# Patient Record
Sex: FEMALE | Race: WHITE | NOT HISPANIC OR LATINO | Employment: UNEMPLOYED | URBAN - METROPOLITAN AREA
[De-identification: names, ages, dates, MRNs, and addresses within clinical notes are randomized per-mention and may not be internally consistent; named-entity substitution may affect disease eponyms.]

---

## 2017-06-23 ENCOUNTER — APPOINTMENT (OUTPATIENT)
Dept: LAB | Facility: HOSPITAL | Age: 32
End: 2017-06-23

## 2017-08-29 ENCOUNTER — TRANSCRIBE ORDERS (OUTPATIENT)
Dept: ENDOCRINOLOGY | Facility: CLINIC | Age: 32
End: 2017-08-29

## 2017-08-29 DIAGNOSIS — O24.419 GESTATIONAL DIABETES MELLITUS IN PREGNANCY, UNSPECIFIED CONTROL: Primary | ICD-10-CM

## 2017-09-08 ENCOUNTER — OFFICE VISIT (OUTPATIENT)
Dept: ENDOCRINOLOGY | Facility: CLINIC | Age: 32
End: 2017-09-08

## 2017-09-08 VITALS
BODY MASS INDEX: 23.73 KG/M2 | HEIGHT: 64 IN | DIASTOLIC BLOOD PRESSURE: 60 MMHG | OXYGEN SATURATION: 97 % | WEIGHT: 139 LBS | SYSTOLIC BLOOD PRESSURE: 112 MMHG | HEART RATE: 118 BPM

## 2017-09-08 DIAGNOSIS — R00.0 FAST HEART BEAT: ICD-10-CM

## 2017-09-08 DIAGNOSIS — O24.419 GESTATIONAL DIABETES MELLITUS (GDM), ANTEPARTUM, GESTATIONAL DIABETES METHOD OF CONTROL UNSPECIFIED: Primary | ICD-10-CM

## 2017-09-08 DIAGNOSIS — K21.9 GASTROESOPHAGEAL REFLUX DISEASE WITHOUT ESOPHAGITIS: ICD-10-CM

## 2017-09-08 PROBLEM — N20.0 CALCULUS OF KIDNEY: Status: ACTIVE | Noted: 2017-09-08

## 2017-09-08 LAB
GLUCOSE BLDC GLUCOMTR-MCNC: 92 MG/DL (ref 70–130)
HBA1C MFR BLD: 5.2 %
TSH SERPL DL<=0.005 MIU/L-ACNC: 1.5 MIU/ML (ref 0.35–5.35)

## 2017-09-08 PROCEDURE — 83036 HEMOGLOBIN GLYCOSYLATED A1C: CPT | Performed by: INTERNAL MEDICINE

## 2017-09-08 PROCEDURE — 99243 OFF/OP CNSLTJ NEW/EST LOW 30: CPT | Performed by: INTERNAL MEDICINE

## 2017-09-08 PROCEDURE — 82947 ASSAY GLUCOSE BLOOD QUANT: CPT | Performed by: INTERNAL MEDICINE

## 2017-09-08 RX ORDER — LANCETS 33 GAUGE
EACH MISCELLANEOUS
Qty: 100 EACH | Refills: 5 | Status: SHIPPED | OUTPATIENT
Start: 2017-09-08 | End: 2019-12-27

## 2017-09-08 RX ORDER — CHLORPHENIR/PHENYLEPH/ASPIRIN 2-7.8-325
1 TABLET, EFFERVESCENT ORAL DAILY
Qty: 50 STRIP | Refills: 2 | Status: SHIPPED | OUTPATIENT
Start: 2017-09-08 | End: 2017-12-05 | Stop reason: HOSPADM

## 2017-09-08 RX ORDER — BLOOD-GLUCOSE METER
EACH MISCELLANEOUS
Qty: 1 EACH | Refills: 0 | Status: SHIPPED | OUTPATIENT
Start: 2017-09-08 | End: 2017-09-15 | Stop reason: CLARIF

## 2017-09-08 RX ORDER — RANITIDINE HCL 75 MG
75 TABLET ORAL 2 TIMES DAILY
COMMUNITY
End: 2020-04-23

## 2017-09-08 NOTE — PROGRESS NOTES
Tasha Mckinney 32 y.o.  CC:Establish Care (new patient being seen at the request of Amber Coles DO for a consultation for gestational diabetes, JULY 12-, patient does have a history of GD 3 years ago , birth weight was 6 lbs, 7 oz)    [place]: Establish Care (new patient being seen at the request of Amber Coles DO for a consultation for gestational diabetes, JULY 12-, patient does have a history of GD 3 years ago , birth weight was 6 lbs, 7 oz)  we say her about 3 years ago for gestational diabetes-  Baby 6 lb 8 oz - was 39 weeks  She is currently 26 weeks  Checked pp pancakes on Saturday- 2 hour sugar 159  Blood sugars and 90 day average sugar reviewed  Results for orders placed or performed in visit on 09/08/17   POC Glycosylated Hemoglobin (Hb A1C)   Result Value Ref Range    Hemoglobin A1C 5.2 %   POC Glucose Fingerstick   Result Value Ref Range    Glucose 92 70 - 130 mg/dL       Controlled sugars with diet last pregnancy  Also very active   More morning sickness this pregnancy   We discussed the diagnosis of gestational diabetes and reviewed her glucose levels/ glucose tolerance test.  We discussed the concept of carbohydrate awaredness and carbohydrate content of meals was discussed.  Impact of sweets and other carb containing foods and types of foods typically high in carbohydrates was discussed. Overall guidelines for meal planning related to specific carbohydrate content of meals was discussed and she was provided recommendations about carbohydrates recommended with meals and with snacks.  She was asked to give up any sugared drinks, and avoid breakfast cereal.  Blood sugar testing fasting and after each meal was reviewed and the patient was taught to use a glucometer to test her blood sugar.  Normal values for blood sugar monitoring were discussed as well, with fasting level less than 95, 1 hour pp blood sugar less than 140 and 2 hour blood sugar less than 120 recommended.  Risks related to poor  control of blood sugars during pregnancy were discussed with a focus on specific risks to both her and the baby.  Risks discussed include macrosomia (large birthweight), fetal lung immaturity with respiratory distress and low oxygen level, stillbirth, low blood sugar after delivery, high bilirubin/jaundice, and hypocalcemia.  Use of medications during pregnancy (eg metformin, glyburide and insulin) was discussed.  Her long term risks (outside of pregnancy) for development of Diabetes Mellitus were reviewed and we discussed the importance of healthy lifestyle now and in the future to help reduce the risk of progression to diabetes.  She will begin testing sugars fasting and 2 hours after meals and will fax blood sugars weekly to [email or web address].  We will plan to see her back in 3-4 weeks, or sooner if problems or questions.  Thank you for this referral and please do not hesitate to call for any problems or questions.      No Known Allergies    Current Outpatient Prescriptions:   •  raNITIdine (ZANTAC) 75 MG tablet, Take 75 mg by mouth 2 (Two) Times a Day., Disp: , Rfl:   •  Acetone, Urine, Test (KETONE TEST) strip, 1 strip by In Vitro route Daily. Test urine ketones daily in the AM, Disp: 50 strip, Rfl: 2  •  Blood Glucose Monitoring Suppl (ONE TOUCH ULTRA 2) w/Device kit, Use daily to test blood sugars, Disp: 1 each, Rfl: 0  •  glucose blood (ONE TOUCH ULTRA TEST) test strip, Test blood sugars QID, Disp: 100 each, Rfl: 5  •  ONETOUCH DELICA LANCETS 33G misc, Test blood sugars QID, Disp: 100 each, Rfl: 5  Patient Active Problem List    Diagnosis   • Acid reflux [K21.9]   • Diabetes mellitus arising in pregnancy [O24.419]     Overview Note:     Impression: 09/05/2014 - doing well - blood sugars are normal with exception of 2 higher sugars pp lunch and dinner  baby moving well  she is starting to dilate - discussed postpartum   f/u 8 weeks.    commended efforts at diet/testing  Impression: 08/12/2014 -  reviewed blood sugars with her- doing well thus far.  reinforced benefits of tight sugar control during pregnancy and discussed options for higher protein intake in diet.  she will update blood sugars weekly and we will f/u 3-4 weeks;        Assessment & Plan Note:     Blood sugar and 90 day average sugar reviewed  Results for orders placed or performed in visit on 09/08/17   POC Glycosylated Hemoglobin (Hb A1C)   Result Value Ref Range    Hemoglobin A1C 5.2 %   POC Glucose Fingerstick   Result Value Ref Range    Glucose 92 70 - 130 mg/dL     Discussed starting to test sugar fasting and 2 hour pp   email sugars on Monday  Goals fasting 95 or less and pp less than 120 at 2 hours  Rationale for goals and risk of higher sugars reviewed  She will email for any problems or questions  F/u 4 weeks       • Calculus of kidney [N20.0]   • Fast heart beat [R00.0]     Review of Systems   Constitutional: Negative for activity change, appetite change, chills, diaphoresis, fatigue, fever and unexpected weight change.   HENT: Negative for congestion, dental problem, drooling, ear discharge, ear pain, facial swelling, hearing loss, mouth sores, nosebleeds, postnasal drip, rhinorrhea, sinus pressure, sneezing, sore throat, tinnitus, trouble swallowing and voice change.    Eyes: Positive for visual disturbance. Negative for photophobia, pain, discharge, redness and itching.   Respiratory: Negative for apnea, cough, choking, chest tightness, shortness of breath, wheezing and stridor.    Cardiovascular: Positive for palpitations. Negative for chest pain and leg swelling.        Leg cramps    Gastrointestinal: Positive for nausea. Negative for abdominal distention, abdominal pain, anal bleeding, blood in stool, constipation, diarrhea, rectal pain and vomiting.        GERD   Endocrine: Negative for cold intolerance, heat intolerance, polydipsia, polyphagia and polyuria.   Genitourinary: Negative for decreased urine volume, difficulty  "urinating, dysuria, enuresis, flank pain, frequency, genital sores, hematuria and urgency.   Musculoskeletal: Negative for arthralgias, back pain, gait problem, joint swelling, myalgias, neck pain and neck stiffness.   Skin: Negative for color change, pallor, rash and wound.        Bruising    Allergic/Immunologic: Negative for environmental allergies, food allergies and immunocompromised state.   Neurological: Negative for dizziness, tremors, seizures, syncope, facial asymmetry, speech difficulty, weakness, light-headedness, numbness and headaches.   Hematological: Negative for adenopathy. Does not bruise/bleed easily.   Psychiatric/Behavioral: Negative for agitation, behavioral problems, confusion, decreased concentration, dysphoric mood, hallucinations, self-injury, sleep disturbance and suicidal ideas. The patient is not nervous/anxious and is not hyperactive.      Social History     Social History   • Marital status: Unknown     Spouse name: N/A   • Number of children: N/A   • Years of education: N/A     Occupational History   • Not on file.     Social History Main Topics   • Smoking status: Never Smoker   • Smokeless tobacco: Never Used   • Alcohol use No   • Drug use: No   • Sexual activity: Defer     Other Topics Concern   • Not on file     Social History Narrative   • No narrative on file     Family History   Problem Relation Age of Onset   • Diabetes Mother    • Hypertension Mother    • Heart attack Father    • Diabetes Father    • Coronary artery disease Father    • Hyperlipidemia Father      /60  Pulse 118  Ht 64\" (162.6 cm)  Wt 139 lb (63 kg)  LMP 02/25/2017  SpO2 97%  BMI 23.86 kg/m2  Physical Exam   Constitutional: She is oriented to person, place, and time. She appears well-developed and well-nourished.   HENT:   Head: Normocephalic and atraumatic.   Nose: Nose normal.   Mouth/Throat: Oropharynx is clear and moist.   Eyes: Conjunctivae, EOM and lids are normal. Pupils are equal, round, " and reactive to light.   Neck: Trachea normal and normal range of motion. Neck supple. Carotid bruit is not present. No tracheal deviation present. No thyroid mass and no thyromegaly present.   Cardiovascular: Normal rate, regular rhythm, normal heart sounds and intact distal pulses.  Exam reveals no gallop and no friction rub.    No murmur heard.  Pulmonary/Chest: Effort normal and breath sounds normal. No respiratory distress. She has no wheezes.   Musculoskeletal: Normal range of motion. She exhibits no edema or deformity.   Lymphadenopathy:     She has no cervical adenopathy.   Neurological: She is alert and oriented to person, place, and time. She has normal reflexes. She displays normal reflexes. No cranial nerve deficit.   Skin: Skin is warm and dry. No rash noted. No cyanosis or erythema. Nails show no clubbing.   Psychiatric: She has a normal mood and affect. Her speech is normal and behavior is normal. Judgment and thought content normal. Cognition and memory are normal.   Nursing note and vitals reviewed.    Results for orders placed or performed in visit on 09/08/17   POC Glycosylated Hemoglobin (Hb A1C)   Result Value Ref Range    Hemoglobin A1C 5.2 %   POC Glucose Fingerstick   Result Value Ref Range    Glucose 92 70 - 130 mg/dL     Problem List Items Addressed This Visit        Endocrine    Diabetes mellitus arising in pregnancy - Primary     Blood sugar and 90 day average sugar reviewed  Results for orders placed or performed in visit on 09/08/17   POC Glycosylated Hemoglobin (Hb A1C)   Result Value Ref Range    Hemoglobin A1C 5.2 %   POC Glucose Fingerstick   Result Value Ref Range    Glucose 92 70 - 130 mg/dL     Discussed starting to test sugar fasting and 2 hour pp   email sugars on Monday  Goals fasting 95 or less and pp less than 120 at 2 hours  Rationale for goals and risk of higher sugars reviewed  She will email for any problems or questions  F/u 4 weeks           Relevant Orders    POC  Glycosylated Hemoglobin (Hb A1C) (Completed)    POC Glucose Fingerstick (Completed)    TSH        Return in about 4 weeks (around 10/6/2017) for Recheck 30 min .    Gia Hidalgo MD  Signed Gia Hidalgo MD

## 2017-09-08 NOTE — ASSESSMENT & PLAN NOTE
Blood sugar and 90 day average sugar reviewed  Results for orders placed or performed in visit on 09/08/17   POC Glycosylated Hemoglobin (Hb A1C)   Result Value Ref Range    Hemoglobin A1C 5.2 %   POC Glucose Fingerstick   Result Value Ref Range    Glucose 92 70 - 130 mg/dL     Discussed starting to test sugar fasting and 2 hour pp   email sugars on Monday  Goals fasting 95 or less and pp less than 120 at 2 hours  Rationale for goals and risk of higher sugars reviewed  She will email for any problems or questions  F/u 4 weeks

## 2017-09-15 RX ORDER — GLUCOSAMINE HCL/CHONDROITIN SU 500-400 MG
CAPSULE ORAL
Qty: 100 EACH | Refills: 5 | Status: SHIPPED | OUTPATIENT
Start: 2017-09-15 | End: 2017-12-05 | Stop reason: HOSPADM

## 2017-09-15 RX ORDER — PEN NEEDLE, DIABETIC 31 GX5/16"
NEEDLE, DISPOSABLE MISCELLANEOUS
Qty: 100 EACH | Refills: 5 | Status: SHIPPED | OUTPATIENT
Start: 2017-09-15 | End: 2019-12-27

## 2017-09-15 RX ORDER — DIPHENHYDRAMINE HYDROCHLORIDE 25 MG/1
1 CAPSULE, LIQUID FILLED ORAL DAILY
Qty: 1 EACH | Refills: 0 | Status: SHIPPED | OUTPATIENT
Start: 2017-09-15 | End: 2019-12-27

## 2017-09-15 NOTE — TELEPHONE ENCOUNTER
rx was sent for glucose meter, strips and lancets to replace one touch meter (due to insurance coverage)

## 2017-10-02 ENCOUNTER — OFFICE VISIT (OUTPATIENT)
Dept: ENDOCRINOLOGY | Facility: CLINIC | Age: 32
End: 2017-10-02

## 2017-10-02 VITALS
SYSTOLIC BLOOD PRESSURE: 112 MMHG | HEIGHT: 65 IN | DIASTOLIC BLOOD PRESSURE: 50 MMHG | OXYGEN SATURATION: 98 % | WEIGHT: 143 LBS | BODY MASS INDEX: 23.82 KG/M2 | HEART RATE: 93 BPM

## 2017-10-02 DIAGNOSIS — O24.410 DIET CONTROLLED GESTATIONAL DIABETES MELLITUS (GDM) IN SECOND TRIMESTER: Primary | ICD-10-CM

## 2017-10-02 PROCEDURE — 99213 OFFICE O/P EST LOW 20 MIN: CPT | Performed by: INTERNAL MEDICINE

## 2017-10-02 RX ORDER — LANCETS 33 GAUGE
EACH MISCELLANEOUS
COMMUNITY
Start: 2014-08-12 | End: 2019-12-27

## 2017-10-02 RX ORDER — .BETA.-CAROTENE, ASCORBIC ACID, CHOLECALCIFEROL, .ALPHA.-TOCOPHEROL ACETATE, DL-, THIAMINE MONONITRATE, RIBOFLAVIN, NIACINAMIDE, PYRIDOXINE HYDROCHLORIDE, FOLIC ACID, CYANOCOBALAMIN, CALCIUM PANTOTHENATE, CALCIUM CARBONATE, FERROUS FUMARATE, ZINC OXIDE, AND DOCUSATE SODIUM 1000; 100; 400; 30; 3; 3; 15; 20; 1; 12; 7; 200; 29; 20; 25 [IU]/1; MG/1; [IU]/1; [IU]/1; MG/1; MG/1; MG/1; MG/1; MG/1; UG/1; MG/1; MG/1; MG/1; MG/1; MG/1
TABLET, COATED ORAL DAILY
COMMUNITY
Start: 2014-08-04

## 2017-10-02 NOTE — ASSESSMENT & PLAN NOTE
Higher blood sugars (see above)   She can modify diet - has been eating more carbs than allotted   Continue eating a healthy diet, monitor sugars and email this Sunday  Most higher sugars are marginal, I think it is fine for her to work a little harder on diet  F/u 3-4 weeks

## 2017-10-02 NOTE — PROGRESS NOTES
Tasha Mckinney 32 y.o.  CC:Follow-up and Gestational Diabetes (JULY 12-17-17, OB:  Amber Coles DO)    [place]: Follow-up and Gestational Diabetes (JULY 12-17-17, OB:  Amber Coles DO)    Blood sugars fasting 84-94  Pp breakfast 104-114  Pp lunch 108-136 with 3 sugars over goal  Pp dinner 102-128 with 2 sugars over goal  Higher sugars are after visiting friend and eating cake  Discussed acog criteria for medication when sugar levels are over goal more than 2 x a week  She does not want to start medication - states she can do better with diet- is eating a lot of sweets  Reviewed goal for no more than 2 sugars over goals per week.  Baby moving well    No Known Allergies    Current Outpatient Prescriptions:   •  Acetone, Urine, Test (KETONE TEST) strip, 1 strip by In Vitro route Daily. Test urine ketones daily in the AM, Disp: 50 strip, Rfl: 2  •  Blood Glucose Monitoring Suppl (BLOOD GLUCOSE MONITOR SYSTEM) w/Device kit, 1 Device Daily., Disp: 1 each, Rfl: 0  •  Glucose Blood (BLOOD GLUCOSE TEST) strip, Test blood sugar QID, Disp: 100 each, Rfl: 5  •  LANCETS ULTRA THIN misc, Test blood sugars QID, Disp: 100 each, Rfl: 5  •  ONETOUCH DELICA LANCETS 33G misc, Test blood sugars QID, Disp: 100 each, Rfl: 5  •  raNITIdine (ZANTAC) 75 MG tablet, Take 75 mg by mouth 2 (Two) Times a Day., Disp: , Rfl:   Patient Active Problem List    Diagnosis   • Acid reflux [K21.9]   • Diabetes mellitus arising in pregnancy [O24.419]     Overview Note:     Impression: 09/05/2014 - doing well - blood sugars are normal with exception of 2 higher sugars pp lunch and dinner  baby moving well  she is starting to dilate - discussed postpartum   f/u 8 weeks.    commended efforts at diet/testing  Impression: 08/12/2014 - reviewed blood sugars with her- doing well thus far.  reinforced benefits of tight sugar control during pregnancy and discussed options for higher protein intake in diet.  she will update blood sugars weekly and we will f/u 3-4 weeks;       • Calculus of kidney [N20.0]   • Fast heart beat [R00.0]     Review of Systems   Constitutional: Negative for activity change, appetite change, chills, diaphoresis, fatigue, fever and unexpected weight change.   HENT: Negative for congestion, dental problem, drooling, ear discharge, ear pain, facial swelling, hearing loss, mouth sores, nosebleeds, postnasal drip, rhinorrhea, sinus pressure, sneezing, sore throat, tinnitus, trouble swallowing and voice change.    Eyes: Negative for photophobia, pain, discharge, redness, itching and visual disturbance.   Respiratory: Negative for apnea, cough, choking, chest tightness, shortness of breath, wheezing and stridor.    Cardiovascular: Negative for chest pain, palpitations and leg swelling.   Gastrointestinal: Negative for abdominal distention, abdominal pain, anal bleeding, blood in stool, constipation, diarrhea, nausea, rectal pain and vomiting.   Endocrine: Negative for cold intolerance, heat intolerance, polydipsia, polyphagia and polyuria.   Genitourinary: Negative for decreased urine volume, difficulty urinating, dysuria, enuresis, flank pain, frequency, genital sores, hematuria and urgency.   Musculoskeletal: Negative for arthralgias, back pain, gait problem, joint swelling, myalgias, neck pain and neck stiffness.   Skin: Negative for color change, pallor, rash and wound.   Allergic/Immunologic: Negative for environmental allergies, food allergies and immunocompromised state.   Neurological: Negative for dizziness, tremors, seizures, syncope, facial asymmetry, speech difficulty, weakness, light-headedness, numbness and headaches.   Hematological: Negative for adenopathy. Does not bruise/bleed easily.   Psychiatric/Behavioral: Negative for agitation, behavioral problems, confusion, decreased concentration, dysphoric mood, hallucinations, self-injury, sleep disturbance and suicidal ideas. The patient is not nervous/anxious and is not hyperactive.      Social  "History     Social History   • Marital status: Unknown     Spouse name: N/A   • Number of children: N/A   • Years of education: N/A     Occupational History   • Not on file.     Social History Main Topics   • Smoking status: Never Smoker   • Smokeless tobacco: Never Used   • Alcohol use No   • Drug use: No   • Sexual activity: Defer     Other Topics Concern   • Not on file     Social History Narrative     Family History   Problem Relation Age of Onset   • Diabetes Mother    • Hypertension Mother    • Heart attack Father    • Diabetes Father    • Coronary artery disease Father    • Hyperlipidemia Father      /50  Pulse 93  Ht 65\" (165.1 cm)  Wt 143 lb (64.9 kg)  LMP 02/25/2017  SpO2 98%  BMI 23.8 kg/m2  Physical Exam   Constitutional: She is oriented to person, place, and time. She appears well-developed and well-nourished.   HENT:   Head: Normocephalic and atraumatic.   Nose: Nose normal.   Mouth/Throat: Oropharynx is clear and moist.   Eyes: Conjunctivae, EOM and lids are normal. Pupils are equal, round, and reactive to light.   Neck: Trachea normal and normal range of motion. Neck supple. Carotid bruit is not present. No tracheal deviation present. No thyroid mass and no thyromegaly present.   Cardiovascular: Normal rate, regular rhythm, normal heart sounds and intact distal pulses.  Exam reveals no gallop and no friction rub.    No murmur heard.  Pulmonary/Chest: Effort normal and breath sounds normal. No respiratory distress. She has no wheezes.   Musculoskeletal: Normal range of motion. She exhibits no edema or deformity.   Lymphadenopathy:     She has no cervical adenopathy.   Neurological: She is alert and oriented to person, place, and time. She has normal reflexes. She displays normal reflexes. No cranial nerve deficit.   Skin: Skin is warm and dry. No rash noted. No cyanosis or erythema. Nails show no clubbing.   Psychiatric: She has a normal mood and affect. Her speech is normal and behavior " is normal. Judgment and thought content normal. Cognition and memory are normal.   Nursing note and vitals reviewed.    Results for orders placed or performed in visit on 09/08/17   TSH   Result Value Ref Range    TSH 1.497 0.350 - 5.350 mIU/mL   POC Glycosylated Hemoglobin (Hb A1C)   Result Value Ref Range    Hemoglobin A1C 5.2 %   POC Glucose Fingerstick   Result Value Ref Range    Glucose 92 70 - 130 mg/dL     Problem List Items Addressed This Visit        Endocrine    Diabetes mellitus arising in pregnancy - Primary     Higher blood sugars (see above)   She can modify diet - has been eating more carbs than allotted   Continue eating a healthy diet, monitor sugars and email this Sunday  Most higher sugars are marginal, I think it is fine for her to work a little harder on diet  F/u 3-4 weeks              Return in about 4 weeks (around 10/30/2017) for Recheck 30 min .      Selene Howard MA  Signed Gia Hidalgo MD

## 2017-11-02 ENCOUNTER — OFFICE VISIT (OUTPATIENT)
Dept: ENDOCRINOLOGY | Facility: CLINIC | Age: 32
End: 2017-11-02

## 2017-11-02 VITALS
SYSTOLIC BLOOD PRESSURE: 122 MMHG | OXYGEN SATURATION: 100 % | HEIGHT: 64 IN | DIASTOLIC BLOOD PRESSURE: 70 MMHG | HEART RATE: 119 BPM | WEIGHT: 147 LBS | BODY MASS INDEX: 25.1 KG/M2

## 2017-11-02 DIAGNOSIS — O24.410 DIET CONTROLLED GESTATIONAL DIABETES MELLITUS (GDM) IN THIRD TRIMESTER: Primary | ICD-10-CM

## 2017-11-02 PROCEDURE — 99213 OFFICE O/P EST LOW 20 MIN: CPT | Performed by: INTERNAL MEDICINE

## 2017-11-02 NOTE — PROGRESS NOTES
Tasha Hank 32 y.o.  CC:Follow-up and Gestational Diabetes (JULY 12- OB: Amber Coles DO)    [place]: Follow-up and Gestational Diabetes (JULY 12- OB: Amber Coles DO)  fasting sugar is 83-92  Pp breakfast   Pp lunch  with 1 sugar over goal  Pp dinner  with no sugar over goal (129 was 1 hour testing)  Blood sugars are still normal this week  Sugars are lower  Is doing well with snack at bedtime   She is 33 weeks  Baby moving well   No swelling   bp is good  A lot of nausea in the morning     Allergies   Allergen Reactions   • No Known Drug Allergy        Current Outpatient Prescriptions:   •  Acetone, Urine, Test (KETONE TEST) strip, 1 strip by In Vitro route Daily. Test urine ketones daily in the AM, Disp: 50 strip, Rfl: 2  •  acetone, urine, test (KETOSTIX) strip, Ketostix In Vitro Strip; Patient Sig: Ketostix In Vitro Strip Check urine q am; 1; 2; 04-Aug-2014; Active, Disp: , Rfl:   •  Blood Glucose Monitoring Suppl (BLOOD GLUCOSE MONITOR SYSTEM) w/Device kit, 1 Device Daily., Disp: 1 each, Rfl: 0  •  Glucose Blood (BLOOD GLUCOSE TEST) strip, Test blood sugar QID, Disp: 100 each, Rfl: 5  •  LANCETS ULTRA THIN misc, Test blood sugars QID, Disp: 100 each, Rfl: 5  •  ONETOUCH DELICA LANCETS 33G misc, Test blood sugars QID, Disp: 100 each, Rfl: 5  •  ONETOUCH DELICA LANCETS 33G misc, , Disp: , Rfl:   •  Prenatal Vit-DSS-Fe Fum-FA (PRENATAL 19) 29-1 MG tablet, Take  by mouth Daily., Disp: , Rfl:   •  raNITIdine (ZANTAC) 75 MG tablet, Take 75 mg by mouth 2 (Two) Times a Day., Disp: , Rfl:   Patient Active Problem List    Diagnosis   • Acid reflux [K21.9]   • Diabetes mellitus arising in pregnancy [O24.419]     Overview Note:     Impression: 09/05/2014 - doing well - blood sugars are normal with exception of 2 higher sugars pp lunch and dinner  baby moving well  she is starting to dilate - discussed postpartum   f/u 8 weeks.    commended efforts at diet/testing  Impression: 08/12/2014 -  reviewed blood sugars with her- doing well thus far.  reinforced benefits of tight sugar control during pregnancy and discussed options for higher protein intake in diet.  she will update blood sugars weekly and we will f/u 3-4 weeks;      • Calculus of kidney [N20.0]   • Fast heart beat [R00.0]     Review of Systems   Constitutional: Negative for activity change, appetite change, chills, diaphoresis, fatigue, fever and unexpected weight change.   HENT: Negative for congestion, dental problem, drooling, ear discharge, ear pain, facial swelling, hearing loss, mouth sores, nosebleeds, postnasal drip, rhinorrhea, sinus pressure, sneezing, sore throat, tinnitus, trouble swallowing and voice change.    Eyes: Negative for photophobia, pain, discharge, redness, itching and visual disturbance.   Respiratory: Negative for apnea, cough, choking, chest tightness, shortness of breath, wheezing and stridor.    Cardiovascular: Negative for chest pain, palpitations and leg swelling.   Gastrointestinal: Negative for abdominal distention, abdominal pain, anal bleeding, blood in stool, constipation, diarrhea, nausea, rectal pain and vomiting.   Endocrine: Negative for cold intolerance, heat intolerance, polydipsia, polyphagia and polyuria.   Genitourinary: Negative for decreased urine volume, difficulty urinating, dysuria, enuresis, flank pain, frequency, genital sores, hematuria and urgency.   Musculoskeletal: Negative for arthralgias, back pain, gait problem, joint swelling, myalgias, neck pain and neck stiffness.   Skin: Negative for color change, pallor, rash and wound.   Allergic/Immunologic: Negative for environmental allergies, food allergies and immunocompromised state.   Neurological: Negative for dizziness, tremors, seizures, syncope, facial asymmetry, speech difficulty, weakness, light-headedness, numbness and headaches.   Hematological: Negative for adenopathy. Does not bruise/bleed easily.   Psychiatric/Behavioral:  "Negative for agitation, behavioral problems, confusion, decreased concentration, dysphoric mood, hallucinations, self-injury, sleep disturbance and suicidal ideas. The patient is not nervous/anxious and is not hyperactive.      Social History     Social History   • Marital status: Unknown     Spouse name: N/A   • Number of children: N/A   • Years of education: N/A     Occupational History   • Not on file.     Social History Main Topics   • Smoking status: Never Smoker   • Smokeless tobacco: Never Used   • Alcohol use No   • Drug use: No   • Sexual activity: Defer     Other Topics Concern   • Not on file     Social History Narrative     Family History   Problem Relation Age of Onset   • Diabetes Mother    • Hypertension Mother    • Heart attack Father    • Diabetes Father    • Coronary artery disease Father    • Hyperlipidemia Father      /70  Pulse 119  Ht 64\" (162.6 cm)  Wt 147 lb (66.7 kg)  LMP 02/25/2017  SpO2 100%  BMI 25.23 kg/m2  Physical Exam   Constitutional: She is oriented to person, place, and time. She appears well-developed and well-nourished.   HENT:   Head: Normocephalic and atraumatic.   Nose: Nose normal.   Mouth/Throat: Oropharynx is clear and moist.   Eyes: Conjunctivae, EOM and lids are normal. Pupils are equal, round, and reactive to light.   Neck: Trachea normal and normal range of motion. Neck supple. Carotid bruit is not present. No tracheal deviation present. No thyroid mass and no thyromegaly present.   Cardiovascular: Normal rate, regular rhythm, normal heart sounds and intact distal pulses.  Exam reveals no gallop and no friction rub.    No murmur heard.  Pulmonary/Chest: Effort normal and breath sounds normal. No respiratory distress. She has no wheezes.   Musculoskeletal: Normal range of motion. She exhibits no edema or deformity.   Lymphadenopathy:     She has no cervical adenopathy.   Neurological: She is alert and oriented to person, place, and time. She has normal " reflexes. She displays normal reflexes. No cranial nerve deficit.   Skin: Skin is warm and dry. No rash noted. No cyanosis or erythema. Nails show no clubbing.   Psychiatric: She has a normal mood and affect. Her speech is normal and behavior is normal. Judgment and thought content normal. Cognition and memory are normal.   Nursing note and vitals reviewed.    Results for orders placed or performed in visit on 09/08/17   TSH   Result Value Ref Range    TSH 1.497 0.350 - 5.350 mIU/mL   POC Glycosylated Hemoglobin (Hb A1C)   Result Value Ref Range    Hemoglobin A1C 5.2 %   POC Glucose Fingerstick   Result Value Ref Range    Glucose 92 70 - 130 mg/dL     Problem List Items Addressed This Visit        Endocrine    Diabetes mellitus arising in pregnancy - Primary     Reviewed sugars last week and this week and they are normal  No change for now - f/u pp in 12-14 weeks            Return in about 14 weeks (around 2/8/2018) for Recheck 30 min .    Selene Howard MA  Signed Gia Hidalgo MD

## 2017-11-02 NOTE — ASSESSMENT & PLAN NOTE
Reviewed sugars last week and this week and they are normal  No change for now - f/u pp in 12-14 weeks

## 2017-11-28 ENCOUNTER — LAB REQUISITION (OUTPATIENT)
Dept: LAB | Facility: HOSPITAL | Age: 32
End: 2017-11-28

## 2017-11-28 DIAGNOSIS — Z00.00 ROUTINE GENERAL MEDICAL EXAMINATION AT A HEALTH CARE FACILITY: ICD-10-CM

## 2017-11-28 PROCEDURE — 36415 COLL VENOUS BLD VENIPUNCTURE: CPT | Performed by: OBSTETRICS & GYNECOLOGY

## 2017-12-03 ENCOUNTER — HOSPITAL ENCOUNTER (INPATIENT)
Facility: HOSPITAL | Age: 32
LOS: 2 days | Discharge: HOME | End: 2017-12-05
Attending: OBSTETRICS & GYNECOLOGY | Admitting: OBSTETRICS & GYNECOLOGY

## 2017-12-03 PROBLEM — Z37.9 NORMAL LABOR: Status: RESOLVED | Noted: 2017-12-03 | Resolved: 2017-12-03

## 2017-12-03 PROBLEM — Z3A.38 PREGNANCY WITH 38 COMPLETED WEEKS GESTATION: Status: RESOLVED | Noted: 2017-12-03 | Resolved: 2017-12-03

## 2017-12-03 PROBLEM — Z3A.38 PREGNANCY WITH 38 COMPLETED WEEKS GESTATION: Status: ACTIVE | Noted: 2017-12-03

## 2017-12-03 PROBLEM — Z37.9 NORMAL LABOR: Status: ACTIVE | Noted: 2017-12-03

## 2017-12-03 LAB
ABO GROUP BLD: NORMAL
ALP SERPL-CCNC: 173 U/L (ref 25–100)
ALT SERPL W P-5'-P-CCNC: 18 U/L (ref 7–40)
AST SERPL-CCNC: 22 U/L (ref 0–33)
BILIRUB SERPL-MCNC: 0.4 MG/DL (ref 0.3–1.2)
BLD GP AB SCN SERPL QL: NEGATIVE
CREAT BLD-MCNC: 0.5 MG/DL (ref 0.6–1.3)
DEPRECATED RDW RBC AUTO: 47.5 FL (ref 37–54)
ERYTHROCYTE [DISTWIDTH] IN BLOOD BY AUTOMATED COUNT: 16.8 % (ref 11.3–14.5)
GLUCOSE BLDC GLUCOMTR-MCNC: 100 MG/DL (ref 70–130)
HCT VFR BLD AUTO: 30.2 % (ref 34.5–44)
HGB BLD-MCNC: 9.2 G/DL (ref 11.5–15.5)
LDH SERPL-CCNC: 198 U/L (ref 120–246)
MCH RBC QN AUTO: 23.9 PG (ref 27–31)
MCHC RBC AUTO-ENTMCNC: 30.5 G/DL (ref 32–36)
MCV RBC AUTO: 78.4 FL (ref 80–99)
PLATELET # BLD AUTO: 161 10*3/MM3 (ref 150–450)
PMV BLD AUTO: 9.1 FL (ref 6–12)
RBC # BLD AUTO: 3.85 10*6/MM3 (ref 3.89–5.14)
RH BLD: POSITIVE
URATE SERPL-MCNC: 5.6 MG/DL (ref 3.1–7.8)
WBC NRBC COR # BLD: 11.11 10*3/MM3 (ref 3.5–10.8)

## 2017-12-03 PROCEDURE — 86900 BLOOD TYPING SEROLOGIC ABO: CPT | Performed by: ADVANCED PRACTICE MIDWIFE

## 2017-12-03 PROCEDURE — 86850 RBC ANTIBODY SCREEN: CPT | Performed by: ADVANCED PRACTICE MIDWIFE

## 2017-12-03 PROCEDURE — 84550 ASSAY OF BLOOD/URIC ACID: CPT | Performed by: ADVANCED PRACTICE MIDWIFE

## 2017-12-03 PROCEDURE — 83615 LACTATE (LD) (LDH) ENZYME: CPT | Performed by: ADVANCED PRACTICE MIDWIFE

## 2017-12-03 PROCEDURE — 85027 COMPLETE CBC AUTOMATED: CPT | Performed by: ADVANCED PRACTICE MIDWIFE

## 2017-12-03 PROCEDURE — 86901 BLOOD TYPING SEROLOGIC RH(D): CPT | Performed by: ADVANCED PRACTICE MIDWIFE

## 2017-12-03 PROCEDURE — 84450 TRANSFERASE (AST) (SGOT): CPT | Performed by: ADVANCED PRACTICE MIDWIFE

## 2017-12-03 PROCEDURE — 25010000002 BUTORPHANOL PER 1 MG: Performed by: ADVANCED PRACTICE MIDWIFE

## 2017-12-03 PROCEDURE — 84075 ASSAY ALKALINE PHOSPHATASE: CPT | Performed by: ADVANCED PRACTICE MIDWIFE

## 2017-12-03 PROCEDURE — 84460 ALANINE AMINO (ALT) (SGPT): CPT | Performed by: ADVANCED PRACTICE MIDWIFE

## 2017-12-03 PROCEDURE — 25010000002 PENICILLIN G POTASSIUM PER 600000 UNITS: Performed by: ADVANCED PRACTICE MIDWIFE

## 2017-12-03 PROCEDURE — 82962 GLUCOSE BLOOD TEST: CPT

## 2017-12-03 PROCEDURE — 82247 BILIRUBIN TOTAL: CPT | Performed by: ADVANCED PRACTICE MIDWIFE

## 2017-12-03 PROCEDURE — 59025 FETAL NON-STRESS TEST: CPT

## 2017-12-03 PROCEDURE — 82565 ASSAY OF CREATININE: CPT | Performed by: ADVANCED PRACTICE MIDWIFE

## 2017-12-03 PROCEDURE — 0KQM0ZZ REPAIR PERINEUM MUSCLE, OPEN APPROACH: ICD-10-PCS | Performed by: ADVANCED PRACTICE MIDWIFE

## 2017-12-03 RX ORDER — OXYTOCIN/RINGER'S LACTATE 20/1000 ML
125 PLASTIC BAG, INJECTION (ML) INTRAVENOUS CONTINUOUS PRN
Status: DISPENSED | OUTPATIENT
Start: 2017-12-03 | End: 2017-12-04

## 2017-12-03 RX ORDER — MISOPROSTOL 200 UG/1
800 TABLET ORAL AS NEEDED
Status: DISCONTINUED | OUTPATIENT
Start: 2017-12-03 | End: 2017-12-05 | Stop reason: HOSPADM

## 2017-12-03 RX ORDER — LIDOCAINE HYDROCHLORIDE 10 MG/ML
5 INJECTION, SOLUTION INFILTRATION; PERINEURAL AS NEEDED
Status: DISCONTINUED | OUTPATIENT
Start: 2017-12-03 | End: 2017-12-03 | Stop reason: HOSPADM

## 2017-12-03 RX ORDER — DOCUSATE SODIUM 100 MG/1
100 CAPSULE, LIQUID FILLED ORAL 2 TIMES DAILY
Status: DISCONTINUED | OUTPATIENT
Start: 2017-12-04 | End: 2017-12-05 | Stop reason: HOSPADM

## 2017-12-03 RX ORDER — METHYLERGONOVINE MALEATE 0.2 MG/ML
200 INJECTION INTRAVENOUS ONCE AS NEEDED
Status: DISCONTINUED | OUTPATIENT
Start: 2017-12-03 | End: 2017-12-05 | Stop reason: HOSPADM

## 2017-12-03 RX ORDER — IBUPROFEN 600 MG/1
600 TABLET ORAL EVERY 6 HOURS PRN
Status: DISCONTINUED | OUTPATIENT
Start: 2017-12-03 | End: 2017-12-05 | Stop reason: HOSPADM

## 2017-12-03 RX ORDER — SODIUM CHLORIDE 0.9 % (FLUSH) 0.9 %
1-10 SYRINGE (ML) INJECTION AS NEEDED
Status: DISCONTINUED | OUTPATIENT
Start: 2017-12-03 | End: 2017-12-03 | Stop reason: HOSPADM

## 2017-12-03 RX ORDER — OXYTOCIN/RINGER'S LACTATE 20/1000 ML
999 PLASTIC BAG, INJECTION (ML) INTRAVENOUS ONCE
Status: DISCONTINUED | OUTPATIENT
Start: 2017-12-03 | End: 2017-12-05 | Stop reason: HOSPADM

## 2017-12-03 RX ORDER — MAGNESIUM CARB/ALUMINUM HYDROX 105-160MG
30 TABLET,CHEWABLE ORAL ONCE
Status: DISCONTINUED | OUTPATIENT
Start: 2017-12-03 | End: 2017-12-03 | Stop reason: HOSPADM

## 2017-12-03 RX ORDER — PRENATAL VIT/IRON FUM/FOLIC AC 27MG-0.8MG
1 TABLET ORAL DAILY
Status: DISCONTINUED | OUTPATIENT
Start: 2017-12-04 | End: 2017-12-05 | Stop reason: HOSPADM

## 2017-12-03 RX ORDER — ACETAMINOPHEN 325 MG/1
650 TABLET ORAL EVERY 4 HOURS PRN
Status: DISCONTINUED | OUTPATIENT
Start: 2017-12-03 | End: 2017-12-05 | Stop reason: HOSPADM

## 2017-12-03 RX ORDER — FERROUS SULFATE 325(65) MG
325 TABLET ORAL 2 TIMES DAILY WITH MEALS
Status: DISCONTINUED | OUTPATIENT
Start: 2017-12-03 | End: 2017-12-05 | Stop reason: HOSPADM

## 2017-12-03 RX ORDER — SODIUM CHLORIDE, SODIUM LACTATE, POTASSIUM CHLORIDE, CALCIUM CHLORIDE 600; 310; 30; 20 MG/100ML; MG/100ML; MG/100ML; MG/100ML
125 INJECTION, SOLUTION INTRAVENOUS CONTINUOUS
Status: DISCONTINUED | OUTPATIENT
Start: 2017-12-03 | End: 2017-12-03

## 2017-12-03 RX ORDER — CARBOPROST TROMETHAMINE 250 UG/ML
250 INJECTION, SOLUTION INTRAMUSCULAR AS NEEDED
Status: DISCONTINUED | OUTPATIENT
Start: 2017-12-03 | End: 2017-12-05 | Stop reason: HOSPADM

## 2017-12-03 RX ADMIN — ACETAMINOPHEN 650 MG: 325 TABLET, FILM COATED ORAL at 22:38

## 2017-12-03 RX ADMIN — SODIUM CHLORIDE, POTASSIUM CHLORIDE, SODIUM LACTATE AND CALCIUM CHLORIDE 125 ML/HR: 600; 310; 30; 20 INJECTION, SOLUTION INTRAVENOUS at 18:25

## 2017-12-03 RX ADMIN — BENZOCAINE AND MENTHOL: 20; .5 SPRAY TOPICAL at 22:37

## 2017-12-03 RX ADMIN — BUTORPHANOL TARTRATE 1 MG: 2 INJECTION, SOLUTION INTRAMUSCULAR; INTRAVENOUS at 18:42

## 2017-12-03 RX ADMIN — IBUPROFEN 600 MG: 600 TABLET, FILM COATED ORAL at 22:38

## 2017-12-03 RX ADMIN — BENZOCAINE 1 APPLICATION: 5.6 OINTMENT TOPICAL at 22:37

## 2017-12-03 RX ADMIN — SODIUM CHLORIDE 15 MILLION UNITS: 9 INJECTION, SOLUTION INTRAVENOUS at 18:17

## 2017-12-03 NOTE — NURSING NOTE
CHRIS ANDERSM called on her cell phone informed of pt's arrival  , informed of gbs +,  Pt reports 3rd baby, srom 1 hour ago, and hx of fast labor,  Pt wanting to go natural.  Shyann will put orders in .

## 2017-12-03 NOTE — H&P
[facility]  Obstetric History and Physical    Chief Complaint   Patient presents with   • Laboring     srom       Subjective     Patient is a 32 y.o. female  currently at 38w0d, who presents with report of SROM around 1700, clear fluid. Strong regular contractions.  Baby active.  Starting to feel pelvic pressure with contractions.    Her prenatal care is significant for A1 DM.  Her previous obstetric/gynecological history  is remarkable for  tachycardia and gestational diabetes in each pregnancy .    The following portions of the patients history were reviewed and updated as appropriate: current medications, allergies, past medical history, past surgical history, past family history and past social history .       Prenatal Information:   Maternal Prenatal Labs  Blood Type No results found for: ABO   Rh Status No results found for: RH   Antibody Screen No results found for: ABSCRN   Gonnorhea No results found for: GCCX   Chlamydia No results found for: CLAMYDCU   RPR No results found for: RPR   Syphilis Antibody No results found for: SYPHILIS   Rubella No results found for: RUBELLAIGGIN   Hepatitis B Surface Antigen No results found for: HEPBSAG   HIV-1 Antibody No results found for: LABHIV1   Hepatitis C Antibody No results found for: HEPCAB   Rapid Urin Drug Screen No results found for: AMPMETHU, BARBITSCNUR, LABBENZSCN, LABMETHSCN, LABOPIASCN, THCURSCR, COCAINEUR, AMPHETSCREEN, PROPOXSCN, BUPRENORSCNU, METAMPSCNUR, OXYCODONESCN, TRICYCLICSCN   Group B Strep Culture No results found for: GBSANTIGEN                 Past OB History:       Obstetric History       T2      L2     SAB0   TAB0   Ectopic0   Multiple0   Live Births2       # Outcome Date GA Lbr [place]/2nd Weight Sex Delivery Anes PTL Lv   5 Current            4 SAB //16 6w0d          3 Term 14 39w0d  6 lb 14 oz (3.118 kg) M Vag-Spont None N LISA   2 SAB 13 6w0d          1 Term 12/13/10 38w0d  6 lb 13 oz (3.09 kg) M  Vag-Spont None N LISA          Past Medical History: Past Medical History:   Diagnosis Date   • Anxiety    • Gestational diabetes    • Normal vaginal delivery 2014   • Tachycardia    • Tachycardia     during all pregnancy's see's cardiology    • Varicose veins of left leg with edema       Past Surgical History No past surgical history on file.   Family History: Family History   Problem Relation Age of Onset   • Diabetes Mother    • Hypertension Mother    • Heart attack Father    • Diabetes Father    • Coronary artery disease Father    • Hyperlipidemia Father       Social History:  reports that she has never smoked. She has never used smokeless tobacco.   reports that she does not drink alcohol.   reports that she does not use illicit drugs.        General ROS: Pertinent items are noted in HPI, all other systems reviewed and negative    Objective     Vitals:    12/03/17 1849   BP: 137/92   Pulse: 107   Resp: 20   Temp:      Weight: Weight:  [152 lb (68.9 kg)] 152 lb (68.9 kg)     Physical Examination:   General Appearance: alert, well appearing, in intermittent distress secondary to contractions  Lungs: clear to auscultation, no wheezes, rales or rhonchi, symmetric air entry  Heart: regular rate and rhythm, murmur soft systolic  Abdomen: fundus soft, nontender between contractions 38  weeks size and FHT present  Back exam: no CVA tenderness   Extremities: no redness or tenderness in the calves or thighs, no edema  Skin: normal coloration and turgor, no rashes      Presentation: Vertex   Cervix: Exam by: Method: sterile exam per RN   Dilation: Dilation: 7   Effacement: Cervical Effacement: 90%   Station: Station: -1        Fetal Heart Rate Assessment   Method: Fetal HR Assessment Method: external   Beats/min: Fetal HR (Beats/Min): 150   Baseline: Fetal HR Baseline: normal range (110-160 bpm)   Varibility: Fetal HR Variability: moderate (amplitude range 6 to 25 bpm)   Accels: Fetal HR Accelerations: greater than/equal  to 15 bpm, lasting at least 15 seconds   Decels: Fetal HR Decelerations: absent   Tracing Category:       Uterine Assessment   Method: Method: TOCO (external toco transducer)   Frequency (min): Contraction Frequency (min): 2-4   Ctx Count in 10 min:     Duration: Contraction Duration (sec):    Intensity:     Intensity by IUPC:     Resting Tone:     Resting Tone by IUPC:     [place] Units:       Laboratory Results: CBC , PEP, blood type and Rh      Active Problems:    Normal labor    Pregnancy with 38 completed weeks gestation        Assessment:  1.  Intrauterine pregnancy at 38w0d weeks gestation with reactive, reassuring fetal status.    2.  labor  with ROM  3.  GBS status: No results found for: GBSANTIGEN    Plan:  1. fetal and uterine monitoring  intermittent, expectant management and analgesia with  parenteral narcotics  2. Plan of care has been reviewed with patient and   3.  Risks, benefits of treatment plan have been discussed.  4.  All questions have been answered.      Naye Khan CNM  12/3/2017  6:54 PM

## 2017-12-04 LAB
BASOPHILS # BLD AUTO: 0.01 10*3/MM3 (ref 0–0.2)
BASOPHILS NFR BLD AUTO: 0.1 % (ref 0–1)
DEPRECATED RDW RBC AUTO: 46.4 FL (ref 37–54)
EOSINOPHIL # BLD AUTO: 0.07 10*3/MM3 (ref 0–0.3)
EOSINOPHIL NFR BLD AUTO: 0.5 % (ref 0–3)
ERYTHROCYTE [DISTWIDTH] IN BLOOD BY AUTOMATED COUNT: 16.6 % (ref 11.3–14.5)
HCT VFR BLD AUTO: 24.5 % (ref 34.5–44)
HGB BLD-MCNC: 7.4 G/DL (ref 11.5–15.5)
IMM GRANULOCYTES # BLD: 0.06 10*3/MM3 (ref 0–0.03)
IMM GRANULOCYTES NFR BLD: 0.4 % (ref 0–0.6)
LYMPHOCYTES # BLD AUTO: 2.48 10*3/MM3 (ref 0.6–4.8)
LYMPHOCYTES NFR BLD AUTO: 17.7 % (ref 24–44)
MCH RBC QN AUTO: 23.4 PG (ref 27–31)
MCHC RBC AUTO-ENTMCNC: 30.2 G/DL (ref 32–36)
MCV RBC AUTO: 77.5 FL (ref 80–99)
MONOCYTES # BLD AUTO: 0.67 10*3/MM3 (ref 0–1)
MONOCYTES NFR BLD AUTO: 4.8 % (ref 0–12)
NEUTROPHILS # BLD AUTO: 10.75 10*3/MM3 (ref 1.5–8.3)
NEUTROPHILS NFR BLD AUTO: 76.5 % (ref 41–71)
PLATELET # BLD AUTO: 131 10*3/MM3 (ref 150–450)
PMV BLD AUTO: 9.1 FL (ref 6–12)
RBC # BLD AUTO: 3.16 10*6/MM3 (ref 3.89–5.14)
WBC NRBC COR # BLD: 14.04 10*3/MM3 (ref 3.5–10.8)

## 2017-12-04 PROCEDURE — 85025 COMPLETE CBC W/AUTO DIFF WBC: CPT | Performed by: ADVANCED PRACTICE MIDWIFE

## 2017-12-04 RX ORDER — FAMOTIDINE 20 MG/1
20 TABLET, FILM COATED ORAL 2 TIMES DAILY
Status: DISCONTINUED | OUTPATIENT
Start: 2017-12-04 | End: 2017-12-05 | Stop reason: HOSPADM

## 2017-12-04 RX ADMIN — DOCUSATE SODIUM 100 MG: 100 CAPSULE, LIQUID FILLED ORAL at 08:19

## 2017-12-04 RX ADMIN — DOCUSATE SODIUM 100 MG: 100 CAPSULE, LIQUID FILLED ORAL at 17:02

## 2017-12-04 RX ADMIN — Medication 325 MG: at 08:19

## 2017-12-04 RX ADMIN — ACETAMINOPHEN 650 MG: 325 TABLET, FILM COATED ORAL at 17:01

## 2017-12-04 RX ADMIN — Medication 325 MG: at 17:02

## 2017-12-04 RX ADMIN — PRENATAL VIT W/ FE FUMARATE-FA TAB 27-0.8 MG 1 TABLET: 27-0.8 TAB at 08:19

## 2017-12-04 RX ADMIN — IBUPROFEN 600 MG: 600 TABLET, FILM COATED ORAL at 13:01

## 2017-12-04 NOTE — SIGNIFICANT NOTE
Pt up to the bathroom with RN assist-gait steady.  Pt able to adequately void.  Marcia care per pt, pads, panties and clean gown applied

## 2017-12-04 NOTE — L&D DELIVERY NOTE
[facility]  Vaginal Delivery Note    Delivery     Delivery: Vaginal, Spontaneous Delivery     YOB: 2016    Time of Birth: 8:42 PM      Anesthesia: Local     Delivering clinician: Naye Khan    Forceps?   No   Vacuum? No    Shoulder dystocia present: No        Delivery narrative: Pushed well to a spontaneous vaginal delivery of viable female infant with 1 push.   Cord around right leg reduced.  Infant placed on mothers abdomen suctioned and dried.  Cord double clamped and cut.  Cord blood and cord segment obtained .  Spontaneous delivery of intact placenta with 3 vessel cord.  Perineal inspection revealed a second degree laceration. 1% lidocaine 10 cc  Local injection to adequate anesthesia.  Laceration repaired in usual fashion for approximation and hemostasis.  Mother and infant stable in the immediate PP period.    Infant    Findings: female  infant     Infant observations: Weight: 6 lb 0.7 oz (2.74 kg)   Length: 18.25  in  Observations/Comments:         Apgars: 8   @ 1 minute /    9   @ 5 minutes   Infant Name: Not available     Placenta, Cord, and Fluid    Placenta delivered  Spontaneous  at   12/3  8:50 PM     Cord: 3 vessels  present.   Nuchal Cord?  no   Cord blood obtained: Yes    Cord gases obtained:  No    Cord gas results: Venous:  No results found for: PHCVEN    Arterial:  No results found for: PHCART     Repair    Episiotomy: None    Lacerations: Yes  Laceration Information  Laceration Repaired?   Perineal: 2nd  Yes    Periurethral:         Labial:         Sulcus:         Vaginal: No       Cervical: No             Estimated Blood Loss: Est. Blood Loss (mL): 400 mL (Filed from Delivery Summary) (12/03/17 2042)     Suture used for repair: 3-0 chromic gut      Complications  none    Disposition  Mother to Mother Baby/Postpartum  in stable condition currently.  Baby to remains with mom  in stable condition currently.      Naye Khan CNM  12/03/17  9:13 PM

## 2017-12-04 NOTE — PLAN OF CARE
Problem: Labor (Cervical Ripen, Induct, Augment) (Adult,Obstetrics,Pediatric)  Goal: Signs and Symptoms of Listed Potential Problems Will be Absent or Manageable (Labor)  Outcome: Ongoing (interventions implemented as appropriate)    12/03/17 5802   Labor (Cervical Ripen, Induct, Augment)   Problems Assessed (Labor) all   Problems Present (Labor) none

## 2017-12-04 NOTE — PROGRESS NOTES
12/4/2017  PPD #1    Subjective   Tasha feels well.  Patient describes her lochia less than menses.  Pain is well controlled       Objective   Temp: Temp:  [98 °F (36.7 °C)-98.9 °F (37.2 °C)] 98 °F (36.7 °C) Temp src: Oral   BP: BP: (119-138)/(76-96) 119/76        Pulse: Heart Rate:  [] 104  RR: Resp:  [16-20] 16    General:  No acute distress   Abdomen: Fundus firm and beneath umbilicus   Pelvis: deferred     Lab Results   Component Value Date    WBC 14.04 (H) 12/04/2017    HGB 7.4 (L) 12/04/2017    HCT 24.5 (L) 12/04/2017    MCV 77.5 (L) 12/04/2017     (L) 12/04/2017    ABORH O Rh Positive 09/23/2014    RUBELLAIGGIN Immune 06/09/2014    HEPBSAG Non-Reactive 06/23/2017       Assessment  1. PPD# 1 after vaginal delivery    Plan  1. Routine postpartum care.      This note has been electronically signed.    Amber Coles, DO  December 4, 2017

## 2017-12-05 VITALS
WEIGHT: 152 LBS | TEMPERATURE: 97.9 F | SYSTOLIC BLOOD PRESSURE: 122 MMHG | BODY MASS INDEX: 25.95 KG/M2 | HEIGHT: 64 IN | RESPIRATION RATE: 16 BRPM | HEART RATE: 98 BPM | DIASTOLIC BLOOD PRESSURE: 81 MMHG

## 2017-12-05 PROBLEM — O24.419 DIABETES MELLITUS ARISING IN PREGNANCY: Status: RESOLVED | Noted: 2017-09-08 | Resolved: 2017-12-05

## 2017-12-05 PROBLEM — R00.0 FAST HEART BEAT: Status: RESOLVED | Noted: 2017-09-08 | Resolved: 2017-12-05

## 2017-12-05 RX ORDER — IBUPROFEN 800 MG/1
800 TABLET ORAL EVERY 6 HOURS PRN
Qty: 30 TABLET | Refills: 1 | Status: SHIPPED | OUTPATIENT
Start: 2017-12-05 | End: 2018-01-04

## 2017-12-05 RX ADMIN — FAMOTIDINE 20 MG: 20 TABLET, FILM COATED ORAL at 05:43

## 2017-12-05 RX ADMIN — PRENATAL VIT W/ FE FUMARATE-FA TAB 27-0.8 MG 1 TABLET: 27-0.8 TAB at 08:05

## 2017-12-05 RX ADMIN — Medication 325 MG: at 08:05

## 2017-12-05 RX ADMIN — DOCUSATE SODIUM 100 MG: 100 CAPSULE, LIQUID FILLED ORAL at 08:05

## 2017-12-05 RX ADMIN — IBUPROFEN 600 MG: 600 TABLET, FILM COATED ORAL at 08:05

## 2017-12-05 NOTE — DISCHARGE SUMMARY
Discharge Summary    Date of Admission: 12/3/2017  Date of Discharge:  2017      Patient: Tasha Mckinney      MR#:[id number]    Delivery Provider: Naye Khan     Discharge Surgeon/OB: Amber Coles    Presenting Problem/History of Present Illness  Normal labor [O80, Z37.9]     Patient Active Problem List   Diagnosis   • Acid reflux   • Diabetes mellitus arising in pregnancy   • Calculus of kidney   • Fast heart beat   • Normal spontaneous vaginal delivery         Discharge Diagnosis: Vaginal delivery at 38w0d    Procedures:  Vaginal, Spontaneous Delivery     12/3/2017    8:42 PM        Discharge Date: 2017;     Hospital Course  Patient is a 32 y.o. female  at 38w0d status post vaginal delivery without complication. Postpartum the patient did well. She remained afebrile, with vital signs stable. She was ready for discharge on postpartum day 2.     Infant:   female  fetus 6 lb 0.7 oz (2.74 kg)  with Apgar scores of 8  , 9   at five minutes.    Condition on Discharge:  Stable    Vital Signs  Temp:  [97.8 °F (36.6 °C)-98.3 °F (36.8 °C)] 97.9 °F (36.6 °C)  Heart Rate:  [] 98  Resp:  [16] 16  BP: (105-122)/(68-81) 122/81    Lab Results   Component Value Date    WBC 14.04 (H) 2017    HGB 7.4 (L) 2017    HCT 24.5 (L) 2017    MCV 77.5 (L) 2017     (L) 2017       Discharge Disposition      Discharge Medications   Tasha Mckinney   Home Medication Instructions DEMOND:[id number]    Printed on:17 0840   Medication Information                      Acetone, Urine, Test (KETONE TEST) strip  1 strip by In Vitro route Daily. Test urine ketones daily in the AM             acetone, urine, test (KETOSTIX) strip  Ketostix In Vitro Strip; Patient Sig: Ketostix In Vitro Strip Check urine q am; 1; 2; 04-Aug-2014; Active             Blood Glucose Monitoring Suppl (BLOOD GLUCOSE MONITOR SYSTEM) w/Device kit  1 Device Daily.             Glucose Blood (BLOOD GLUCOSE TEST)  strip  Test blood sugar QID             LANCETS ULTRA THIN misc  Test blood sugars QID             ONETOUCH DELICA LANCETS 33G misc  Test blood sugars QID             ONETOUCH DELICA LANCETS 33G misc               Prenatal Vit-DSS-Fe Fum-FA (PRENATAL 19) 29-1 MG tablet  Take  by mouth Daily.             raNITIdine (ZANTAC) 75 MG tablet  Take 75 mg by mouth 2 (Two) Times a Day.                 Discharge Diet:     Activity at Discharge:     Follow-up Appointments  Future Appointments  Date Time Provider Department Center   2/8/2018 10:00 AM Gia Hidalgo MD [facility] None     Dr. Coles 6 weeks    Amber Coles DO  12/05/17  8:40 AM  [facility]

## 2017-12-05 NOTE — PLAN OF CARE
Problem: Patient Care Overview (Adult)  Goal: Plan of Care Review  Outcome: Outcome(s) achieved Date Met:  12/05/17  Goal: Adult Individualization and Mutuality  Outcome: Outcome(s) achieved Date Met:  12/05/17  Goal: Discharge Needs Assessment  Outcome: Outcome(s) achieved Date Met:  12/05/17    Problem: Labor (Cervical Ripen, Induct, Augment) (Adult,Obstetrics,Pediatric)  Goal: Signs and Symptoms of Listed Potential Problems Will be Absent or Manageable (Labor)  Outcome: Outcome(s) achieved Date Met:  12/05/17    Problem: Postpartum, Vaginal Delivery (Adult)  Goal: Signs and Symptoms of Listed Potential Problems Will be Absent or Manageable (Postpartum, Vaginal Delivery)  Outcome: Outcome(s) achieved Date Met:  12/05/17

## 2019-12-27 PROBLEM — Z34.80 SUPERVISION OF OTHER NORMAL PREGNANCY: Status: ACTIVE | Noted: 2019-12-27

## 2019-12-27 PROBLEM — O09.299 H/O GESTATIONAL DIABETES IN PRIOR PREGNANCY, CURRENTLY PREGNANT: Status: ACTIVE | Noted: 2019-12-27

## 2019-12-27 PROBLEM — Z01.419 WELL WOMAN EXAM: Status: ACTIVE | Noted: 2019-12-27

## 2019-12-27 PROBLEM — Z86.32 H/O GESTATIONAL DIABETES IN PRIOR PREGNANCY, CURRENTLY PREGNANT: Status: ACTIVE | Noted: 2019-12-27

## 2019-12-27 PROBLEM — N20.0 CALCULUS OF KIDNEY: Status: RESOLVED | Noted: 2017-09-08 | Resolved: 2019-12-27

## 2020-01-03 ENCOUNTER — LAB REQUISITION (OUTPATIENT)
Dept: LAB | Facility: HOSPITAL | Age: 35
End: 2020-01-03

## 2020-01-03 ENCOUNTER — INITIAL PRENATAL (OUTPATIENT)
Dept: OBSTETRICS AND GYNECOLOGY | Facility: CLINIC | Age: 35
End: 2020-01-03

## 2020-01-03 VITALS — SYSTOLIC BLOOD PRESSURE: 108 MMHG | BODY MASS INDEX: 22.83 KG/M2 | DIASTOLIC BLOOD PRESSURE: 70 MMHG | WEIGHT: 133 LBS

## 2020-01-03 DIAGNOSIS — O09.299 H/O GESTATIONAL DIABETES IN PRIOR PREGNANCY, CURRENTLY PREGNANT: ICD-10-CM

## 2020-01-03 DIAGNOSIS — Z86.32 H/O GESTATIONAL DIABETES IN PRIOR PREGNANCY, CURRENTLY PREGNANT: ICD-10-CM

## 2020-01-03 DIAGNOSIS — Z00.00 ROUTINE GENERAL MEDICAL EXAMINATION AT A HEALTH CARE FACILITY: ICD-10-CM

## 2020-01-03 DIAGNOSIS — Z34.80 SUPERVISION OF OTHER NORMAL PREGNANCY: Primary | ICD-10-CM

## 2020-01-03 LAB
2ND TRIMESTER 4 SCREEN SERPL-IMP: NORMAL
EXTERNAL NIPT: NORMAL

## 2020-01-03 PROCEDURE — 36415 COLL VENOUS BLD VENIPUNCTURE: CPT | Performed by: OBSTETRICS & GYNECOLOGY

## 2020-01-03 PROCEDURE — 99204 OFFICE O/P NEW MOD 45 MIN: CPT | Performed by: OBSTETRICS & GYNECOLOGY

## 2020-01-03 NOTE — PROGRESS NOTES
Subjective   Chief Complaint   Patient presents with   • Initial Prenatal Visit       Tasha Mckinney is a 34 y.o. year old .  Patient's last menstrual period was 10/07/2019 (approximate).  She presents to be seen to initiate prenatal care.     Social History    Tobacco Use      Smoking status: Never Smoker      Smokeless tobacco: Never Used      The following portions of the patient's history were reviewed and updated as appropriate:vital signs, allergies, current medications, past medical history, past social history, past surgical history and problem list.    Objective   Lab Review   No data reviewed    Imaging   No data reviewed    Assessment/Plan   ASSESSMENT  1. 34 y.o. year old  at 12w4d  2. Supervision of low risk pregnancy    PLAN  1. The problem list for pregnancy was initiated today  2. Tests ordered today:  Orders Placed This Encounter   Procedures   • Urine Culture - Urine, Urine, Clean Catch   • Chlamydia trachomatis, Neisseria gonorrhoeae, PCR w/ confirmation - Urine, Urine, Clean Catch   • Obstetric Panel   • HIV-1 / O / 2 Ag / Antibody 4th Generation   • Urine Drug Screen - Urine, Clean Catch     Please confirm all positive UDS   • Hemoglobin A1c     3. Testing for GC / Chlamydia / trichomonas was done today  4. Genetic testing reviewed: they have considered the information and are not interested in having genetic testing performed.  5. Information reviewed: exercise in pregnancy, nutrition in pregnancy, weight gain in pregnancy, work and travel restrictions during pregnancy, list of OTC medications acceptable in pregnancy and call coverage groups    Total time spent today with Tasha  was 50 minutes (level 4).  Off this time, > 50% was spent face-to-face time coordinating care, answering her questions and counseling regarding exercise in pregnancy, nutrition in pregnancy, weight gain in pregnancy, work and travel restrictions during pregnancy, list of OTC medications acceptable in  pregnancy and call coverage groups.    Follow up: 4 week(s)       This note was electronically signed.    Paolo Brooks MD  January 3, 2020

## 2020-01-06 LAB
ABO GROUP BLD: NORMAL
AMPHETAMINES UR QL SCN: NEGATIVE NG/ML
BACTERIA UR CULT: NO GROWTH
BACTERIA UR CULT: NORMAL
BARBITURATES UR QL SCN: NEGATIVE NG/ML
BASOPHILS # BLD AUTO: 0 X10E3/UL (ref 0–0.2)
BASOPHILS NFR BLD AUTO: 0 %
BENZODIAZ UR QL SCN: NEGATIVE NG/ML
BLD GP AB SCN SERPL QL: NEGATIVE
BZE UR QL SCN: NEGATIVE NG/ML
C TRACH RRNA SPEC QL NAA+PROBE: NEGATIVE
CANNABINOIDS UR QL SCN: NEGATIVE NG/ML
CREAT UR-MCNC: 58.5 MG/DL (ref 20–300)
EOSINOPHIL # BLD AUTO: 0.1 X10E3/UL (ref 0–0.4)
EOSINOPHIL NFR BLD AUTO: 1 %
ERYTHROCYTE [DISTWIDTH] IN BLOOD BY AUTOMATED COUNT: 14.4 % (ref 12.3–15.4)
HBV SURFACE AG SERPL QL IA: NEGATIVE
HCT VFR BLD AUTO: 36.4 % (ref 34–46.6)
HCV AB S/CO SERPL IA: <0.1 S/CO RATIO (ref 0–0.9)
HGB BLD-MCNC: 12.7 G/DL (ref 11.1–15.9)
HIV 1+2 AB+HIV1 P24 AG SERPL QL IA: NON REACTIVE
IMM GRANULOCYTES # BLD AUTO: 0 X10E3/UL (ref 0–0.1)
IMM GRANULOCYTES NFR BLD AUTO: 0 %
LABORATORY COMMENT REPORT: NORMAL
LYMPHOCYTES # BLD AUTO: 1.7 X10E3/UL (ref 0.7–3.1)
LYMPHOCYTES NFR BLD AUTO: 25 %
MCH RBC QN AUTO: 29.8 PG (ref 26.6–33)
MCHC RBC AUTO-ENTMCNC: 34.9 G/DL (ref 31.5–35.7)
MCV RBC AUTO: 85 FL (ref 79–97)
METHADONE UR QL SCN: NEGATIVE NG/ML
MONOCYTES # BLD AUTO: 0.3 X10E3/UL (ref 0.1–0.9)
MONOCYTES NFR BLD AUTO: 5 %
N GONORRHOEA RRNA SPEC QL NAA+PROBE: NEGATIVE
NEUTROPHILS # BLD AUTO: 4.9 X10E3/UL (ref 1.4–7)
NEUTROPHILS NFR BLD AUTO: 69 %
OPIATES UR QL SCN: NEGATIVE NG/ML
OXYCODONE+OXYMORPHONE UR QL SCN: NEGATIVE NG/ML
PCP UR QL: NEGATIVE NG/ML
PH UR: 7 [PH] (ref 4.5–8.9)
PLATELET # BLD AUTO: 251 X10E3/UL (ref 150–450)
PROPOXYPH UR QL SCN: NEGATIVE NG/ML
RBC # BLD AUTO: 4.26 X10E6/UL (ref 3.77–5.28)
RH BLD: POSITIVE
RPR SER QL: NON REACTIVE
RUBV IGG SERPL IA-ACNC: 28 INDEX
WBC # BLD AUTO: 7.1 X10E3/UL (ref 3.4–10.8)

## 2020-02-05 ENCOUNTER — ROUTINE PRENATAL (OUTPATIENT)
Dept: OBSTETRICS AND GYNECOLOGY | Facility: CLINIC | Age: 35
End: 2020-02-05

## 2020-02-05 DIAGNOSIS — Z34.80 SUPERVISION OF OTHER NORMAL PREGNANCY: Primary | ICD-10-CM

## 2020-02-05 DIAGNOSIS — O44.02 PLACENTA PREVIA, SECOND TRIMESTER: ICD-10-CM

## 2020-02-05 PROCEDURE — 99213 OFFICE O/P EST LOW 20 MIN: CPT | Performed by: OBSTETRICS & GYNECOLOGY

## 2020-02-05 RX ORDER — OMEPRAZOLE 20 MG/1
20 CAPSULE, DELAYED RELEASE ORAL DAILY
COMMUNITY
End: 2020-08-24

## 2020-02-05 NOTE — PROGRESS NOTES
No chief complaint on file.      HPI: Tasha is a  currently at 17w2d who today reports the following:  Contractions - No; Leaking - No; Vaginal bleeding -  Started to have brisk vaginal bleeding this morning.  Has not had recent intercourse.; Heartburn - No.    ROS:  GI: Nausea - No ; Constipation - No; Diarrhea - No    Neuro: Headache - No ; Visual change - No      EXAM:  Vitals: See prenatal flowsheet   Abdomen: See prenatal flowsheet   Urine glucose/protein: See prenatal flowsheet   Pelvic: See prenatal flowsheet     Lab Results   Component Value Date    ABORH O Rh Positive 2014    ABO O 2020    RH Positive 2020    LABANTI Negative 2014    ABSCRN Negative 2020       MDM:  Impression: 1. Supervision of high risk pregnancy   2. Vaginal bleeding probably related to accessory placental lobe overlying internal cervical loss   Tests done today: 1. U/S for Assessment of bleeding.  Ultrasound demonstrates posterior placenta with probable accessory placental lobe over internal loss   Topics discussed: 1. Continue with PNV's  2. Prenatal labs reviewed  3. Emphasized the importance of bedrest with vaginal rest  4. Return immediately in the presence of increased vaginal bleeding  5. Explained increased risk of midtrimester loss given this finding   Tests scheduled today for her next visit:   U/S for anatomic screening and reassessment of placenta in 3 weeks time

## 2020-02-07 ENCOUNTER — TELEPHONE (OUTPATIENT)
Dept: OBSTETRICS AND GYNECOLOGY | Facility: CLINIC | Age: 35
End: 2020-02-07

## 2020-02-07 NOTE — TELEPHONE ENCOUNTER
Spoke with Tasha, she has concerns regarding her bleeding from her accessery lobe of the placenta.  Answered pt's questions at length and advised her to present to  with any heavy bleeding. Pt verbalizes understanding.

## 2020-02-07 NOTE — TELEPHONE ENCOUNTER
Provider Name  Dr Brooks    Reason for Call  Patient has questions, having  bleeding, would like nurse or Dr Brooks to call her    Call Back Number  [phone]

## 2020-02-27 ENCOUNTER — ROUTINE PRENATAL (OUTPATIENT)
Dept: OBSTETRICS AND GYNECOLOGY | Facility: CLINIC | Age: 35
End: 2020-02-27

## 2020-02-27 ENCOUNTER — TELEPHONE (OUTPATIENT)
Dept: OBSTETRICS AND GYNECOLOGY | Facility: CLINIC | Age: 35
End: 2020-02-27

## 2020-02-27 VITALS — DIASTOLIC BLOOD PRESSURE: 72 MMHG | BODY MASS INDEX: 23.34 KG/M2 | SYSTOLIC BLOOD PRESSURE: 118 MMHG | WEIGHT: 136 LBS

## 2020-02-27 DIAGNOSIS — Z34.80 SUPERVISION OF OTHER NORMAL PREGNANCY: Primary | ICD-10-CM

## 2020-02-27 DIAGNOSIS — O36.8390 MATERNAL CARE FOR FETAL TACHYCARDIA DURING PREGNANCY: ICD-10-CM

## 2020-02-27 PROCEDURE — 99213 OFFICE O/P EST LOW 20 MIN: CPT | Performed by: OBSTETRICS & GYNECOLOGY

## 2020-02-27 NOTE — PROGRESS NOTES
Chief Complaint   Patient presents with   • Routine Prenatal Visit       HPI: Tasha is a  currently at 20w3d who today reports the following:  Contractions - No; Leaking - No; Vaginal bleeding -  No; Heartburn - No.  She is been having some issues with high heart rate that is intermittent at times.  She is had that in the past but is always settled down.  Is been a little more troublesome this pregnancy.    ROS:  GI: Nausea - No ; Constipation - No; Diarrhea - No    Neuro: Headache - No ; Visual change - No      EXAM:  Vitals: See prenatal flowsheet   Abdomen: See prenatal flowsheet   Urine glucose/protein: See prenatal flowsheet   Pelvic: See prenatal flowsheet     Lab Results   Component Value Date    ABORH O Rh Positive 2014    ABO O 2020    RH Positive 2020    LABANTI Negative 2014    ABSCRN Negative 2020       MDM:  Impression: 1. Supervision of high risk pregnancy   2. Subjective maternal tachycardia   Tests done today: 1. U/S for anatomic screening - anatomy completely seen today.  Preliminary report talks about some form of a hypoechoic space superior to the cervix   Topics discussed: 1. Continue with PNV's  2. Prenatal labs reviewed  3. I like to get her set up for a 24-hour Holter as well as an echocardiogram   Tests scheduled today for her next visit:   none

## 2020-02-27 NOTE — TELEPHONE ENCOUNTER
Called patient with the U/S findings - explained there is an increased risk for PTL and PPROM.  OK to return to routine to activity but would still avoid intercourse.    Needs repeat U/S 30 weeks.

## 2020-03-13 ENCOUNTER — HOSPITAL ENCOUNTER (OUTPATIENT)
Dept: CARDIOLOGY | Facility: HOSPITAL | Age: 35
Discharge: HOME | End: 2020-03-13
Admitting: OBSTETRICS & GYNECOLOGY

## 2020-03-13 VITALS — BODY MASS INDEX: 23.22 KG/M2 | HEIGHT: 64 IN | WEIGHT: 136 LBS

## 2020-03-13 DIAGNOSIS — O36.8390 MATERNAL CARE FOR FETAL TACHYCARDIA DURING PREGNANCY: ICD-10-CM

## 2020-03-13 LAB
BH CV ECHO MEAS - AO MAX PG (FULL): 5.5 MMHG
BH CV ECHO MEAS - AO MAX PG: 9.2 MMHG
BH CV ECHO MEAS - AO MEAN PG (FULL): 3.4 MMHG
BH CV ECHO MEAS - AO MEAN PG: 5 MMHG
BH CV ECHO MEAS - AO ROOT AREA (BSA CORRECTED): 1.4
BH CV ECHO MEAS - AO ROOT AREA: 4.4 CM^2
BH CV ECHO MEAS - AO ROOT DIAM: 2.4 CM
BH CV ECHO MEAS - AO V2 MAX: 151.3 CM/SEC
BH CV ECHO MEAS - AO V2 MEAN: 106 CM/SEC
BH CV ECHO MEAS - AO V2 VTI: 26.9 CM
BH CV ECHO MEAS - AVA(I,A): 1.4 CM^2
BH CV ECHO MEAS - AVA(I,D): 1.4 CM^2
BH CV ECHO MEAS - AVA(V,A): 1.5 CM^2
BH CV ECHO MEAS - AVA(V,D): 1.5 CM^2
BH CV ECHO MEAS - BSA(HAYCOCK): 1.7 M^2
BH CV ECHO MEAS - BSA: 1.7 M^2
BH CV ECHO MEAS - BZI_BMI: 23.3 KILOGRAMS/M^2
BH CV ECHO MEAS - BZI_METRIC_HEIGHT: 162.6 CM
BH CV ECHO MEAS - BZI_METRIC_WEIGHT: 61.7 KG
BH CV ECHO MEAS - EDV(CUBED): 92.3 ML
BH CV ECHO MEAS - EDV(MOD-SP2): 48 ML
BH CV ECHO MEAS - EDV(MOD-SP4): 75 ML
BH CV ECHO MEAS - EDV(TEICH): 93.4 ML
BH CV ECHO MEAS - EF(CUBED): 58.9 %
BH CV ECHO MEAS - EF(MOD-BP): 56 %
BH CV ECHO MEAS - EF(MOD-SP2): 47.9 %
BH CV ECHO MEAS - EF(MOD-SP4): 57.3 %
BH CV ECHO MEAS - EF(TEICH): 50.7 %
BH CV ECHO MEAS - ESV(CUBED): 37.9 ML
BH CV ECHO MEAS - ESV(MOD-SP2): 25 ML
BH CV ECHO MEAS - ESV(MOD-SP4): 32 ML
BH CV ECHO MEAS - ESV(TEICH): 46 ML
BH CV ECHO MEAS - FS: 25.7 %
BH CV ECHO MEAS - IVS/LVPW: 0.97
BH CV ECHO MEAS - IVSD: 0.78 CM
BH CV ECHO MEAS - LA DIMENSION: 2.3 CM
BH CV ECHO MEAS - LA/AO: 0.99
BH CV ECHO MEAS - LAD MAJOR: 4.3 CM
BH CV ECHO MEAS - LAT PEAK E' VEL: 12.2 CM/SEC
BH CV ECHO MEAS - LATERAL E/E' RATIO: 5.6
BH CV ECHO MEAS - LV DIASTOLIC VOL/BSA (35-75): 45.2 ML/M^2
BH CV ECHO MEAS - LV MASS(C)D: 112.9 GRAMS
BH CV ECHO MEAS - LV MASS(C)DI: 68 GRAMS/M^2
BH CV ECHO MEAS - LV MAX PG: 3.6 MMHG
BH CV ECHO MEAS - LV MEAN PG: 1.6 MMHG
BH CV ECHO MEAS - LV SYSTOLIC VOL/BSA (12-30): 19.3 ML/M^2
BH CV ECHO MEAS - LV V1 MAX: 95.3 CM/SEC
BH CV ECHO MEAS - LV V1 MEAN: 56.7 CM/SEC
BH CV ECHO MEAS - LV V1 VTI: 15.4 CM
BH CV ECHO MEAS - LVIDD: 4.5 CM
BH CV ECHO MEAS - LVIDS: 3.4 CM
BH CV ECHO MEAS - LVLD AP2: 6.7 CM
BH CV ECHO MEAS - LVLD AP4: 7.5 CM
BH CV ECHO MEAS - LVLS AP2: 6 CM
BH CV ECHO MEAS - LVLS AP4: 6 CM
BH CV ECHO MEAS - LVOT AREA (M): 2.5 CM^2
BH CV ECHO MEAS - LVOT AREA: 2.4 CM^2
BH CV ECHO MEAS - LVOT DIAM: 1.7 CM
BH CV ECHO MEAS - LVPWD: 0.8 CM
BH CV ECHO MEAS - MED PEAK E' VEL: 6.5 CM/SEC
BH CV ECHO MEAS - MEDIAL E/E' RATIO: 10.4
BH CV ECHO MEAS - MV A MAX VEL: 70.5 CM/SEC
BH CV ECHO MEAS - MV DEC TIME: 0.25 SEC
BH CV ECHO MEAS - MV E MAX VEL: 68.7 CM/SEC
BH CV ECHO MEAS - MV E/A: 0.98
BH CV ECHO MEAS - PA ACC SLOPE: 684.4 CM/SEC^2
BH CV ECHO MEAS - PA ACC TIME: 0.12 SEC
BH CV ECHO MEAS - PA PR(ACCEL): 24.3 MMHG
BH CV ECHO MEAS - PI END-D VEL: 80.5 CM/SEC
BH CV ECHO MEAS - PULM DIAS VEL: 35.7 CM/SEC
BH CV ECHO MEAS - PULM S/D: 1.4
BH CV ECHO MEAS - PULM SYS VEL: 49 CM/SEC
BH CV ECHO MEAS - SI(AO): 71.3 ML/M^2
BH CV ECHO MEAS - SI(CUBED): 32.8 ML/M^2
BH CV ECHO MEAS - SI(LVOT): 22 ML/M^2
BH CV ECHO MEAS - SI(MOD-SP2): 13.9 ML/M^2
BH CV ECHO MEAS - SI(MOD-SP4): 25.9 ML/M^2
BH CV ECHO MEAS - SI(TEICH): 28.5 ML/M^2
BH CV ECHO MEAS - SV(AO): 118.4 ML
BH CV ECHO MEAS - SV(CUBED): 54.4 ML
BH CV ECHO MEAS - SV(LVOT): 36.5 ML
BH CV ECHO MEAS - SV(MOD-SP2): 23 ML
BH CV ECHO MEAS - SV(MOD-SP4): 43 ML
BH CV ECHO MEAS - SV(TEICH): 47.3 ML
BH CV ECHO MEAS - TAPSE (>1.6): 1.5 CM2
BH CV ECHO MEASUREMENTS AVERAGE E/E' RATIO: 7.35
BH CV VAS BP LEFT ARM: NORMAL MMHG
BH CV XLRA - RV BASE: 2.9 CM
BH CV XLRA - RV LENGTH: 7.9 CM
BH CV XLRA - RV MID: 3.2 CM
BH CV XLRA - TDI S': 12.5 CM/SEC
LEFT ATRIUM VOLUME INDEX: 13.3 ML/M^2
LEFT ATRIUM VOLUME: 22 ML
LV EF 2D ECHO EST: 55 %
MAXIMAL PREDICTED HEART RATE: 186 BPM
STRESS TARGET HR: 158 BPM

## 2020-03-13 PROCEDURE — 93306 TTE W/DOPPLER COMPLETE: CPT

## 2020-03-13 PROCEDURE — 93306 TTE W/DOPPLER COMPLETE: CPT | Performed by: INTERNAL MEDICINE

## 2020-03-17 ENCOUNTER — TELEPHONE (OUTPATIENT)
Dept: OBSTETRICS AND GYNECOLOGY | Facility: CLINIC | Age: 35
End: 2020-03-17

## 2020-03-24 ENCOUNTER — TELEPHONE (OUTPATIENT)
Dept: OBSTETRICS AND GYNECOLOGY | Facility: CLINIC | Age: 35
End: 2020-03-24

## 2020-03-24 DIAGNOSIS — Z34.80 SUPERVISION OF OTHER NORMAL PREGNANCY: Primary | ICD-10-CM

## 2020-03-24 NOTE — TELEPHONE ENCOUNTER
Dr Brooks    Patient calling to see if she can reschedule her appointment on 3/26/2020 out 4 weeks. She wants to know if you will put an order for an ultra sound for 4 weeks also. Patient last seen on 2/27/2020.    Pt call back [phone]

## 2020-03-25 NOTE — TELEPHONE ENCOUNTER
I think it is fine to reschedule the upcoming appointment.  Lets get her set up to see us in 4 weeks time.  She does need to have her glucose challenge test done at that 4-week visit.  I have placed orders.  Make sure she is aware that she will need to come early to get blood work done.  As far as ultrasound goes.  I do want a get one done but not until roughly 32 weeks.

## 2020-03-25 NOTE — TELEPHONE ENCOUNTER
Spoke with Tasha advising her of Dr Brooks message. Tasha verbalized understanding. Appt rescheduled for 4/23/20

## 2020-04-08 ENCOUNTER — TELEPHONE (OUTPATIENT)
Dept: OBSTETRICS AND GYNECOLOGY | Facility: CLINIC | Age: 35
End: 2020-04-08

## 2020-04-08 NOTE — TELEPHONE ENCOUNTER
Tasha called office c/o restless legs and calf pain each night keeping her from sleeping. Asking for advice from Dr Brooks. Tasha has tried massage and warm baths w/o relief.

## 2020-04-23 ENCOUNTER — ROUTINE PRENATAL (OUTPATIENT)
Dept: OBSTETRICS AND GYNECOLOGY | Facility: CLINIC | Age: 35
End: 2020-04-23

## 2020-04-23 ENCOUNTER — LAB REQUISITION (OUTPATIENT)
Dept: LAB | Facility: HOSPITAL | Age: 35
End: 2020-04-23

## 2020-04-23 VITALS — SYSTOLIC BLOOD PRESSURE: 122 MMHG | BODY MASS INDEX: 25.06 KG/M2 | DIASTOLIC BLOOD PRESSURE: 74 MMHG | WEIGHT: 146 LBS

## 2020-04-23 DIAGNOSIS — Z34.82 PRENATAL CARE, SUBSEQUENT PREGNANCY IN SECOND TRIMESTER: Primary | ICD-10-CM

## 2020-04-23 DIAGNOSIS — Z00.00 ROUTINE GENERAL MEDICAL EXAMINATION AT A HEALTH CARE FACILITY: ICD-10-CM

## 2020-04-23 DIAGNOSIS — Z34.80 SUPERVISION OF OTHER NORMAL PREGNANCY: ICD-10-CM

## 2020-04-23 DIAGNOSIS — G25.81 RESTLESS LEG SYNDROME: ICD-10-CM

## 2020-04-23 PROBLEM — O24.410 DIET CONTROLLED GESTATIONAL DIABETES MELLITUS (GDM) IN THIRD TRIMESTER: Status: ACTIVE | Noted: 2019-12-27

## 2020-04-23 LAB
HCT VFR BLD AUTO: 32.5 % (ref 34–46.6)
HGB BLD-MCNC: 11.1 G/DL (ref 12–15.9)

## 2020-04-23 PROCEDURE — 36415 COLL VENOUS BLD VENIPUNCTURE: CPT | Performed by: OBSTETRICS & GYNECOLOGY

## 2020-04-23 PROCEDURE — 99213 OFFICE O/P EST LOW 20 MIN: CPT | Performed by: OBSTETRICS & GYNECOLOGY

## 2020-04-23 NOTE — PROGRESS NOTES
Chief Complaint   Patient presents with   • Routine Prenatal Visit       HPI: Tasha is a  currently at 28w3d who today reports the following:  Contractions - No; Leaking - No; Vaginal bleeding - No; Heartburn - No.  Having bilateral restless leg symptoms.  She wants to avoid GCT - does FSBS at home and with diet changes FSBS are OK    ROS:  GI: Nausea - No ; Constipation - No; Diarrhea - No    Neuro: Headache - No ; Visual change - No    Respiratory: Cough - No; SOB - No; fever - No     EXAM:  Vitals: See prenatal flowsheet   Abdomen: See prenatal flowsheet   Urine glucose/protein: See prenatal flowsheet   Pelvic: See prenatal flowsheet     Lab Results   Component Value Date    ABORH O Rh Positive 2014    ABO O 2020    RH Positive 2020    LABANTI Negative 2014    ABSCRN Negative 2020       MDM:  Impression: 1. Supervision of high risk pregnancy   2. Restless leg syndrome  3. Accessory placental lobe  4. GDMA   Tests ordered/done today: 1. HgB   Counselin. Prenatal labs reviewed  2. Need to continue with PNV's  3. Questions answered regarding COVID-19 and revision of office schedule of visits due to pandemic   4. Trying leg stretching and yoga  5. FSBS goals reviewed (fasting < 105 and 2 hour pp < 120)  6. Repeat U/S at 32 weeks (unless has bleeding prior)   Tests ordered today for her next visit:   none     New Medications Ordered This Visit   Medications   • glucose blood (FREESTYLE LITE) test strip     Sig: Check FSBS 4x/day     Dispense:  200 each     Refill:  4

## 2020-04-24 ENCOUNTER — TELEPHONE (OUTPATIENT)
Dept: OBSTETRICS AND GYNECOLOGY | Facility: CLINIC | Age: 35
End: 2020-04-24

## 2020-04-24 PROBLEM — O99.012 ANEMIA IN PREGNANCY, SECOND TRIMESTER: Status: ACTIVE | Noted: 2020-04-24

## 2020-04-24 RX ORDER — IRON, FOLIC ACID, CYANOCOBALAMIN, ASCORBIC ACID, AND DOCUSATE SODIUM 90; 1; 12; 120; 50 MG/1; MG/1; UG/1; MG/1; MG/1
TABLET, FILM COATED ORAL
Qty: 150 EACH | Refills: 1 | Status: SHIPPED | OUTPATIENT
Start: 2020-04-24 | End: 2020-06-18

## 2020-04-24 RX ORDER — FERROUS SULFATE 325(65) MG
325 TABLET ORAL
Qty: 60 TABLET | Refills: 4 | Status: SHIPPED | OUTPATIENT
Start: 2020-04-24 | End: 2020-05-07

## 2020-04-24 NOTE — TELEPHONE ENCOUNTER
----- Message from Paolo Brooks MD sent at 4/24/2020  8:46 AM EDT -----  Please call Tasha regarding her test results.    She is anemic and needs more iron.  A prescription for iron has been called into her pharmacy.  She should start with that once daily for the first week.  So long as constipation is minimal, increase to twice daily and continue through delivery.

## 2020-04-24 NOTE — TELEPHONE ENCOUNTER
Patient advised of results showing anemia and prescription for ferrous sulfate has been called in.  She was advised to take one daily for one week and to increase to twice daily if no or minimal constipation.  Patient verbally understood recommendation but request Ferralet 90 called instead of Ferrous sulfate.  Prescription changed and sent in.

## 2020-05-07 ENCOUNTER — TELEMEDICINE (OUTPATIENT)
Dept: OBSTETRICS AND GYNECOLOGY | Facility: CLINIC | Age: 35
End: 2020-05-07

## 2020-05-07 DIAGNOSIS — G25.81 RESTLESS LEG SYNDROME: ICD-10-CM

## 2020-05-07 DIAGNOSIS — Z34.83 PRENATAL CARE, SUBSEQUENT PREGNANCY IN THIRD TRIMESTER: ICD-10-CM

## 2020-05-07 DIAGNOSIS — O24.410 DIET CONTROLLED GESTATIONAL DIABETES MELLITUS (GDM) IN THIRD TRIMESTER: ICD-10-CM

## 2020-05-07 DIAGNOSIS — O44.02 PLACENTA PREVIA, SECOND TRIMESTER: ICD-10-CM

## 2020-05-07 DIAGNOSIS — O99.012 ANEMIA IN PREGNANCY, SECOND TRIMESTER: ICD-10-CM

## 2020-05-07 PROCEDURE — 99213 OFFICE O/P EST LOW 20 MIN: CPT | Performed by: OBSTETRICS & GYNECOLOGY

## 2020-05-07 NOTE — PROGRESS NOTES
Chief Complaint   Patient presents with   • Routine Prenatal Visit       You have chosen to receive care through a telehealth visit.  Do you consent to use a video/audio connection for your medical care today? YES    HPI: Tasha is a  currently at 30w3d who presents via video-conference reporting the following:  Contractions - No; Leaking - No; Vaginal bleeding -  No; Swelling of extremities - No.  Is checking fingerstick blood sugars.  Is getting less than 100 almost always.  Heartburn is starting to become a problem as his nausea  Restless leg is overall been stable    ROS:  GI: Nausea - YES; Constipation - No; Diarrhea - No    Neuro: Headache - No; Visual change - No    Respiratory: Cough - No; SOB - No; fever - No     EXAM:  Vitals: See prenatal flowsheet   Abdomen: See prenatal flowsheet   Urine glucose/protein: See prenatal flowsheet   Pelvic: See prenatal flowsheet   MDM:   Impression: 1. Supervision of high risk pregnancy   2. Gestational diabetes with good blood sugar control  3. Placenta previa with accessory placental lobe  4. Heartburn with nausea -not taking Prilosec as recommended  5. Anemia of pregnancy  6. Restless leg   Tests done today: 1. none   Topics discussed: 1. Continue with PNV's  2. Prenatal labs reviewed  3. Questions answered regarding COVID-19 and revision of office schedule of visits due to pandemic  4. Breast feeding  5. Pediatrician selection   6. Would start taking the Prilosec more consistently   Tests scheduled today for her next visit:   none  U/S for f/u of placental location     Total time spent today with Tasha  was 20 minutes (level 3).  Off this time, 100% was spent coordinating care, answering her questions and counseling regarding exercise in pregnancy, nutrition in pregnancy, weight gain in pregnancy, work and travel restrictions during pregnancy, list of OTC medications acceptable in pregnancy and call coverage groups

## 2020-05-20 ENCOUNTER — ROUTINE PRENATAL (OUTPATIENT)
Dept: OBSTETRICS AND GYNECOLOGY | Facility: CLINIC | Age: 35
End: 2020-05-20

## 2020-05-20 VITALS — BODY MASS INDEX: 24.55 KG/M2 | WEIGHT: 143 LBS | DIASTOLIC BLOOD PRESSURE: 72 MMHG | SYSTOLIC BLOOD PRESSURE: 118 MMHG

## 2020-05-20 DIAGNOSIS — O24.410 DIET CONTROLLED GESTATIONAL DIABETES MELLITUS (GDM) IN THIRD TRIMESTER: ICD-10-CM

## 2020-05-20 DIAGNOSIS — Z34.83 PRENATAL CARE, SUBSEQUENT PREGNANCY IN THIRD TRIMESTER: Primary | ICD-10-CM

## 2020-05-20 PROBLEM — O44.02 PLACENTA PREVIA, SECOND TRIMESTER: Status: RESOLVED | Noted: 2020-02-05 | Resolved: 2020-05-20

## 2020-05-20 LAB
GLUCOSE UR STRIP-MCNC: NEGATIVE MG/DL
PROT UR STRIP-MCNC: NEGATIVE MG/DL

## 2020-05-20 PROCEDURE — 99213 OFFICE O/P EST LOW 20 MIN: CPT | Performed by: OBSTETRICS & GYNECOLOGY

## 2020-05-20 NOTE — PROGRESS NOTES
Chief Complaint   Patient presents with   • Routine Prenatal Visit       HPI: Tasha is a  currently at 32w2d who today reports the following:  Contractions - No; Leaking - No; Vaginal bleeding -  No; Swelling of extremities - No.  FSBS have been good  When she was last here heartburn was a problem.  She was not taking Prilosec correctly.  She is adjusted the timing and she feels better.    ROS:  GI: Nausea - No; Constipation - No; Diarrhea - No    Neuro: Headache - No; Visual change - No    Respiratory: Cough - No; SOB - No; fever - No     EXAM:  Vitals: See prenatal flowsheet   Abdomen: See prenatal flowsheet   Urine glucose/protein: See prenatal flowsheet   Pelvic: See prenatal flowsheet   MDM:   Impression: 1. Supervision of high risk pregnancy  2. DM - GDMA1   3. GERD - better  4. Prior placenta previa with accessory placental lobe   Tests done today: 1. U/S for EFW - S=D  2. U/S for f/u of placental location - No evidence of placenta previa or vasa previa on today's ultrasound   Topics discussed: 1. Continue with PNV's  2. Prenatal labs reviewed  3. Questions answered regarding COVID-19 and revision of office schedule of visits due to pandemic  4. No additional counseling given - she has no specific complaints or concerns   Tests scheduled today for her next visit:   none

## 2020-06-03 ENCOUNTER — PRIOR AUTHORIZATION (OUTPATIENT)
Dept: OBSTETRICS AND GYNECOLOGY | Facility: CLINIC | Age: 35
End: 2020-06-03

## 2020-06-03 ENCOUNTER — TELEMEDICINE (OUTPATIENT)
Dept: OBSTETRICS AND GYNECOLOGY | Facility: CLINIC | Age: 35
End: 2020-06-03

## 2020-06-03 DIAGNOSIS — Z34.83 PRENATAL CARE, SUBSEQUENT PREGNANCY IN THIRD TRIMESTER: Primary | ICD-10-CM

## 2020-06-03 DIAGNOSIS — O24.410 DIET CONTROLLED GESTATIONAL DIABETES MELLITUS (GDM) IN THIRD TRIMESTER: ICD-10-CM

## 2020-06-03 DIAGNOSIS — O99.012 ANEMIA IN PREGNANCY, SECOND TRIMESTER: ICD-10-CM

## 2020-06-03 PROCEDURE — 99213 OFFICE O/P EST LOW 20 MIN: CPT | Performed by: OBSTETRICS & GYNECOLOGY

## 2020-06-03 NOTE — PROGRESS NOTES
Chief Complaint   Patient presents with   • Routine Prenatal Visit       You have chosen to receive care through a telehealth visit.  Do you consent to use a video/audio connection for your medical care today? YES    HPI: Tasha is a  currently at 34w2d who presents via video-conference reporting the following:  Contractions - YES - but less than 4/hour AND no associated change in vaginal discharge; Leaking - No; Vaginal bleeding -  No; Swelling of extremities - No.  FSBS's are good.  Have been < 120  Has a mole in vulvar region that has been bleeding    ROS:  GI: Nausea - No; Constipation - No; Diarrhea - No    Neuro: Headache - No; Visual change - No    Respiratory: Cough - No; SOB - No; fever - No     EXAM:  Vitals: See prenatal flowsheet   Abdomen: See prenatal flowsheet   Urine glucose/protein: See prenatal flowsheet   Pelvic: See prenatal flowsheet   MDM:   Impression: 1. Supervision of high risk pregnancy  2. DM - GDMA1  3. Anemia in pregnancy   Tests done today: 1. none   Topics discussed: 1. Continue with PNV's  2. Prenatal labs reviewed  3. Questions answered regarding COVID-19 and revision of office schedule of visits due to pandemic  4. No additional counseling given - she has no specific complaints or concerns   Tests scheduled today for her next visit:   U/S for EFW   Check vulvar mole at the time of next check     Total time spent today with Tasha  was 20 minutes (level 3).  Off this time, 100% was spent coordinating care, answering her questions and counseling regarding exercise in pregnancy, nutrition in pregnancy, weight gain in pregnancy, work and travel restrictions during pregnancy, list of OTC medications acceptable in pregnancy and call coverage groups

## 2020-06-17 ENCOUNTER — TELEPHONE (OUTPATIENT)
Dept: OBSTETRICS AND GYNECOLOGY | Facility: CLINIC | Age: 35
End: 2020-06-17

## 2020-06-17 ENCOUNTER — PRIOR AUTHORIZATION (OUTPATIENT)
Dept: OBSTETRICS AND GYNECOLOGY | Facility: CLINIC | Age: 35
End: 2020-06-17

## 2020-06-17 NOTE — TELEPHONE ENCOUNTER
LIN UREÑA PT SAYS RX FOR FERROUS WAS DENIED BECAUSE RX SAID TWICE A DAY. IT NEEDS TO SAY ONCE A DAY      PHARM  [facility] IN [facility]

## 2020-06-17 NOTE — TELEPHONE ENCOUNTER
Left message to call office for verification of Ferralet.  (Prescribed as one daily x 1 week then twice daily)

## 2020-06-18 RX ORDER — FERROUS SULFATE 325(65) MG
325 TABLET ORAL
Qty: 60 TABLET | Refills: 4 | Status: SHIPPED | OUTPATIENT
Start: 2020-06-18 | End: 2020-06-18

## 2020-06-18 RX ORDER — IRON, FOLIC ACID, CYANOCOBALAMIN, ASCORBIC ACID, AND DOCUSATE SODIUM 90; 1; 12; 120; 50 MG/1; MG/1; UG/1; MG/1; MG/1
TABLET, FILM COATED ORAL
Qty: 150 EACH | Refills: 1 | Status: SHIPPED | OUTPATIENT
Start: 2020-06-18

## 2020-06-18 NOTE — TELEPHONE ENCOUNTER
Done    New Medications Ordered This Visit   Medications   • ferrous sulfate 325 (65 FE) MG tablet     Sig: Take 1 tablet by mouth 2 (Two) Times a Day Before Meals for 150 days.     Dispense:  60 tablet     Refill:  4

## 2020-06-19 ENCOUNTER — ROUTINE PRENATAL (OUTPATIENT)
Dept: OBSTETRICS AND GYNECOLOGY | Facility: CLINIC | Age: 35
End: 2020-06-19

## 2020-06-19 VITALS — BODY MASS INDEX: 25.92 KG/M2 | SYSTOLIC BLOOD PRESSURE: 120 MMHG | DIASTOLIC BLOOD PRESSURE: 72 MMHG | WEIGHT: 151 LBS

## 2020-06-19 DIAGNOSIS — Z34.83 PRENATAL CARE, SUBSEQUENT PREGNANCY IN THIRD TRIMESTER: Primary | ICD-10-CM

## 2020-06-19 DIAGNOSIS — O24.410 DIET CONTROLLED GESTATIONAL DIABETES MELLITUS (GDM) IN THIRD TRIMESTER: ICD-10-CM

## 2020-06-19 DIAGNOSIS — O41.02X0 OLIGOHYDRAMNIOS IN SECOND TRIMESTER, SINGLE OR UNSPECIFIED FETUS: ICD-10-CM

## 2020-06-19 PROCEDURE — 99213 OFFICE O/P EST LOW 20 MIN: CPT | Performed by: OBSTETRICS & GYNECOLOGY

## 2020-06-19 NOTE — PROGRESS NOTES
Chief Complaint   Patient presents with   • Routine Prenatal Visit       HPI: Tasha is a  currently at 36w4d who today reports the following:  Contractions - No; Leaking - No; Vaginal bleeding -  No; Swelling of extremities - No.  Heartburn is getting significant in the evening.  She takes Prilosec every morning.  She is been starting to take Pepcid in the evening.    ROS:  GI: Nausea - No; Constipation - No; Diarrhea - No    Neuro: Headache - No; Visual change - No    Respiratory: Cough - No; SOB - No; fever - No     EXAM:  Vitals: See prenatal flowsheet   Abdomen: See prenatal flowsheet   Urine glucose/protein: See prenatal flowsheet   Pelvic: SSE - pool (-) and pH normal   MDM:   Impression: 1. Supervision of high risk pregnancy  2. DM - GDMA1  3. GERD in pregnancy   4. Borderline low AUDRA w/o evidence of ROM   Tests done today: 1. GBS testing  2. U/S for EFW - S=D but AUDRA low   Topics discussed: 1. Continue with PNV's  2. Prenatal labs reviewed  3. Questions answered regarding COVID-19 and revision of office schedule of visits due to pandemic   4. Have encouraged her to start taking Prilosec in the early evening as well and then may take Pepcid at bedtime if necessary  5. Increase PO fluids   Tests scheduled today for her next visit:   U/S for AUDRA only

## 2020-06-22 ENCOUNTER — ROUTINE PRENATAL (OUTPATIENT)
Dept: OBSTETRICS AND GYNECOLOGY | Facility: CLINIC | Age: 35
End: 2020-06-22

## 2020-06-22 VITALS — WEIGHT: 152 LBS | SYSTOLIC BLOOD PRESSURE: 126 MMHG | DIASTOLIC BLOOD PRESSURE: 74 MMHG | BODY MASS INDEX: 26.09 KG/M2

## 2020-06-22 DIAGNOSIS — O24.410 DIET CONTROLLED GESTATIONAL DIABETES MELLITUS (GDM) IN THIRD TRIMESTER: ICD-10-CM

## 2020-06-22 DIAGNOSIS — Z34.83 PRENATAL CARE, SUBSEQUENT PREGNANCY IN THIRD TRIMESTER: Primary | ICD-10-CM

## 2020-06-22 PROBLEM — O41.02X0: Status: RESOLVED | Noted: 2020-06-19 | Resolved: 2020-06-22

## 2020-06-22 LAB — GP B STREP RRNA SPEC QL PROBE: POSITIVE

## 2020-06-22 PROCEDURE — 99213 OFFICE O/P EST LOW 20 MIN: CPT | Performed by: OBSTETRICS & GYNECOLOGY

## 2020-06-22 NOTE — PROGRESS NOTES
Chief Complaint   Patient presents with   • Routine Prenatal Visit       HPI: Tasha is a  currently at 37w0d who today reports the following:  Contractions - YES - but less than 4/hour AND no associated change in vaginal discharge; Leaking - No; Vaginal bleeding -  No; Swelling of extremities - No.    ROS:  GI: Nausea - No; Constipation - No; Diarrhea - No    Neuro: Headache - No; Visual change - No    Respiratory: Cough - No; SOB - No; fever - No     EXAM:  Vitals: See prenatal flowsheet   Abdomen: See prenatal flowsheet   Urine glucose/protein: See prenatal flowsheet   Pelvic: See prenatal flowsheet   MDM:   Impression: 1. Supervision of high risk pregnancy  2. DM - GDMA1  3. Oligohydramnios-resolved   Tests done today: 1. U/S for AUDRA only - 8.1   Topics discussed: 1. Continue with PNV's  2. Prenatal labs reviewed  3. Questions answered regarding COVID-19 and revision of office schedule of visits due to pandemic  4. No additional counseling given - she has no specific complaints or concerns   5. Continue to force fluids   Tests scheduled today for her next visit:   U/S for AUDRA only

## 2020-06-29 ENCOUNTER — ROUTINE PRENATAL (OUTPATIENT)
Dept: OBSTETRICS AND GYNECOLOGY | Facility: CLINIC | Age: 35
End: 2020-06-29

## 2020-06-29 ENCOUNTER — DOCUMENTATION (OUTPATIENT)
Dept: OBSTETRICS AND GYNECOLOGY | Facility: CLINIC | Age: 35
End: 2020-06-29

## 2020-06-29 VITALS — DIASTOLIC BLOOD PRESSURE: 60 MMHG | BODY MASS INDEX: 26.09 KG/M2 | SYSTOLIC BLOOD PRESSURE: 120 MMHG | WEIGHT: 152 LBS

## 2020-06-29 DIAGNOSIS — O99.820 GROUP B STREPTOCOCCAL CARRIAGE COMPLICATING PREGNANCY: ICD-10-CM

## 2020-06-29 DIAGNOSIS — G25.81 RESTLESS LEG SYNDROME: ICD-10-CM

## 2020-06-29 DIAGNOSIS — O24.410 DIET CONTROLLED GESTATIONAL DIABETES MELLITUS (GDM) IN THIRD TRIMESTER: ICD-10-CM

## 2020-06-29 DIAGNOSIS — O99.012 ANEMIA IN PREGNANCY, SECOND TRIMESTER: ICD-10-CM

## 2020-06-29 PROBLEM — Z3A.38 38 WEEKS GESTATION OF PREGNANCY: Status: ACTIVE | Noted: 2020-06-29

## 2020-06-29 LAB
ACCELERATIONS > 10BPM: 4
ACCELERATIONS > 15 BPM: 7
ACOUSTIC STIMULATION: NO
DECELERATIONS: NORMAL
FHR VARIABILITIES: NORMAL
NST ASSESSMENT: REACTIVE
NST BASELINE: 120
NST DURATION: 15 MINUTES
NST INDICATIONS: NORMAL
NST LOCATIONS: NORMAL
NST READ BY: NORMAL
NST RETURN: NORMAL
UTERINE ACTIVITY: NO

## 2020-06-29 PROCEDURE — 99213 OFFICE O/P EST LOW 20 MIN: CPT | Performed by: OBSTETRICS & GYNECOLOGY

## 2020-06-29 PROCEDURE — 59025 FETAL NON-STRESS TEST: CPT | Performed by: OBSTETRICS & GYNECOLOGY

## 2020-06-29 NOTE — PROGRESS NOTES
Chief Complaint   Patient presents with   • Routine Prenatal Visit     ob visit with ultrasound       HPI: Tasha is a  currently at 38w0d who today reports the following:Headache - No ; Visual change - No ; Swelling in legs - No ; Nausea - No ; Constipation - No; Diarrhea - No ; Contractions - YES ; Leaking fluid - No ; Vaginal bleeding -  No.      ROS: Constitutional: negative for fever, chills, activity change, appetite change, fatigue and unexpected weight change.  Respiratory: negative for cough and dyspnea on exertion  Vitals: See prenatal flowsheet     EXAM:  Abdomen: See prenatal flowsheet   Urine glucose/protein: See prenatal flowsheet   Pelvic: See prenatal flowsheet     Prenatal Labs  Lab Results   Component Value Date    HGB 11.1 (L) 2020    RUBELLAIGGIN Immune 2014    RUBELLAABIGG 28.00 2020    HEPBSAG Negative 2020    LABRPR Non-reactive 2014    ABORH O Rh Positive 2014    ABO O 2020    RH Positive 2020    ABSCRN Negative 2020    LABANTI Negative 2014    RJBHMRL24 NonReactive 2014    OIX7DXA4 Non Reactive 2020    HEPCVIRUSABY <0.1 2020    ZTPDBWZ4BZ 165 (H) 2014    STREPGPB POSITIVE 2020    URINECX Final report 2020       MDM:High Risk Pregnancy    Impression:Multiparous patient with medical complications and GDMA1  Tests done today: NST  and U/S for AUDRA only   Specific topics discussed at today's visit: Questions answered regarding COVID-19 and revision of office schedule of visits due to pandemic  Birth plan  Labor signs and symptoms  Symptoms of preeclampsia  We discussed the fundamental importance of the monitoring of fetal movment and I encouraged her to report to hops at any concern about fetal movement which doesnt repond promptly to hydration and rest. We discussed  testing and induction of labor  Next visit:NST

## 2020-07-06 ENCOUNTER — TELEPHONE (OUTPATIENT)
Dept: OBSTETRICS AND GYNECOLOGY | Facility: CLINIC | Age: 35
End: 2020-07-06

## 2020-07-06 NOTE — TELEPHONE ENCOUNTER
She needs to be seen weekly.  If she does not have an appointment already set up this week, needs to be seen ASAP.  We should get her set up for a biophysical profile when she comes in for that visit

## 2020-07-07 ENCOUNTER — ROUTINE PRENATAL (OUTPATIENT)
Dept: OBSTETRICS AND GYNECOLOGY | Facility: CLINIC | Age: 35
End: 2020-07-07

## 2020-07-07 VITALS — SYSTOLIC BLOOD PRESSURE: 188 MMHG | DIASTOLIC BLOOD PRESSURE: 72 MMHG | BODY MASS INDEX: 26.61 KG/M2 | WEIGHT: 155 LBS

## 2020-07-07 DIAGNOSIS — Z34.83 PRENATAL CARE, SUBSEQUENT PREGNANCY IN THIRD TRIMESTER: Primary | ICD-10-CM

## 2020-07-07 DIAGNOSIS — O24.410 DIET CONTROLLED GESTATIONAL DIABETES MELLITUS (GDM) IN THIRD TRIMESTER: ICD-10-CM

## 2020-07-07 PROBLEM — Z3A.39 39 WEEKS GESTATION OF PREGNANCY: Status: ACTIVE | Noted: 2020-06-29

## 2020-07-07 PROCEDURE — 59025 FETAL NON-STRESS TEST: CPT | Performed by: OBSTETRICS & GYNECOLOGY

## 2020-07-07 PROCEDURE — 99213 OFFICE O/P EST LOW 20 MIN: CPT | Performed by: OBSTETRICS & GYNECOLOGY

## 2020-07-07 NOTE — PROGRESS NOTES
Chief Complaint   Patient presents with   • Routine Prenatal Visit       HPI: Tasha is a  currently at 39w1d who today reports the following:  Contractions - YES - but less than 4/hour AND no associated change in vaginal discharge; Leaking - No; Vaginal bleeding -  No; Swelling of extremities - No.    ROS:  GI: Nausea - No; Constipation - No; Diarrhea - No    Neuro: Headache - No; Visual change - No    Respiratory: Cough - No; SOB - No; fever - No     EXAM:  Vitals: See prenatal flowsheet   Abdomen: See prenatal flowsheet   Urine glucose/protein: See prenatal flowsheet   Pelvic: See prenatal flowsheet   MDM:   Impression: 1. Supervision of high risk pregnancy  2. DM - GDMA1  3. GBS (+)  4. Anemia in pregnancy   Tests done today: 1. NST - reactive  2. U/S for AUDRA only - normal   Topics discussed: 1. Continue with PNV's  2. Prenatal labs reviewed  3. Questions answered regarding COVID-19 and revision of office schedule of visits due to pandemic  4. No additional counseling given - she has no specific complaints or concerns   Tests scheduled today for her next visit:   NST

## 2020-07-08 ENCOUNTER — HOSPITAL ENCOUNTER (INPATIENT)
Facility: HOSPITAL | Age: 35
LOS: 2 days | Discharge: HOME | End: 2020-07-10
Attending: OBSTETRICS & GYNECOLOGY | Admitting: OBSTETRICS & GYNECOLOGY

## 2020-07-08 PROBLEM — G25.81 RESTLESS LEG SYNDROME: Status: RESOLVED | Noted: 2020-04-23 | Resolved: 2020-07-08

## 2020-07-08 PROBLEM — O24.410 DIET CONTROLLED GESTATIONAL DIABETES MELLITUS (GDM) IN THIRD TRIMESTER: Status: RESOLVED | Noted: 2019-12-27 | Resolved: 2020-07-08

## 2020-07-08 PROBLEM — Z37.9 NORMAL LABOR: Status: RESOLVED | Noted: 2020-07-08 | Resolved: 2020-07-08

## 2020-07-08 PROBLEM — O99.012 ANEMIA IN PREGNANCY, SECOND TRIMESTER: Status: RESOLVED | Noted: 2020-04-24 | Resolved: 2020-07-08

## 2020-07-08 PROBLEM — Z3A.39 39 WEEKS GESTATION OF PREGNANCY: Status: RESOLVED | Noted: 2020-06-29 | Resolved: 2020-07-08

## 2020-07-08 PROBLEM — Z34.80 PRENATAL CARE, SUBSEQUENT PREGNANCY: Status: RESOLVED | Noted: 2019-12-27 | Resolved: 2020-07-08

## 2020-07-08 PROBLEM — O99.820 GROUP B STREPTOCOCCAL CARRIAGE COMPLICATING PREGNANCY: Status: RESOLVED | Noted: 2020-06-29 | Resolved: 2020-07-08

## 2020-07-08 PROBLEM — Z37.9 NORMAL LABOR: Status: ACTIVE | Noted: 2020-07-08

## 2020-07-08 LAB
ABO GROUP BLD: NORMAL
ALP SERPL-CCNC: 263 U/L (ref 39–117)
ALT SERPL W P-5'-P-CCNC: 15 U/L (ref 1–33)
AST SERPL-CCNC: 19 U/L (ref 1–32)
BILIRUB SERPL-MCNC: 0.3 MG/DL (ref 0–1.2)
BLD GP AB SCN SERPL QL: NEGATIVE
CREAT SERPL-MCNC: 0.73 MG/DL (ref 0.57–1)
DEPRECATED RDW RBC AUTO: 48.8 FL (ref 37–54)
ERYTHROCYTE [DISTWIDTH] IN BLOOD BY AUTOMATED COUNT: 14.3 % (ref 12.3–15.4)
HCT VFR BLD AUTO: 39.1 % (ref 34–46.6)
HGB BLD-MCNC: 12.5 G/DL (ref 12–15.9)
LDH SERPL-CCNC: 204 U/L (ref 135–214)
MCH RBC QN AUTO: 30.1 PG (ref 26.6–33)
MCHC RBC AUTO-ENTMCNC: 32 G/DL (ref 31.5–35.7)
MCV RBC AUTO: 94.2 FL (ref 79–97)
PLATELET # BLD AUTO: 150 10*3/MM3 (ref 140–450)
PMV BLD AUTO: 10.1 FL (ref 6–12)
RBC # BLD AUTO: 4.15 10*6/MM3 (ref 3.77–5.28)
RH BLD: POSITIVE
T&S EXPIRATION DATE: NORMAL
URATE SERPL-MCNC: 5.7 MG/DL (ref 2.4–5.7)
WBC # BLD AUTO: 6.99 10*3/MM3 (ref 3.4–10.8)

## 2020-07-08 PROCEDURE — 25010000002 BUTORPHANOL PER 1 MG: Performed by: OBSTETRICS & GYNECOLOGY

## 2020-07-08 PROCEDURE — 84460 ALANINE AMINO (ALT) (SGPT): CPT | Performed by: OBSTETRICS & GYNECOLOGY

## 2020-07-08 PROCEDURE — 84550 ASSAY OF BLOOD/URIC ACID: CPT | Performed by: OBSTETRICS & GYNECOLOGY

## 2020-07-08 PROCEDURE — 86900 BLOOD TYPING SEROLOGIC ABO: CPT | Performed by: OBSTETRICS & GYNECOLOGY

## 2020-07-08 PROCEDURE — 84450 TRANSFERASE (AST) (SGOT): CPT | Performed by: OBSTETRICS & GYNECOLOGY

## 2020-07-08 PROCEDURE — 86901 BLOOD TYPING SEROLOGIC RH(D): CPT | Performed by: OBSTETRICS & GYNECOLOGY

## 2020-07-08 PROCEDURE — 84075 ASSAY ALKALINE PHOSPHATASE: CPT | Performed by: OBSTETRICS & GYNECOLOGY

## 2020-07-08 PROCEDURE — 83615 LACTATE (LD) (LDH) ENZYME: CPT | Performed by: OBSTETRICS & GYNECOLOGY

## 2020-07-08 PROCEDURE — 82247 BILIRUBIN TOTAL: CPT | Performed by: OBSTETRICS & GYNECOLOGY

## 2020-07-08 PROCEDURE — 59410 OBSTETRICAL CARE: CPT | Performed by: OBSTETRICS & GYNECOLOGY

## 2020-07-08 PROCEDURE — 25010000003 CEFAZOLIN IN DEXTROSE 2-4 GM/100ML-% SOLUTION: Performed by: OBSTETRICS & GYNECOLOGY

## 2020-07-08 PROCEDURE — 82565 ASSAY OF CREATININE: CPT | Performed by: OBSTETRICS & GYNECOLOGY

## 2020-07-08 PROCEDURE — 85027 COMPLETE CBC AUTOMATED: CPT | Performed by: OBSTETRICS & GYNECOLOGY

## 2020-07-08 PROCEDURE — 0KQM0ZZ REPAIR PERINEUM MUSCLE, OPEN APPROACH: ICD-10-PCS | Performed by: OBSTETRICS & GYNECOLOGY

## 2020-07-08 PROCEDURE — 86850 RBC ANTIBODY SCREEN: CPT | Performed by: OBSTETRICS & GYNECOLOGY

## 2020-07-08 PROCEDURE — 25010000002 METHYLERGONOVINE MALEATE PER 0.2 MG: Performed by: OBSTETRICS & GYNECOLOGY

## 2020-07-08 RX ORDER — ZOLPIDEM TARTRATE 5 MG/1
5 TABLET ORAL NIGHTLY PRN
Status: CANCELLED | OUTPATIENT
Start: 2020-07-08 | End: 2020-07-18

## 2020-07-08 RX ORDER — MISOPROSTOL 200 UG/1
800 TABLET ORAL AS NEEDED
Status: DISCONTINUED | OUTPATIENT
Start: 2020-07-08 | End: 2020-07-08

## 2020-07-08 RX ORDER — OXYTOCIN-SODIUM CHLORIDE 0.9% IV SOLN 30 UNIT/500ML 30-0.9/5 UT/ML-%
650 SOLUTION INTRAVENOUS ONCE
Status: COMPLETED | OUTPATIENT
Start: 2020-07-08 | End: 2020-07-08

## 2020-07-08 RX ORDER — OXYTOCIN-SODIUM CHLORIDE 0.9% IV SOLN 30 UNIT/500ML 30-0.9/5 UT/ML-%
85 SOLUTION INTRAVENOUS ONCE
Status: COMPLETED | OUTPATIENT
Start: 2020-07-08 | End: 2020-07-08

## 2020-07-08 RX ORDER — CEFAZOLIN SODIUM 1 G/50ML
1 INJECTION, SOLUTION INTRAVENOUS ONCE
Status: DISCONTINUED | OUTPATIENT
Start: 2020-07-08 | End: 2020-07-08

## 2020-07-08 RX ORDER — SODIUM CHLORIDE 0.9 % (FLUSH) 0.9 %
1-10 SYRINGE (ML) INJECTION AS NEEDED
Status: CANCELLED | OUTPATIENT
Start: 2020-07-08

## 2020-07-08 RX ORDER — BISACODYL 10 MG
10 SUPPOSITORY, RECTAL RECTAL DAILY PRN
Status: DISCONTINUED | OUTPATIENT
Start: 2020-07-09 | End: 2020-07-10 | Stop reason: HOSPADM

## 2020-07-08 RX ORDER — DOCUSATE SODIUM 100 MG/1
100 CAPSULE, LIQUID FILLED ORAL 2 TIMES DAILY
Status: CANCELLED | OUTPATIENT
Start: 2020-07-08

## 2020-07-08 RX ORDER — ACETAMINOPHEN 325 MG/1
650 TABLET ORAL EVERY 4 HOURS PRN
Status: DISCONTINUED | OUTPATIENT
Start: 2020-07-08 | End: 2020-07-08

## 2020-07-08 RX ORDER — LANOLIN
CREAM (ML) TOPICAL
Status: CANCELLED | OUTPATIENT
Start: 2020-07-08

## 2020-07-08 RX ORDER — ONDANSETRON 2 MG/ML
4 INJECTION INTRAMUSCULAR; INTRAVENOUS EVERY 6 HOURS PRN
Status: DISCONTINUED | OUTPATIENT
Start: 2020-07-08 | End: 2020-07-08

## 2020-07-08 RX ORDER — BUTORPHANOL TARTRATE 1 MG/ML
1 INJECTION, SOLUTION INTRAMUSCULAR; INTRAVENOUS
Status: DISCONTINUED | OUTPATIENT
Start: 2020-07-08 | End: 2020-07-08

## 2020-07-08 RX ORDER — CEFAZOLIN SODIUM 2 G/100ML
2 INJECTION, SOLUTION INTRAVENOUS ONCE
Status: COMPLETED | OUTPATIENT
Start: 2020-07-08 | End: 2020-07-08

## 2020-07-08 RX ORDER — LIDOCAINE HYDROCHLORIDE 10 MG/ML
5 INJECTION, SOLUTION EPIDURAL; INFILTRATION; INTRACAUDAL; PERINEURAL AS NEEDED
Status: DISCONTINUED | OUTPATIENT
Start: 2020-07-08 | End: 2020-07-08

## 2020-07-08 RX ORDER — DOCUSATE SODIUM 100 MG/1
100 CAPSULE, LIQUID FILLED ORAL 2 TIMES DAILY
Status: DISCONTINUED | OUTPATIENT
Start: 2020-07-08 | End: 2020-07-10 | Stop reason: HOSPADM

## 2020-07-08 RX ORDER — HYDROCORTISONE 25 MG/G
1 CREAM TOPICAL AS NEEDED
Status: CANCELLED | OUTPATIENT
Start: 2020-07-08

## 2020-07-08 RX ORDER — ACETAMINOPHEN 500 MG
1000 TABLET ORAL EVERY 4 HOURS PRN
Status: CANCELLED | OUTPATIENT
Start: 2020-07-08

## 2020-07-08 RX ORDER — LANOLIN
CREAM (ML) TOPICAL
Status: DISCONTINUED | OUTPATIENT
Start: 2020-07-08 | End: 2020-07-10 | Stop reason: HOSPADM

## 2020-07-08 RX ORDER — MAGNESIUM CARB/ALUMINUM HYDROX 105-160MG
30 TABLET,CHEWABLE ORAL ONCE
Status: DISCONTINUED | OUTPATIENT
Start: 2020-07-08 | End: 2020-07-08

## 2020-07-08 RX ORDER — ONDANSETRON 4 MG/1
4 TABLET, FILM COATED ORAL EVERY 6 HOURS PRN
Status: DISCONTINUED | OUTPATIENT
Start: 2020-07-08 | End: 2020-07-08

## 2020-07-08 RX ORDER — SODIUM CHLORIDE 0.9 % (FLUSH) 0.9 %
3 SYRINGE (ML) INJECTION EVERY 12 HOURS SCHEDULED
Status: DISCONTINUED | OUTPATIENT
Start: 2020-07-08 | End: 2020-07-08

## 2020-07-08 RX ORDER — BISACODYL 10 MG
10 SUPPOSITORY, RECTAL RECTAL DAILY PRN
Status: CANCELLED | OUTPATIENT
Start: 2020-07-09

## 2020-07-08 RX ORDER — SODIUM CHLORIDE, SODIUM LACTATE, POTASSIUM CHLORIDE, CALCIUM CHLORIDE 600; 310; 30; 20 MG/100ML; MG/100ML; MG/100ML; MG/100ML
125 INJECTION, SOLUTION INTRAVENOUS CONTINUOUS
Status: DISCONTINUED | OUTPATIENT
Start: 2020-07-08 | End: 2020-07-08

## 2020-07-08 RX ORDER — SODIUM CHLORIDE 0.9 % (FLUSH) 0.9 %
10 SYRINGE (ML) INJECTION AS NEEDED
Status: DISCONTINUED | OUTPATIENT
Start: 2020-07-08 | End: 2020-07-08

## 2020-07-08 RX ORDER — CARBOPROST TROMETHAMINE 250 UG/ML
250 INJECTION, SOLUTION INTRAMUSCULAR AS NEEDED
Status: DISCONTINUED | OUTPATIENT
Start: 2020-07-08 | End: 2020-07-08

## 2020-07-08 RX ORDER — HYDROCORTISONE 25 MG/G
1 CREAM TOPICAL AS NEEDED
Status: DISCONTINUED | OUTPATIENT
Start: 2020-07-08 | End: 2020-07-10 | Stop reason: HOSPADM

## 2020-07-08 RX ORDER — IBUPROFEN 600 MG/1
600 TABLET ORAL EVERY 6 HOURS PRN
Status: CANCELLED | OUTPATIENT
Start: 2020-07-08

## 2020-07-08 RX ORDER — IBUPROFEN 600 MG/1
600 TABLET ORAL EVERY 6 HOURS PRN
Status: DISCONTINUED | OUTPATIENT
Start: 2020-07-08 | End: 2020-07-10 | Stop reason: HOSPADM

## 2020-07-08 RX ORDER — METHYLERGONOVINE MALEATE 0.2 MG/ML
200 INJECTION INTRAVENOUS ONCE AS NEEDED
Status: COMPLETED | OUTPATIENT
Start: 2020-07-08 | End: 2020-07-08

## 2020-07-08 RX ADMIN — BUTORPHANOL TARTRATE 2 MG: 2 INJECTION, SOLUTION INTRAMUSCULAR; INTRAVENOUS at 04:48

## 2020-07-08 RX ADMIN — DOCUSATE SODIUM 100 MG: 100 CAPSULE, LIQUID FILLED ORAL at 20:20

## 2020-07-08 RX ADMIN — OXYTOCIN 650 ML/HR: 10 INJECTION, SOLUTION INTRAMUSCULAR; INTRAVENOUS at 05:12

## 2020-07-08 RX ADMIN — IBUPROFEN 600 MG: 600 TABLET, FILM COATED ORAL at 09:40

## 2020-07-08 RX ADMIN — METHYLERGONOVINE MALEATE 200 MCG: 0.2 INJECTION, SOLUTION INTRAMUSCULAR; INTRAVENOUS at 06:07

## 2020-07-08 RX ADMIN — OXYTOCIN 85 ML/HR: 10 INJECTION, SOLUTION INTRAMUSCULAR; INTRAVENOUS at 06:07

## 2020-07-08 RX ADMIN — DOCUSATE SODIUM 100 MG: 100 CAPSULE, LIQUID FILLED ORAL at 09:40

## 2020-07-08 RX ADMIN — IBUPROFEN 600 MG: 600 TABLET, FILM COATED ORAL at 18:35

## 2020-07-08 RX ADMIN — WITCH HAZEL 1 PAD: 500 SOLUTION RECTAL; TOPICAL at 09:41

## 2020-07-08 RX ADMIN — Medication 2 APPLICATION: at 09:40

## 2020-07-08 RX ADMIN — CEFAZOLIN SODIUM 2 G: 2 INJECTION, SOLUTION INTRAVENOUS at 05:00

## 2020-07-08 RX ADMIN — Medication: at 09:41

## 2020-07-08 RX ADMIN — SODIUM CHLORIDE, POTASSIUM CHLORIDE, SODIUM LACTATE AND CALCIUM CHLORIDE 125 ML/HR: 600; 310; 30; 20 INJECTION, SOLUTION INTRAVENOUS at 04:30

## 2020-07-08 NOTE — L&D DELIVERY NOTE
[facility]  Vaginal Delivery Note    Delivery     Delivery: Vaginal, Spontaneous     YOB: 2019    Time of Birth:  Gestational Age 5:06 AM   39w2d     Anesthesia: None     Delivering clinician: J Carlos Partida    Forceps?   No   Vacuum? No    Shoulder dystocia present: No        Delivery narrative:  Pt spont delivered a male infant.  Cord clamping delayed by 90 seconds.  Uterus explored with no palpable fragments.  Second degree repaired in usual fashion.    Infant    Findings: male  infant     Infant observations: Weight: 3660 g (8 lb 1.1 oz)   Length: 20  in  Observations/Comments:         Apgars:    @ 1 minute /       @ 5 minutes   Infant Name:      Placenta, Cord, and Fluid    Placenta delivered     at   7/8/2020  5:12 AM     Cord: Unknown  present.   Nuchal Cord?  no   Cord blood obtained: Yes    Cord gases obtained:  No    Cord gas results: Venous:  No results found for: PHCVEN    Arterial:  No results found for: PHCART     Repair    Episiotomy: Not recorded     no    Lacerations: Yes  Laceration Information  Laceration Repaired?   Perineal:         Periurethral:         Labial:         Sulcus:         Vaginal:         Cervical:           Suture used for repair: 2-0 vicryl rapide   Estimated Blood Loss:             Complications  none    Disposition  Mother to Mother Baby/Postpartum  in stable condition currently.  Baby to remains with mom  in stable condition currently.      J Carlos Partida MD  07/08/20  05:29

## 2020-07-08 NOTE — H&P
"Tasha Mckinney  1985  [id number]  [id number]    CC: contractions  HPI:  Patient is 35 y.o. female   currently at 39w2d presents with c/o uterine contractions with SROM.  Good FM.  Small amt bloody show.  PNC comp by gest DM (diet controlled).     PMH:  Current meds: PNV, Fe  Illnesses: none  Surgeries: none  Allergies: NKDA    Past OB History:       OB History    Para Term  AB Living   6 3 3 0 2 3   SAB TAB Ectopic Molar Multiple Live Births   2 0 0 0 0 3      # Outcome Date GA Lbr [place]/2nd Weight Sex Delivery Anes PTL Lv   6 Current            5 Term 17 38w0d 03:15 / 00:27 2740 g (6 lb 0.7 oz) F Vag-Spont Local N LISA      Name: Cornelia      Apgar1: 8  Apgar5: 9   4 SAB / 6w0d          3 Term 14 39w0d  3118 g (6 lb 14 oz) M Vag-Spont None N LISA      Name: Maxim   2 SAB 13 6w0d          1 Term 12/13/10 38w0d  3090 g (6 lb 13 oz) M Vag-Spont None N LISA      Obstetric Comments    - Mario    - Maxim   2017 - [facility]            SH: tob neg , EtOH neg, drugs neg      General ROS: contractions.   All other systems reviewed and are negative.      Physical Examination: General appearance - alert, well appearing, and in no distress  Vital signs - Ht 162.6 cm (64\")   Wt 70.3 kg (155 lb)   LMP 10/07/2019 (Approximate)   Breastfeeding Yes   BMI 26.61 kg/m²   HEENT: normocephalic, atraumatic,oropharynx clear, appearance of ears and nose normal  Neck - supple, no significant adenopathy, no thyromegaly  Lymphatics - no palpable lymphadenopathy in the neck or groin, no hepatosplenomegaly  Chest - clear to auscultation, no wheezes, rales or rhonchi, respiratory effort non-labored  Heart - normal rate, regular rhythm, no murmurs, rubs, clicks or gallops, no JVD, no lower extremity edema  Abdomen - soft, nontender, nondistended, no masses, no hepatosplenomegaly  no rebound tenderness noted, bowel sounds normal  Vaginal Exam: 9-10/100%/-1 ,external genitalia " "normal  Extremities - no pedal edema noted, no calf tend  Skin -warm and dry, normal coloration and turgor, no rashes, no suspicious skin lesions noted      Fetal monitoring: indication contractions , onset 0420 , offset 0444 , baseline 125 , mod BTB variability , multiple accels (15 X 15), no decels, q2-4\" contractions, interpretation reactive NST    Radiology     Assessment 1)IUP 39 2/7 weeks   2)labor   3)GBS pos    Plan 1)admit   2)ancef 2g now   3)antic delivery soon    J Carlos Partida MD  7/8/2020  04:43                                                                    "

## 2020-07-08 NOTE — NURSING NOTE
Pt. Ambulates steadily to bathroom, pericare instructed with good return demonstration.  Pt. Ambulates steadily back to chair and transferred in stable condition to mother baby.

## 2020-07-09 ENCOUNTER — TELEPHONE (OUTPATIENT)
Dept: OBSTETRICS AND GYNECOLOGY | Facility: CLINIC | Age: 35
End: 2020-07-09

## 2020-07-09 LAB
HCT VFR BLD AUTO: 29.9 % (ref 34–46.6)
HGB BLD-MCNC: 9.6 G/DL (ref 12–15.9)

## 2020-07-09 PROCEDURE — 85018 HEMOGLOBIN: CPT | Performed by: OBSTETRICS & GYNECOLOGY

## 2020-07-09 PROCEDURE — 85014 HEMATOCRIT: CPT | Performed by: OBSTETRICS & GYNECOLOGY

## 2020-07-09 RX ORDER — FAMOTIDINE 20 MG/1
20 TABLET, FILM COATED ORAL 2 TIMES DAILY
Status: DISCONTINUED | OUTPATIENT
Start: 2020-07-09 | End: 2020-07-09

## 2020-07-09 RX ORDER — FAMOTIDINE 20 MG/1
20 TABLET, FILM COATED ORAL 2 TIMES DAILY
Status: DISCONTINUED | OUTPATIENT
Start: 2020-07-09 | End: 2020-07-10 | Stop reason: HOSPADM

## 2020-07-09 RX ORDER — ACETAMINOPHEN 500 MG
1000 TABLET ORAL EVERY 6 HOURS PRN
Status: DISCONTINUED | OUTPATIENT
Start: 2020-07-09 | End: 2020-07-10 | Stop reason: HOSPADM

## 2020-07-09 RX ADMIN — DOCUSATE SODIUM 100 MG: 100 CAPSULE, LIQUID FILLED ORAL at 19:58

## 2020-07-09 RX ADMIN — DOCUSATE SODIUM 100 MG: 100 CAPSULE, LIQUID FILLED ORAL at 09:31

## 2020-07-09 RX ADMIN — ACETAMINOPHEN 1000 MG: 500 TABLET, FILM COATED ORAL at 00:12

## 2020-07-09 RX ADMIN — IBUPROFEN 600 MG: 600 TABLET, FILM COATED ORAL at 12:27

## 2020-07-09 RX ADMIN — FAMOTIDINE 20 MG: 20 TABLET, FILM COATED ORAL at 19:58

## 2020-07-09 NOTE — PROGRESS NOTES
ZENAIDA Mckinney  : 1985  MRN: [id number]  CSN: [id number]    Hospital Day: 2    Postpartum Day #1  Subjective     CC: hospital follow-up    Her pain is well controlled.  Vaginal bleeding is less than a normal period.  Is complaining of some heartburn     Objective     Min/max vitals past 24 hours:   Temp  Min: 98.1 °F (36.7 °C)  Max: 98.7 °F (37.1 °C)  BP  Min: 109/62  Max: 118/56  Pulse  Min: 73  Max: 89  Resp  Min: 16  Max: 18        Abdomen: soft, non-tender; bowel sounds normal; no masses   fundus firm and non-tender   Pelvic: deferred     Results from last 7 days   Lab Units 20  0805 20  0443   WBC 10*3/mm3  --  6.99   HEMOGLOBIN g/dL 9.6* 12.5   HEMATOCRIT % 29.9* 39.1   PLATELETS 10*3/mm3  --  150     Lab Results   Component Value Date    ABORH O Rh Positive 2014    RH Positive 2020    HEPBSAG Negative 2020        Assessment   1. Postpartum Day #1 S/P vaginal delivery  2. Doing well     Plan   1. Continue routine postpartum care  2. Add PPI    Paolo Brooks MD  2020  12:48

## 2020-07-09 NOTE — SIGNIFICANT NOTE
"Pt c/o fluttering of heart. Pt wore holter monitor during pregnancy for tachycardia and results \"normal.\" Vitals within range and Hgb and Hct within normal range. Laborist and Charge nurse aware.   "

## 2020-07-10 VITALS
HEIGHT: 64 IN | BODY MASS INDEX: 26.46 KG/M2 | RESPIRATION RATE: 16 BRPM | DIASTOLIC BLOOD PRESSURE: 69 MMHG | HEART RATE: 105 BPM | SYSTOLIC BLOOD PRESSURE: 117 MMHG | WEIGHT: 155 LBS | TEMPERATURE: 98.2 F

## 2020-07-10 RX ADMIN — IBUPROFEN 600 MG: 600 TABLET, FILM COATED ORAL at 00:38

## 2020-07-10 RX ADMIN — DOCUSATE SODIUM 100 MG: 100 CAPSULE, LIQUID FILLED ORAL at 08:10

## 2020-07-10 RX ADMIN — IBUPROFEN 600 MG: 600 TABLET, FILM COATED ORAL at 12:41

## 2020-07-10 RX ADMIN — FAMOTIDINE 20 MG: 20 TABLET, FILM COATED ORAL at 08:10

## 2020-07-10 NOTE — PAYOR COMM NOTE
"Rnada Mckinney (35 y.o. Female)   Auth#[id number]  Discharge date     Date of Birth Social Security Number Address Home Phone MRN    1985  [address] [place] [address]  [id number]    [employer] Marital Status          None        Admission Date Admission Type Admitting Provider Attending Provider Department, Room/Bed    20 Elective Paolo rBooks MD  [facility] MOTHER BABY 4B, N437/1    Discharge Date Discharge Disposition Discharge Destination        7/10/2020 Home or Self Care              Attending Provider:  (none)   Allergies:  No Known Drug Allergy    Isolation:  None   Infection:  None   Code Status:  CPR    Ht:  162.6 cm (64\")   Wt:  70.3 kg (155 lb)    Admission Cmt:  None   Principal Problem:  Postpartum care following  2020 - Preet [Z39.2]                 Active Insurance as of 2020     Primary Coverage     Payor Plan Insurance Group Employer/Plan Group    [place] [id number]     Payor Plan Address Payor Plan Phone Number Payor Plan Fax Number Effective Dates    [employer] Claims Office - [address] [address] [phone]  2020 - None Entered    [facility] [id number]       Subscriber Name Subscriber Birth Date Member ID       RANDA MCKINNEY 1985 [id number]                 Emergency Contacts      (Rel.) Home Phone Work Phone Mobile Phone    Jarred Mckinney (Spouse) [phone] -- --                 Discharge Summary      Paolo Brooks MD at 07/10/20 0645          Discharge Summary    [facility]   Randa Mckinney  : 1985  MRN: [id number]  CSN: [id number]    Date of Admission: 2020   Date of Discharge:  7/10/2020   Delivering Physician: J Carlos Partida        Admission Diagnosis: 1. Normal labor [O80, Z37.9]   Discharge Diagnosis: 1. Same as above plus  2. Pregnancy at 39w2d - delivered       Procedures: 2020  - Vaginal, Spontaneous       Hospital " Course  Patient is a 35 y.o.  who at 39w2d had a vaginal birth.  Her postpartum course was without complications.  On PPD #2 she was ready for discharge.  She had normal lochia and pain well controlled with oral medications.    Infant  male  fetus weighing 3660 g (8 lb 1.1 oz)   Apgars -  7  @ 1 minute /  8  @ 5 minutes.    Discharge labs  Lab Results   Component Value Date    WBC 6.99 2020    HGB 9.6 (L) 2020    HCT 29.9 (L) 2020     2020       Discharge Medications     Discharge Medications      Continue These Medications      Instructions Start Date   Ferralet 90 90-1 MG tablet   1 po qd      omeprazole 20 MG capsule  Commonly known as:  priLOSEC   20 mg, Oral, Daily      Prenatal 19 29-1 MG tablet   Oral, Daily      VITAMIN D PO   Oral         Stop These Medications    glucose blood test strip  Commonly known as:  FREESTYLE LITE            Discharge Disposition Home or Self Care   Condition on Discharge: good   Follow-up: 6 weeks with Dr. Todd Brooks MD  7/10/2020    Electronically signed by Paolo Brooks MD at 07/10/20 7379

## 2020-07-10 NOTE — DISCHARGE SUMMARY
Discharge Summary     Carlton Mckinney  : 1985  MRN: [id number]  CSN: [id number]    Date of Admission: 2020   Date of Discharge:  7/10/2020   Delivering Physician: J Carlos Partida        Admission Diagnosis: 1. Normal labor [O80, Z37.9]   Discharge Diagnosis: 1. Same as above plus  2. Pregnancy at 39w2d - delivered       Procedures: 2020  - Vaginal, Spontaneous       Hospital Course  Patient is a 35 y.o.  who at 39w2d had a vaginal birth.  Her postpartum course was without complications.  On PPD #2 she was ready for discharge.  She had normal lochia and pain well controlled with oral medications.    Infant  male  fetus weighing 3660 g (8 lb 1.1 oz)   Apgars -  7  @ 1 minute /  8  @ 5 minutes.    Discharge labs  Lab Results   Component Value Date    WBC 6.99 2020    HGB 9.6 (L) 2020    HCT 29.9 (L) 2020     2020       Discharge Medications     Discharge Medications      Continue These Medications      Instructions Start Date   Ferralet 90 90-1 MG tablet   1 po qd      omeprazole 20 MG capsule  Commonly known as:  priLOSEC   20 mg, Oral, Daily      Prenatal 19 29-1 MG tablet   Oral, Daily      VITAMIN D PO   Oral         Stop These Medications    glucose blood test strip  Commonly known as:  FREESTYLE LITE            Discharge Disposition Home or Self Care   Condition on Discharge: good   Follow-up: 6 weeks with Dr. Todd Brooks MD  7/10/2020

## 2020-07-10 NOTE — PROGRESS NOTES
ZENAIDA Mckinney  : 1985  MRN: [id number]  CSN: [id number]    Hospital Day: 3    Postpartum Day #2  Subjective     CC: hospital follow-up    Her pain is well controlled.  Vaginal bleeding is less than a normal period.      Objective     Min/max vitals past 24 hours:   Temp  Min: 98.2 °F (36.8 °C)  Max: 98.6 °F (37 °C)  BP  Min: 109/62  Max: 128/70  Pulse  Min: 86  Max: 94  Resp  Min: 16  Max: 18        General: well developed; well nourished  no acute distress   Abdomen: fundus firm and non-tender   Pelvic: Not performed   Ext: Calves NT     Results from last 7 days   Lab Units 20  0805 20  0443   WBC 10*3/mm3  --  6.99   HEMOGLOBIN g/dL 9.6* 12.5   HEMATOCRIT % 29.9* 39.1   PLATELETS 10*3/mm3  --  150     Lab Results   Component Value Date    ABORH O Rh Positive 2014    RH Positive 2020    HEPBSAG Negative 2020        Assessment   1. Postpartum Day #2 S/P vaginal delivery  2. Doing well     Plan   1. Discharge to home  2. Advised no tampons or intercourse for 6 weeks.  3. D/C questions all answered  4. Follow-up appointment in 6 week(s)    Paolo Brooks MD  7/10/2020  06:43

## 2020-08-21 DIAGNOSIS — R10.31 RLQ ABDOMINAL PAIN: ICD-10-CM

## 2020-08-21 DIAGNOSIS — R10.32 LLQ ABDOMINAL PAIN: Primary | ICD-10-CM

## 2020-08-22 NOTE — PROGRESS NOTES
Subjective   Chief Complaint   Patient presents with   • Postpartum Care     Tasha Mckinney is a 35 y.o. year old  presenting to be seen for her postpartum visit.  She had a vaginal delivery.  Her son is doing well.    Since she has been home she has been having issues with pelvic pain and discomfort.  It hard to walk.  It hurts mostly in the left lower quadrant.  Is not getting better.  For this reason she got a hold of me last week and an ultrasound was ordered in advance of today's appointment.    Since delivery she has not been sexually active.  She does not have concerns about post-partum blues/depression.   [place] Score = 1  She is breast feeding and plans to continues for 6 month(s).  For ongoing contraception, her plans are to use none.    The following portions of the patient's history were reviewed and updated as appropriate:current medications and allergies    Social History    Tobacco Use      Smoking status: Never Smoker      Smokeless tobacco: Never Used      Review of Systems  Constitutional POS: nothing reported    NEG: anorexia or night sweats   Genitourinary POS: see HPI    NEG: dysuria or hematuria      Gastointestinal POS: see HPI    NEG: bloating, change in bowel habits, melena or reflux symptoms   Breast POS: nothing reported    NEG: persistent breast lump, skin dimpling or nipple discharge        Objective   /68   Resp 14   Wt 61.7 kg (136 lb)   LMP 10/07/2019 (Approximate)   Breastfeeding Yes   BMI 23.34 kg/m²     General:  well developed; well nourished  no acute distress   Abdomen: soft, non-tender; no masses  no umbilical or inguinal hernias are present  no hepato-splenomegaly   Pelvis: Clinical staff was present for exam  External genitalia:  normal appearance of the external genitalia including Bartholin's and [place]'s glands.  :  urethral meatus normal;  Vaginal:  normal pink mucosa without prolapse or lesions.  Cervix:  normal appearance.  Uterus:   normal size, shape and consistency.  Adnexa:  normal bimanual exam of the adnexa.  Rectal:  digital rectal exam not performed; anus visually normal appearing.     Pelvic ultrasound - normal       Assessment   1. Normal 6 week postpartum exam S/P vaginal delivery   2. LLQ pain - history highly suggestive of symphysis dysfunction     Plan   1. Set up physical therapy evaluation for presumed pelvic floor physical therapy  2. Folic acid for the prevention of neural tube defects was discussed.  At least 0.4 mg should be taken preconceptionally to reduce the risk.  It was explained that this may reduce the baseline incidence of neural tube defect by as much as 65%.  Folic acid can be found in OTC multivitamins, OTC prenatal vitamins or breakfast cereal that that contains 100% of the RDA of folate.  3. The importance of keeping all planned follow-up and taking all medications as prescribed was emphasized.  4. Follow up for annual exam 3-4 months          This note was electronically signed.    Paolo Brooks M.D.  August 24, 2020    Note: Speech recognition transcription software may have been used to create portions of this document.  An attempt at proofreading has been made but errors in transcription could still be present.

## 2020-08-24 ENCOUNTER — POSTPARTUM VISIT (OUTPATIENT)
Dept: OBSTETRICS AND GYNECOLOGY | Facility: CLINIC | Age: 35
End: 2020-08-24

## 2020-08-24 VITALS
WEIGHT: 136 LBS | BODY MASS INDEX: 23.34 KG/M2 | SYSTOLIC BLOOD PRESSURE: 120 MMHG | RESPIRATION RATE: 14 BRPM | DIASTOLIC BLOOD PRESSURE: 68 MMHG

## 2020-08-24 DIAGNOSIS — S33.4XXA DISLOCATION OF SYMPHYSIS PUBIS, INITIAL ENCOUNTER: ICD-10-CM

## 2020-08-24 PROCEDURE — 0503F POSTPARTUM CARE VISIT: CPT | Performed by: OBSTETRICS & GYNECOLOGY

## 2020-09-10 ENCOUNTER — TREATMENT (OUTPATIENT)
Dept: PHYSICAL THERAPY | Facility: CLINIC | Age: 35
End: 2020-09-10

## 2020-09-10 DIAGNOSIS — S33.4XXA DISLOCATION OF SYMPHYSIS PUBIS, INITIAL ENCOUNTER: Primary | ICD-10-CM

## 2020-09-10 DIAGNOSIS — R10.2 PELVIC PAIN: ICD-10-CM

## 2020-09-10 DIAGNOSIS — R26.89 DECREASED FUNCTIONAL MOBILITY: ICD-10-CM

## 2020-09-10 DIAGNOSIS — M62.838 MUSCLE SPASM: ICD-10-CM

## 2020-09-10 DIAGNOSIS — M62.81 MUSCLE WEAKNESS: ICD-10-CM

## 2020-09-10 PROCEDURE — 97162 PT EVAL MOD COMPLEX 30 MIN: CPT | Performed by: PHYSICAL THERAPIST

## 2020-09-10 PROCEDURE — 97530 THERAPEUTIC ACTIVITIES: CPT | Performed by: PHYSICAL THERAPIST

## 2020-09-10 NOTE — PROGRESS NOTES
Physical Therapy Initial Evaluation and Plan of Care    Patient: Tasha Mckinney   : 1985  Diagnosis/ICD-10 Code:  Dislocation of symphysis pubis, initial encounter [S33.4XXA]  Referring practitioner: Paolo Brooks MD  Date of Initial Visit: 9/10/2020  Today's Date: 2020  Patient seen for 1 sessions         Subjective            Subjective Questionnaire: PGQ: 41.3%      Subjective Evaluation    History of Present Illness    Son delivered 2020. Came home after third day and didn't feel pain prior due to pain medication. Went to bathroom and couldn't lift her L leg, walk, roll in bed due to pain. Was fine with not moving. Getting dressed, any activities couldn't do without pain. Couldn't walk due to pain. 9/10 pain at that time. Her mother had to do everything around the house. Pain has gotten better. /10 now with same activities. L groin and pubic bone initially, now L groin and low abdomen. On bedrest for 2 months during this pregnancy and this is her 4th child. She had some mild similar issues after delivery of third child but they resolved quickly after delivery.     Pain  Average:Pelvic #: 3 Best: Pelvic #: 0 Worst: Pelvic #: 4  Location: L groin, low abdomen  Aggs: activity    Bladder function:  Denies issues      Bowel function:  Constipation after delivery but resolving.       Sexual activity  Sexually active: denies dysfunction      Menstrual cycle: not resumed    Birth HX:  - all vaginal deliveries - this one was largest 8 lbs 1 oz, quick delivery 15-20 minutes      Objective          Static Posture     Head  Forward.    Shoulders  Rounded.    Lumbar Spine   Increased lordosis.     Pelvis   Anterior pelvic tilt  Pelvis (Left): Elevated and obliquity.     Postural Observations  Seated posture: fair  Standing posture: fair  Correction of posture: has no consistent effect        Palpation   Left   No palpable tenderness to the erector spinae, external abdominal oblique,  iliacus, iliopsoas, quadratus lumborum and rectus abdominus.   Hypertonic in the adductor brevis, adductor longus, adductor ivette and transverse abdominus.   Tenderness of the transverse abdominus.     Right   No palpable tenderness to the adductor brevis, adductor longus, adductor ivette, erector spinae, external abdominal oblique, iliacus, iliopsoas, quadratus lumborum, rectus abdominus and transverse abdominus.     Additional Palpation Details  2/10 TTP L TrA; 6/10 TTP pubic symphysis; 4/10 TTP L inguinal crease    Tenderness     Left Hip   Tenderness in the inguinal ligament and pubic tubercle. No tenderness in the ASIS, PSIS and sacroiliac joint.     Right Hip   Tenderness in the pubic tubercle. No tenderness in the ASIS, PSIS and sacroiliac joint.     Neurological Testing     Sensation     Lumbar   Left   Intact: light touch    Right   Intact: light touch    Active Range of Motion     Lumbar   Normal active range of motion  Flexion: with pain  Left Hip   Flexion: with pain  Abduction: with pain    Right Hip   Normal active range of motion    Additional Active Range of Motion Details  25% decrease L hip ABD and FLEX with pain          Strength/Myotome Testing     Left Hip   Planes of Motion   Flexion: 4  Extension: 4  Abduction: 3+  Adduction: 3-  External rotation: 4-  Internal rotation: 4-    Right Hip   Planes of Motion   Flexion: 4  Extension: 4  Abduction: 4  Adduction: 4  External rotation: 4  Internal rotation: 4    Additional Strength Details        Muscle Activation     Additional Muscle Activation Details  Decreased activation of B TrA and mutlifidus    Tests       Thoracic   Negative slump.     Left Pelvic Girdle/Sacrum   Positive: gapping.   Negative: sacrum compression, sacral spring and thigh thrust.     Right Pelvic Girdle/Sacrum   Negative: sacrum compression, gapping, sacral spring and thigh thrust.     Left Hip   Positive SI distraction.   Negative EDGAR and scour.     Right Hip   Positive SI  distraction.   Negative EDGAR and scour.   SLR: Negative.     Additional Tests Details  Gapping positive for pubic symphysis pain  Positive springing at pubic symphysis 6/10     Functional ASLR increased lordosis with load transfer and increased pain/difficulty lifting LLE - improved with correction of alignment and support at ASIS, PSIS and pubic symphysis    Ambulation     Observational Gait   Gait: antalgic   Decreased walking speed, stride length, left stance time, left step length and right step length.   Left foot contact pattern: foot flat  Right foot contact pattern: foot flat  Left arm swing: decreased  Right arm swing: decreased  Base of support: normal    Functional Assessment   Squat   Pain and trunk lean right.       See flowsheet for details of treatment following evaluation.     Assessment & Plan     Assessment  Impairments: abnormal muscle tone, abnormal or restricted ROM, activity intolerance, impaired physical strength, lacks appropriate home exercise program and pain with function  Assessment details: The patient is a 35 y.o. female who presents to physical therapy today for pelvic pain with walking, standing, moving in bed and dressing. Upon initial evaluation, the patient demonstrates the following impairments: malalignment of pelvis with pain at pubic symphysis with L upslip, decreased strength of abdominals, low back and hip muscles, poor load transfer and increased pain/difficulty lifting LLE with ASLR. Due to these impairments, the patient is unable to get dressed, walk or stand for long periods or move in bed without pain. The patient would benefit from skilled PT services to address functional limitations and impairments and to improve patient quality of life.      Barriers to therapy: none  Prognosis: good  Functional Limitations: walking, uncomfortable because of pain, moving in bed and standing  Goals  Plan Goals: Short Term Goals (4 weeks)  1.  Patient to be compliant with initial  HEP  2.  Patient to report 25% improvement in tolerance to prolonged standing/walking   3.  Patient will report no more than 2/10 pain with activity for improved function with grocery shopping.     Long Term Goals (12 weeks)  1. Patient to be independent with final HEP  2. Pt will be able to stand and walk without pain for improved function caring for children.  3. Patient to improve PGQ score to < 10% for improved QOL     Plan  Therapy options: will be seen for skilled physical therapy services  Planned modality interventions: TENS, ultrasound, cryotherapy, thermotherapy (hydrocollator packs) and high voltage pulsed current (pain management)  Planned therapy interventions: abdominal trunk stabilization, manual therapy, neuromuscular re-education, body mechanics training, flexibility, functional ROM exercises, gait training, home exercise program, joint mobilization, therapeutic activities, stretching, strengthening, spinal/joint mobilization, soft tissue mobilization and postural training  Frequency: 1x week  Duration in visits: 12  Duration in weeks: 12  Treatment plan discussed with: patient      0840/0918  Timed:  Manual Therapy:    0     mins  82480;  Therapeutic Exercise:    0     mins  70320;     Neuromuscular Abraham:    0    mins  52138;    Therapeutic Activity:     8     mins  94542;     Gait Trainin     mins  98380;     Ultrasound:     0     mins  09151;    Electrical Stimulation:    0     mins  96404 ( );    Untimed:  Electrical Stimulation:    0     mins  03578 ( );  Dry Needling     0     mins self-pay  Traction     0     mins 64030  Low Eval     0     Mins  27015  Mod Eval     30     Mins  97415  High Eval                       0     Mins  03085  Re-Eval                           0    mins  67967      PT SIGNATURE: Roger Ortega, PT   DATE TREATMENT INITIATED: 09/10/2020    Initial Certification  Certification Period: 12/10/2020  I certify that the therapy services are furnished  while this patient is under my care.  The services outlined above are required by this patient, and will be reviewed every 90 days.     PHYSICIAN: Paolo Brooks MD      DATE:     Please sign and return via fax to [phone].. Thank you, [facility].

## 2020-09-16 ENCOUNTER — TREATMENT (OUTPATIENT)
Dept: PHYSICAL THERAPY | Facility: CLINIC | Age: 35
End: 2020-09-16

## 2020-09-16 DIAGNOSIS — M62.81 MUSCLE WEAKNESS: ICD-10-CM

## 2020-09-16 DIAGNOSIS — S33.4XXA DISLOCATION OF SYMPHYSIS PUBIS, INITIAL ENCOUNTER: Primary | ICD-10-CM

## 2020-09-16 DIAGNOSIS — R26.89 DECREASED FUNCTIONAL MOBILITY: ICD-10-CM

## 2020-09-16 DIAGNOSIS — R10.2 PELVIC PAIN: ICD-10-CM

## 2020-09-16 DIAGNOSIS — M62.838 MUSCLE SPASM: ICD-10-CM

## 2020-09-16 PROCEDURE — 97140 MANUAL THERAPY 1/> REGIONS: CPT | Performed by: PHYSICAL THERAPIST

## 2020-09-16 PROCEDURE — 97110 THERAPEUTIC EXERCISES: CPT | Performed by: PHYSICAL THERAPIST

## 2020-09-17 NOTE — PROGRESS NOTES
Physical Therapy Daily Progress Note  Patient: Tasha Mckinney   : 1985  Diagnosis/ICD-10 Code:  Dislocation of symphysis pubis, initial encounter [S33.4XXA]  Referring practitioner: Paolo Brooks MD  Date of Initial Visit: Type: THERAPY  Noted: 9/10/2020  Today's Date: 2020  Patient seen for 2 sessions      Subjective   Tasha Mckinney reports she was a little sore after last visit but noted decreased hip mm tightness.   Pain Rating (0-10): 2      Objective   Pelvic alignment symmetrical    See Exercise, Manual, and Modality Logs for complete treatment.     Patient Education: affects of mm tension on pain    Assessment/Plan  Pt tolerated manual with moderate discomfort that decreased with MFR and TPR. She reported decreased pain following treatment. Pt instructed in stretching for adductors and abdominals. Will continue manual and progress to lumbopelvic stabilization exercises as tolerated.     Progress per Plan of Care         1430/1516   Timed:         Manual Therapy:    38     mins  68865;     Therapeutic Exercise:     8    mins  32644;     Neuromuscular Abraham:    0    mins  53213;    Therapeutic Activity:     0     mins  05840;     Gait Trainin     mins  43158;     Ultrasound:     0     mins  63246;    Ionto                               0    mins   14413  Self Care                       0     mins   50986  Canalith Repos               0    mins  53911    Un-Timed:  Electrical Stimulation:    0     mins  81479 ( );  Dry Needling     0     mins self-pay  Traction     0     mins 19242  Low Eval     0     Mins  52483  Mod Eval     0     Mins  92482  High Eval                       0     Mins  83963  Re-Eval                           0    mins  03741    Timed Treatment:   46   mins   Total Treatment:     46   mins    ALEYDA Alfaro License # [id number]  Physical Therapist

## 2020-09-23 ENCOUNTER — TREATMENT (OUTPATIENT)
Dept: PHYSICAL THERAPY | Facility: CLINIC | Age: 35
End: 2020-09-23

## 2020-09-23 DIAGNOSIS — M62.838 MUSCLE SPASM: ICD-10-CM

## 2020-09-23 DIAGNOSIS — S33.4XXA DISLOCATION OF SYMPHYSIS PUBIS, INITIAL ENCOUNTER: Primary | ICD-10-CM

## 2020-09-23 DIAGNOSIS — R10.2 PELVIC PAIN: ICD-10-CM

## 2020-09-23 DIAGNOSIS — R26.89 DECREASED FUNCTIONAL MOBILITY: ICD-10-CM

## 2020-09-23 DIAGNOSIS — M62.81 MUSCLE WEAKNESS: ICD-10-CM

## 2020-09-23 PROCEDURE — 97110 THERAPEUTIC EXERCISES: CPT | Performed by: PHYSICAL THERAPIST

## 2020-09-23 PROCEDURE — 97140 MANUAL THERAPY 1/> REGIONS: CPT | Performed by: PHYSICAL THERAPIST

## 2020-09-24 NOTE — PROGRESS NOTES
Physical Therapy Daily Progress Note  Patient: Tasha Mckinney   : 1985  Diagnosis/ICD-10 Code:  Dislocation of symphysis pubis, initial encounter [S33.4XXA]  Referring practitioner: Paolo Brooks MD  Date of Initial Visit: Type: THERAPY  Noted: 9/10/2020  Today's Date: 2020  Patient seen for 3 sessions      Subjective   Tasha Mckinney reports she had decreased pain since last visit. Doing exercises and the stretching helps.  Pain Rating (0-10): 2      Objective   Symmetrical alignment of pelvis    See Exercise, Manual, and Modality Logs for complete treatment.       Assessment/Plan  Pt with decreased pain following last visit. She demonstrates symmetrical alignment of pelvis today. Tolerated manual with mild discomfort that decreased with MFR and TPR. Pt is compliant with HEP and is to continue current exercises at this time.     Progress per Plan of Care         1430/1515   Timed:         Manual Therapy:    37     mins  58118;     Therapeutic Exercise:    8     mins  85082;     Neuromuscular Abraham:    00    mins  98178;    Therapeutic Activity:     0     mins  42555;     Gait Trainin     mins  26547;     Ultrasound:     0     mins  50759;    Ionto                               0    mins   00621  Self Care                       0     mins   02394  Canalith Repos               0    mins  37366    Un-Timed:  Electrical Stimulation:    0     mins  74500 ( );  Dry Needling     0     mins self-pay  Traction     0     mins 56442  Low Eval     0     Mins  62002  Mod Eval     0     Mins  02254  High Eval                       0     Mins  56684  Re-Eval                           0    mins  22607    Timed Treatment:   45   mins   Total Treatment:     45   mins    Roger Ortega PT  [place] License # [id number]  Physical Therapist

## 2020-09-30 ENCOUNTER — TREATMENT (OUTPATIENT)
Dept: PHYSICAL THERAPY | Facility: CLINIC | Age: 35
End: 2020-09-30

## 2020-09-30 DIAGNOSIS — M62.838 MUSCLE SPASM: ICD-10-CM

## 2020-09-30 DIAGNOSIS — S33.4XXA DISLOCATION OF SYMPHYSIS PUBIS, INITIAL ENCOUNTER: Primary | ICD-10-CM

## 2020-09-30 DIAGNOSIS — M62.81 MUSCLE WEAKNESS: ICD-10-CM

## 2020-09-30 DIAGNOSIS — R10.2 PELVIC PAIN: ICD-10-CM

## 2020-09-30 DIAGNOSIS — R26.89 DECREASED FUNCTIONAL MOBILITY: ICD-10-CM

## 2020-09-30 PROCEDURE — 97110 THERAPEUTIC EXERCISES: CPT | Performed by: PHYSICAL THERAPIST

## 2020-09-30 PROCEDURE — 97140 MANUAL THERAPY 1/> REGIONS: CPT | Performed by: PHYSICAL THERAPIST

## 2020-10-01 NOTE — PROGRESS NOTES
Physical Therapy Daily Progress Note  Patient: Tasha Mckinney   : 1985  Diagnosis/ICD-10 Code:  Dislocation of symphysis pubis, initial encounter [S33.4XXA]  Referring practitioner: Paolo Brooks MD  Date of Initial Visit: Type: THERAPY  Noted: 9/10/2020  Today's Date: 10/1/2020  Patient seen for 4 sessions      Subjective   Tasha Mckinney reports she was feeling better after last treatment and her leg is much better. She did have some increased shooting abdominal pain on L side and she doesn't know why. She would feel it when getting up sometimes from lying down or when sitting for a while nursing baby.  Pain Rating (0-10): 0      Objective       See Exercise, Manual, and Modality Logs for complete treatment.     Patient Education: Trigger points and contribution to pain    Assessment/Plan  Pt is doing well with significant decrease in pain in LE. She had increased lower abdominal pain this week and demonstrates TrP in L TrA that reproduce this pain. She tolerated manual with moderate discomfort that decreased with TPR. She was instructed in home self-release of mm to assist with further decreasing pain.     Progress per Plan of Care            Timed:         Manual Therapy:    32     mins  14202;     Therapeutic Exercise:    8     mins  30460;     Neuromuscular Abraham:    0    mins  51036;    Therapeutic Activity:     0     mins  94596;     Gait Trainin     mins  74115;     Ultrasound:     0     mins  29526;    Ionto                               0    mins   07606  Self Care                       0     mins   51789  Canalith Repos               0    mins  30788    Un-Timed:  Electrical Stimulation:    00     mins  84358 ( );  Dry Needling     0     mins self-pay  Traction     0     mins 77603  Low Eval     0     Mins  36977  Mod Eval     0     Mins  85244  High Eval                       0     Mins  39835  Re-Eval                           0    mins  44325    Timed Treatment:    40   mins   Total Treatment:     40   mins    Roger Ortega, PT  [place] License # [id number]  Physical Therapist

## 2020-10-07 ENCOUNTER — TREATMENT (OUTPATIENT)
Dept: PHYSICAL THERAPY | Facility: CLINIC | Age: 35
End: 2020-10-07

## 2020-10-07 DIAGNOSIS — M62.838 MUSCLE SPASM: ICD-10-CM

## 2020-10-07 DIAGNOSIS — R10.2 PELVIC PAIN: ICD-10-CM

## 2020-10-07 DIAGNOSIS — M62.81 MUSCLE WEAKNESS: ICD-10-CM

## 2020-10-07 DIAGNOSIS — R26.89 DECREASED FUNCTIONAL MOBILITY: ICD-10-CM

## 2020-10-07 DIAGNOSIS — S33.4XXA DISLOCATION OF SYMPHYSIS PUBIS, INITIAL ENCOUNTER: Primary | ICD-10-CM

## 2020-10-07 PROCEDURE — 97110 THERAPEUTIC EXERCISES: CPT | Performed by: PHYSICAL THERAPIST

## 2020-10-07 PROCEDURE — 97140 MANUAL THERAPY 1/> REGIONS: CPT | Performed by: PHYSICAL THERAPIST

## 2020-10-09 NOTE — PROGRESS NOTES
Physical Therapy Daily Progress Note  Patient: Tasha Mckinney   : 1985  Diagnosis/ICD-10 Code:  Dislocation of symphysis pubis, initial encounter [S33.4XXA]  Referring practitioner: Paolo Brooks MD  Date of Initial Visit: Type: THERAPY  Noted: 9/10/2020  Today's Date: 10/9/2020  Patient seen for 5 sessions      Subjective   Tasha Mckinney reports she is doing much better. She is not really having any leg pain and her abdominal pain is less frequent and improves when doing tennis ball release.  Pain Rating (0-10): 0      Objective     Symmetrical pelvic alignment    See Exercise, Manual, and Modality Logs for complete treatment.       Assessment/Plan  Pt is doing well with decreased pain and improved management of abdominal pain when occurring. She demonstrates decreasing tension of pelvic girdle mm and her HEP was progressed to further lumbopelvic stability today. She will likely be seen 1-2 more visits then discharged due to progress.     Progress per Plan of Care         1430/1515   Timed:         Manual Therapy:    35     mins  27423;     Therapeutic Exercise:    10     mins  57112;     Neuromuscular Abraham:    0    mins  88307;    Therapeutic Activity:     0     mins  00929;     Gait Trainin     mins  99757;     Ultrasound:     0     mins  89838;    Ionto                               0    mins   03949  Self Care                       0     mins   37138  Canalith Repos               0    mins  72293    Un-Timed:  Electrical Stimulation:    0     mins  92981 ( );  Dry Needling     0     mins self-pay  Traction     0     mins 59322  Low Eval     0     Mins  81468  Mod Eval     0     Mins  69885  High Eval                       0     Mins  85419  Re-Eval                           0    mins  23288    Timed Treatment:   45   mins   Total Treatment:     45   mins    Roger Ortega PT  [place] License # [id number]  Physical Therapist

## 2020-10-14 ENCOUNTER — TREATMENT (OUTPATIENT)
Dept: PHYSICAL THERAPY | Facility: CLINIC | Age: 35
End: 2020-10-14

## 2020-10-14 DIAGNOSIS — M62.838 MUSCLE SPASM: ICD-10-CM

## 2020-10-14 DIAGNOSIS — R26.89 DECREASED FUNCTIONAL MOBILITY: ICD-10-CM

## 2020-10-14 DIAGNOSIS — R10.2 PELVIC PAIN: ICD-10-CM

## 2020-10-14 DIAGNOSIS — S33.4XXA DISLOCATION OF SYMPHYSIS PUBIS, INITIAL ENCOUNTER: Primary | ICD-10-CM

## 2020-10-14 DIAGNOSIS — M62.81 MUSCLE WEAKNESS: ICD-10-CM

## 2020-10-14 PROCEDURE — 97140 MANUAL THERAPY 1/> REGIONS: CPT | Performed by: PHYSICAL THERAPIST

## 2020-10-14 PROCEDURE — 97110 THERAPEUTIC EXERCISES: CPT | Performed by: PHYSICAL THERAPIST

## 2020-10-14 NOTE — PROGRESS NOTES
Re-Assessment / Re-Certification        Patient: Tasha Mckinney   : 1985  Diagnosis/ICD-10 Code:  Dislocation of symphysis pubis, initial encounter [S33.4XXA]  Referring practitioner: Paolo Brooks MD  Date of Initial Visit: Type: THERAPY  Noted: 9/10/2020  Today's Date: 10/17/2020  Patient seen for 6 sessions      Subjective:   Tasha Mckinney reports: leg is fine occasional abdominal pain improved but still comes and goes. Pain 5/10 when occurs. Sitting for pumping seems to aggravate it  Subjective Questionnaire: PGQ  Clinical Progress: improved  Home Program Compliance: Yes  Treatment has included: therapeutic exercise, manual therapy and moist heat    Subjective   Objective   Mild tension L Tra,   Symmetrical pelvic alignment  Minimal tension L hip flexors and adductors    Hip Strength   Grossly 4/5 B    Assessment/Plan   Pt has met all short term goals. She is compliant with HEP and has had significant decrease in L LE pain allowing for improved function. She still has some lower abdominal pain and demonstrates mild tension of L TrA with trigger points today. She tolerated manual therapy with mild discomfort and had decreased pain following treatment. Will continue manual and likely decrease frequency to every 2 weeks with discharge in 1-2 visits.     Progress toward previous goals: Partially Met    Plan Goals: Short Term Goals (4 weeks)  1.  Patient to be compliant with initial HEP- met  2.  Patient to report 25% improvement in tolerance to prolonged standing/walking - met  3.  Patient will report no more than 2/10 pain with activity for improved function with grocery shopping.  - met    Long Term Goals (12 weeks)  1. Patient to be independent with final HEP  2. Pt will be able to stand and walk without pain for improved function caring for children.  3. Patient to improve PGQ score to < 10% for improved QOL       Recommendations: Continue as planned  Timeframe: 1 month  Prognosis to  achieve goals: good    PT Signature: Roger Ortega PT      Based upon review of the patient's progress and continued therapy plan, it is my medical opinion that Tasha Mckinney should continue physical therapy treatment at [facility]  [address] [facility] [address] [phone].    Signature: __________________________________  Paolo Brooks MD  1305/1342  Manual Therapy:    23     mins  45128;  Therapeutic Exercise:    15     mins  92424;     Neuromuscular Abraham:    0    mins  70808;    Therapeutic Activity:     0     mins  25510;     Gait Trainin     mins  42341;     Ultrasound:     0     mins  42497;    Electrical Stimulation:    0     mins  13656 ( );  Dry Needling     0     mins self-pay    Timed Treatment:   38   mins   Total Treatment:     38   mins

## 2020-10-21 ENCOUNTER — TREATMENT (OUTPATIENT)
Dept: PHYSICAL THERAPY | Facility: CLINIC | Age: 35
End: 2020-10-21

## 2020-10-21 DIAGNOSIS — R10.2 PELVIC PAIN: ICD-10-CM

## 2020-10-21 DIAGNOSIS — S33.4XXA DISLOCATION OF SYMPHYSIS PUBIS, INITIAL ENCOUNTER: Primary | ICD-10-CM

## 2020-10-21 DIAGNOSIS — M62.81 MUSCLE WEAKNESS: ICD-10-CM

## 2020-10-21 DIAGNOSIS — R26.89 DECREASED FUNCTIONAL MOBILITY: ICD-10-CM

## 2020-10-21 DIAGNOSIS — M62.838 MUSCLE SPASM: ICD-10-CM

## 2020-10-21 PROCEDURE — 97140 MANUAL THERAPY 1/> REGIONS: CPT | Performed by: PHYSICAL THERAPIST

## 2020-10-21 PROCEDURE — 97110 THERAPEUTIC EXERCISES: CPT | Performed by: PHYSICAL THERAPIST

## 2020-10-21 NOTE — PROGRESS NOTES
Physical Therapy Daily Progress Note  Patient: Tasha Mckinney   : 1985  Diagnosis/ICD-10 Code:  Dislocation of symphysis pubis, initial encounter [S33.4XXA]  Referring practitioner: Paolo Brooks MD  Date of Initial Visit: Type: THERAPY  Noted: 9/10/2020  Today's Date: 10/21/2020  Patient seen for 7 sessions      Subjective   Tasha Mckinney reports doing better overall but still having some lower abdominal pain especially after sitting to pump.   Pain Rating (0-10): 1      Objective   Mild tension L iliopsoas    See Exercise, Manual, and Modality Logs for complete treatment.     Patient Education: decreasing frequency of treatment.     Assessment/Plan  Pt with significant decrease in pain but does continue to have lower abdominal pain with sitting for prolonged periods to pump. She demonstrates mild tension of L iliopsoas today that decreased with manual therapy. She was instructed in advanced stretching for hip flexors to be performed before and after pumping. Will decrease frequency of treatment to every 2 weeks at this time and likely discharge next visit.     Progress per Plan of Care         1430/1508   Timed:         Manual Therapy:    30     mins  28943;     Therapeutic Exercise:    8     mins  24778;     Neuromuscular Abraham:    0    mins  65405;    Therapeutic Activity:     0     mins  67740;     Gait Trainin     mins  43520;     Ultrasound:     0     mins  73717;    Ionto                               0    mins   13596  Self Care                       0     mins   64615  Canalith Repos               0    mins  09641    Un-Timed:  Electrical Stimulation:    0     mins  91581 (MC );  Dry Needling     0     mins self-pay  Traction     0     mins 79476  Low Eval     0     Mins  77785  Mod Eval     0     Mins  35962  High Eval                       0     Mins  69523  Re-Eval                           0    mins  87655    Timed Treatment:   38   mins   Total Treatment:     38    suni Ortega, PT  [place] License # [id number]  Physical Therapist

## 2020-11-04 ENCOUNTER — TREATMENT (OUTPATIENT)
Dept: PHYSICAL THERAPY | Facility: CLINIC | Age: 35
End: 2020-11-04

## 2020-11-04 DIAGNOSIS — M62.81 MUSCLE WEAKNESS: ICD-10-CM

## 2020-11-04 DIAGNOSIS — R26.89 DECREASED FUNCTIONAL MOBILITY: ICD-10-CM

## 2020-11-04 DIAGNOSIS — S33.4XXA DISLOCATION OF SYMPHYSIS PUBIS, INITIAL ENCOUNTER: Primary | ICD-10-CM

## 2020-11-04 DIAGNOSIS — R10.2 PELVIC PAIN: ICD-10-CM

## 2020-11-04 DIAGNOSIS — M62.838 MUSCLE SPASM: ICD-10-CM

## 2020-11-04 PROCEDURE — 97140 MANUAL THERAPY 1/> REGIONS: CPT | Performed by: PHYSICAL THERAPIST

## 2020-11-04 PROCEDURE — 97110 THERAPEUTIC EXERCISES: CPT | Performed by: PHYSICAL THERAPIST

## 2020-11-04 NOTE — PROGRESS NOTES
Re-Assessment / Discharge summary        Patient: Tasha Mckinney   : 1985  Diagnosis/ICD-10 Code:  Dislocation of symphysis pubis, initial encounter [S33.4XXA]  Referring practitioner: Paolo Brooks MD  Date of Initial Visit: Type: THERAPY  Noted: 9/10/2020  Today's Date: 2020  Patient seen for 8 sessions      Subjective:   Tasha Mckinney reports: she is doing really well and is able to manage abdominal pain with stretching and she didn't have to do stretches this week. She feels so much better.  Subjective Questionnaire: PGQ: 0  Clinical Progress: improved  Home Program Compliance: Yes  Treatment has included: therapeutic exercise, neuromuscular re-education, manual therapy, therapeutic activity and moist heat      Objective   Palpation   Left   No palpable tenderness to the erector spinae, external abdominal oblique, iliacus, iliopsoas, quadratus lumborum and rectus abdominus and transverse abdominus         Right   No palpable tenderness to the adductor brevis, adductor longus, adductor ivette, erector spinae, external abdominal oblique, iliacus, iliopsoas, quadratus lumborum, rectus abdominus and transverse abdominus.     Additional Palpation Details  Unremarkable for tenderness      Tenderness      Left Hip   No tenderness in the inguinal ligament and pubic tubercle. No tenderness in the ASIS, PSIS and sacroiliac joint.      Right Hip   No tenderness in the pubic tubercle. No tenderness in the ASIS, PSIS and sacroiliac joint.           Active Range of Motion      Lumbar   Normal active range of motion  No pain with movement     B Hip   Normal active range of motion         Strength/Myotome Testing      Left Hip   Planes of Motion   Flexion: 4  Extension: 4  Abduction: 4  Adduction: 4  External rotation: 4  Internal rotation: 4     Right Hip   Planes of Motion   Flexion: 4  Extension: 4  Abduction: 4  Adduction: 4  External rotation: 4  Internal rotation: 4    See flowsheet for details  of treatment.    Assessment/Plan   Pt has had significant decrease in pelvic pain and lower abdominal pain with sitting for prolonged periods to pump. She demonstrates decreased tension of L iliopsoas today and is able to manage discomfort with stretching at home. She is doing well and is appropriate for discharge to home program at this time. She was instructed in maintenance HEP today.     Progress toward previous goals: All Met    Plan Goals: Short Term Goals (4 weeks)  1.  Patient to be compliant with initial HEP -  met  2.  Patient to report 25% improvement in tolerance to prolonged standing/walking  - met  3.  Patient will report no more than 2/10 pain with activity for improved function with grocery shopping.  - met    Long Term Goals (12 weeks)  1. Patient to be independent with final HEP- met  2. Pt will be able to stand and walk without pain for improved function caring for children.- met  3. Patient to improve PGQ score to < 10% for improved QOL - met      Recommendations: Discharge      PT Signature: Roger Ortega PT      Based upon review of the patient's progress and continued therapy plan, it is my medical opinion that Tasha Mckinney should discharge from physical therapy treatment at [facility]  [address] [place] [address] [phone].    Signature: __________________________________  Paolo Brooks MD    6338/1810  Manual Therapy:    23     mins  68361;  Therapeutic Exercise:    15     mins  41811;     Neuromuscular Abraham:    0    mins  96018;    Therapeutic Activity:     0     mins  85487;     Gait Trainin     mins  75620;     Ultrasound:     0     mins  89055;    Electrical Stimulation:    0     mins  24539 ( );  Dry Needling     0     mins self-pay    Timed Treatment:   38   mins   Total Treatment:     38   mins

## 2022-01-20 ENCOUNTER — TRANSCRIBE ORDERS (OUTPATIENT)
Dept: NUTRITION | Facility: HOSPITAL | Age: 37
End: 2022-01-20

## 2022-01-20 DIAGNOSIS — K76.0 NAFLD (NONALCOHOLIC FATTY LIVER DISEASE): Primary | ICD-10-CM

## 2022-03-23 ENCOUNTER — HOSPITAL ENCOUNTER (OUTPATIENT)
Dept: NUTRITION | Facility: HOSPITAL | Age: 37
Setting detail: RECURRING SERIES
Discharge: HOME | End: 2022-03-23

## 2022-03-23 PROCEDURE — 97802 MEDICAL NUTRITION INDIV IN: CPT

## 2022-03-25 NOTE — CONSULTS
Adult Outpatient Nutrition  Assessment    Patient Name:  Tasha Mckinney  YOB: 1985  MRN: [id number]    Assessment Date:  3/25/2022    Comments:      This medical referred consult was provided via phone as patient was unable to attend an in-office appointment today due to the COVID-19 crisis. Consent for treatment was given verbally. RD spent a total of 60 minutes with patient today.      Patient is a 37 year old female seen today with a h/o gestational diabetes, kidney stones, and NAFLD. Patient shared she is seeking support to prevent diabetes and kidney stones and to promote her overall health. Patient reported she is under a lot of stress home schooling her young children and often times finds herself reaching for sweet snacks like cookies. Patient denied she has any known food allergies or chewing/swallowing issues. She also denied she uses any food assistance programs and she does not have any [employer] dietary restrictions. Patient lives at home with her  and her four children, the oldest being 11 years old and the youngest being 8 months old. Patient takes 5,000 IU of Vitamin D3 daily. Patient stated the biggest challenge she faces is knowing what to eat. Patient stated she is afraid to eat calcium because she believes it may promote kidney stones. She also stated she is afraid to eat any fat for fear it may promote a fatty liver.     24-Hour Dietary Recall:  9:00 am: 2 eggs with diced bell peppers, 1/4 avocado  11:00 am: handful of cookies  1:00 pm: 1 cup white rice  2:30 pm: 3 pieces chocolate, 1 cookie  5:00 pm: chicken sandwich from home  Beverages: 64 fl oz water, 12 fl oz Andree 8    RD emphasized the importance of finding sustainable approaches to behavior change. RD explained that eating adequate amounts of calcium (1000 mg per day) may help prevent kidney stones as calcium and oxalate often bind together in the stomach and intestines, decreasing kidney stone formation. We  discussed how excess intake of saturated fat may promote a fatty liver, but moderate fat intake, especially from omega-3 fatty acids is important for overall health and plays a role in decreasing inflammation in the body. Per dietary recall and interview, patient's diet is high in refined carbohydrates and low in fruits, vegetables, whole grains, fiber, essential fatty acids, and low fat dairy products. RD discussed the importance of eating consistent, balanced meals to promote overall health and a stable blood glucose level. RD discussed how high levels of sugar intake and high levels of refined carbohydrates may promote a fatty liver. We reviewed the different food groups and identified what foods contain protein, carbohydrates, and fat. RD used the plate method to demonstrate the components of a balanced meal. RD provided patient with several meal and snack ideas. Patient stated she never realized until saying it out loud that she does not eat enough protein and is eating a diet high in carbohydrates. She stated she could definitely focus on having more balanced snacks like greek yogurt and nuts with fruit. We discussed the importance of adequate hydration. RD recommended patient aim to drink  fl oz water per day. Patient stated she believes carrying a large water bottle with her will help her with this. RD also encouraged patient to increase her vegetable consumption with meals. Patient formed her own goals and stated she feels confident she can accomplish her goals. RD received permission to mail patient several healthy meal and snack ideas.        Goals:  1) Drink  fl oz water daily.  2) Eat 2-3 servings of calcium per day.  3) Limit sweet treats to one serving per day.      Total of 60 minutes spent with patient on nutrition counseling. Education based on Academy of Nutrition and Dietetics guidelines. Patient was provided with RD's contact information. Follow up visit is scheduled for 5/2/2022 at  11:45 am. Thank you for this referral.      Electronically signed by:  Yasmine Dumas RD  03/25/22 13:35 EDT

## 2022-03-25 NOTE — ADDENDUM NOTE
Encounter addended by: Yasmine Dumas RD on: 3/25/2022 2:01 PM   Actions taken: Charge Capture section accepted, Patient Education assessment filed, Clinical Note Signed

## 2022-05-02 ENCOUNTER — HOSPITAL ENCOUNTER (OUTPATIENT)
Dept: NUTRITION | Facility: HOSPITAL | Age: 37
Setting detail: RECURRING SERIES
Discharge: HOME | End: 2022-05-02

## 2022-05-02 NOTE — PROGRESS NOTES
Adult Outpatient Nutrition  Follow Up    Patient Name:  Tasha Mckinney  YOB: 1985  MRN: [id number]    Assessment Date:  5/2/2022    Comments:      This medical referred consult was provided via phone as patient was unable to attend an in-office appointment today due to the COVID-19 crisis. Consent for treatment was given verbally. RD spent a total of 30 minutes with patient today.      Patient is a 37 year old female with a h/o gestational diabetes, kidney stones, and NAFLD seen today for a nutrition follow up visit. Patient shared she has been doing okay the past few weeks since her initial nutrition visit. She explained she and her family were sick for about 2 weeks, so it was  Difficult to focus on nutrition during that time. Patient shared she has focused on trying to balance her meals by prioritizing protein and adding in more fruits and vegetables. She stated she has found it very manageable to incorporate 2-3 servings of calcium into her diet each day via milk, yogurt, or cheese. Patient stated she feels the biggest challenge she is facing is consistency. She stated that when she finds herself getting busy, she will often grab the quickest and most convenient meal or snack. She also stated she feels she needs to continue working to decrease her intake or refined sugar and to increase her water intake, as she is drinking ~ 72 lf oz water each day. Patient stated she has no particular questions for RD today, but she is open to any feedback and recommendations.      24-Hour Dietary Recall:  10:30 am - 2 eggs, 1 slice toast, 1/2 avocado   11:00 am - handful baby carrots   1:00 pm - 1/2 cup rice, 2 cups mixed salad, 4 oz grilled chicken  3:00 pm - 6 crackers, string cheese, 2 cookies  6:00 pm - tuna sandwich, 2 cups mixed salad  8:00 pm: 2 pieces chocolate   Beverages: 72 fl oz water        RD used motivational interviewing as primary counseling strategy. Patient shared she believes  forming a detailed grocery list each week will help her consistently make more nutritious and balanced food choices. She stated she could realistically dedicate 10-20 minutes each week at home to create this list by coming up with different meal and snack ideas. She stated she found resources mailed from RD very helpful and plans to use these when developing her meal and snack ideas. Patient stated she notices she drinks less water when she is out of the house, so she would like to work on bringing a water bottle with her. Patient stated she feels confident in her ability to continue working on these goals. She verified she still has RD contact information and will plan to contact RD with any future needs or appointment inquires.         Goals:  1) Drink  fl oz water daily - 90%  2) Eat 2-3 servings of calcium per day - 100%  3) Limit sweet treats to one serving per day.        Total of 30 minutes spent with patient on nutrition counseling. Education based on Academy of Nutrition and Dietetics guidelines. Patient was provided with RD's contact information. Thank you for this referral.            Electronically signed by:  Yasmine Dumas RD  05/02/22 11:50 EDT

## 2024-04-30 ENCOUNTER — OFFICE VISIT (OUTPATIENT)
Dept: INTERNAL MEDICINE | Facility: CLINIC | Age: 39
End: 2024-04-30
Payer: MEDICAID

## 2024-04-30 VITALS
TEMPERATURE: 98 F | SYSTOLIC BLOOD PRESSURE: 120 MMHG | HEIGHT: 66 IN | HEART RATE: 76 BPM | WEIGHT: 138 LBS | DIASTOLIC BLOOD PRESSURE: 88 MMHG | BODY MASS INDEX: 22.18 KG/M2 | RESPIRATION RATE: 18 BRPM

## 2024-04-30 DIAGNOSIS — R07.9 CHEST PAIN WITH MINIMAL RISK FOR CARDIAC ETIOLOGY: ICD-10-CM

## 2024-04-30 DIAGNOSIS — Z13.220 LIPID SCREENING: ICD-10-CM

## 2024-04-30 DIAGNOSIS — Z23 NEED FOR TDAP VACCINATION: ICD-10-CM

## 2024-04-30 DIAGNOSIS — M54.2 NECK PAIN: ICD-10-CM

## 2024-04-30 DIAGNOSIS — R82.998 LEUKOCYTES IN URINE: ICD-10-CM

## 2024-04-30 DIAGNOSIS — R07.9 CHEST PAIN, UNSPECIFIED TYPE: ICD-10-CM

## 2024-04-30 DIAGNOSIS — Z13.1 ENCOUNTER FOR SCREENING EXAMINATION FOR IMPAIRED GLUCOSE REGULATION AND DIABETES MELLITUS: ICD-10-CM

## 2024-04-30 DIAGNOSIS — E55.9 VITAMIN D DEFICIENCY: ICD-10-CM

## 2024-04-30 DIAGNOSIS — Z00.00 ANNUAL PHYSICAL EXAM: Primary | ICD-10-CM

## 2024-04-30 DIAGNOSIS — Z13.29 THYROID DISORDER SCREEN: ICD-10-CM

## 2024-04-30 LAB
BILIRUB BLD-MCNC: NEGATIVE MG/DL
CLARITY, POC: ABNORMAL
COLOR UR: YELLOW
EXPIRATION DATE: ABNORMAL
GLUCOSE UR STRIP-MCNC: NEGATIVE MG/DL
KETONES UR QL: NEGATIVE
LEUKOCYTE EST, POC: ABNORMAL
Lab: ABNORMAL
NITRITE UR-MCNC: NEGATIVE MG/ML
PH UR: 7 [PH] (ref 5–8)
PROT UR STRIP-MCNC: NEGATIVE MG/DL
RBC # UR STRIP: NEGATIVE /UL
SP GR UR: 1.01 (ref 1–1.03)
UROBILINOGEN UR QL: NORMAL

## 2024-04-30 PROCEDURE — 87086 URINE CULTURE/COLONY COUNT: CPT | Performed by: NURSE PRACTITIONER

## 2024-04-30 NOTE — PATIENT INSTRUCTIONS
"DASH Eating Plan  DASH stands for Dietary Approaches to Stop Hypertension. The DASH eating plan is a healthy eating plan that has been shown to:  Lower high blood pressure (hypertension).  Reduce your risk for type 2 diabetes, heart disease, and stroke.  Help with weight loss.  What are tips for following this plan?  Reading food labels  Check food labels for the amount of salt (sodium) per serving. Choose foods with less than 5 percent of the Daily Value (DV) of sodium. In general, foods with less than 300 milligrams (mg) of sodium per serving fit into this eating plan.  To find whole grains, look for the word \"whole\" as the first word in the ingredient list.  Shopping  Buy products labeled as \"low-sodium\" or \"no salt added.\"  Buy fresh foods. Avoid canned foods and pre-made or frozen meals.  Cooking  Try not to add salt when you cook. Use salt-free seasonings or herbs instead of table salt or sea salt. Check with your health care provider or pharmacist before using salt substitutes.  Do not valencia foods. Cook foods in healthy ways, such as baking, boiling, grilling, roasting, or broiling.  Cook using oils that are good for your heart. These include olive, canola, avocado, soybean, and sunflower oil.  Meal planning    Eat a balanced diet. This should include:  4 or more servings of fruits and 4 or more servings of vegetables each day. Try to fill half of your plate with fruits and vegetables.  6-8 servings of whole grains each day.  6 or less servings of lean meat, poultry, or fish each day. 1 oz is 1 serving. A 3 oz (85 g) serving of meat is about the same size as the palm of your hand. One egg is 1 oz (28 g).  2-3 servings of low-fat dairy each day. One serving is 1 cup (237 mL).  1 serving of nuts, seeds, or beans 5 times each week.  2-3 servings of heart-healthy fats. Healthy fats called omega-3 fatty acids are found in foods such as walnuts, flaxseeds, fortified milks, and eggs. These fats are also found in " cold-water fish, such as sardines, salmon, and mackerel.  Limit how much you eat of:  Canned or prepackaged foods.  Food that is high in trans fat, such as fried foods.  Food that is high in saturated fat, such as fatty meat.  Desserts and other sweets, sugary drinks, and other foods with added sugar.  Full-fat dairy products.  Do not salt foods before eating.  Do not eat more than 4 egg yolks a week.  Try to eat at least 2 vegetarian meals a week.  Eat more home-cooked food and less restaurant, buffet, and fast food.  Lifestyle  When eating at a restaurant, ask if your food can be made with less salt or no salt.  If you drink alcohol:  Limit how much you have to:  0-1 drink a day if you are female.  0-2 drinks a day if you are male.  Know how much alcohol is in your drink. In the [place] one drink is one 12 oz bottle of beer (355 mL), one 5 oz glass of wine (148 mL), or one 1½ oz glass of hard liquor (44 mL).  General information  Avoid eating more than 2,300 mg of salt a day. If you have hypertension, you may need to reduce your sodium intake to 1,500 mg a day.  Work with your provider to stay at a healthy body weight or lose weight. Ask what the best weight range is for you.  On most days of the week, get at least 30 minutes of exercise that causes your heart to beat faster. This may include walking, swimming, or biking.  Work with your provider or dietitian to adjust your eating plan to meet your specific calorie needs.  What foods should I eat?  Fruits  All fresh, dried, or frozen fruit. Canned fruits that are in their natural juice and do not have sugar added to them.  Vegetables  Fresh or frozen vegetables that are raw, steamed, roasted, or grilled. Low-sodium or reduced-sodium tomato and vegetable juice. Low-sodium or reduced-sodium tomato sauce and tomato paste. Low-sodium or reduced-sodium canned vegetables.  Grains  Whole-grain or whole-wheat bread. Whole-grain or whole-wheat pasta. Brown rice. Oatmeal.  Quinoa. Bulgur. Whole-grain and low-sodium cereals. Mary bread. Low-fat, low-sodium crackers. Whole-wheat flour tortillas.  Meats and other proteins  Skinless chicken or turkey. Ground chicken or turkey. Pork with fat trimmed off. Fish and seafood. Egg whites. Dried beans, peas, or lentils. Unsalted nuts, nut butters, and seeds. Unsalted canned beans. Lean cuts of beef with fat trimmed off. Low-sodium, lean precooked or cured meat, such as sausages or meat loaves.  Dairy  Low-fat (1%) or fat-free (skim) milk. Reduced-fat, low-fat, or fat-free cheeses. Nonfat, low-sodium ricotta or cottage cheese. Low-fat or nonfat yogurt. Low-fat, low-sodium cheese.  Fats and oils  Soft margarine without trans fats. Vegetable oil. Reduced-fat, low-fat, or light mayonnaise and salad dressings (reduced-sodium). Canola, safflower, olive, avocado, soybean, and sunflower oils. Avocado.  Seasonings and condiments  Herbs. Spices. Seasoning mixes without salt.  Other foods  Unsalted popcorn and pretzels. Fat-free sweets.  The items listed above may not be all the foods and drinks you can have. Talk to a dietitian to learn more.  What foods should I avoid?  Fruits  Canned fruit in a light or heavy syrup. Fried fruit. Fruit in cream or butter sauce.  Vegetables  Creamed or fried vegetables. Vegetables in a cheese sauce. Regular canned vegetables that are not marked as low-sodium or reduced-sodium. Regular canned tomato sauce and paste that are not marked as low-sodium or reduced-sodium. Regular tomato and vegetable juices that are not marked as low-sodium or reduced-sodium. Pickles. Olives.  Grains  Baked goods made with fat, such as croissants, muffins, or some breads. Dry pasta or rice meal packs.  Meats and other proteins  Fatty cuts of meat. Ribs. Fried meat. Rodriguez. Bologna, salami, and other precooked or cured meats, such as sausages or meat loaves, that are not lean and low in sodium. Fat from the back of a pig (fatback). Agapito.  Salted nuts and seeds. Canned beans with added salt. Canned or smoked fish. Whole eggs or egg yolks. Chicken or turkey with skin.  Dairy  Whole or 2% milk, cream, and half-and-half. Whole or full-fat cream cheese. Whole-fat or sweetened yogurt. Full-fat cheese. Nondairy creamers. Whipped toppings. Processed cheese and cheese spreads.  Fats and oils  Butter. Stick margarine. Lard. Shortening. Ghee. Rodriguez fat. Tropical oils, such as coconut, palm kernel, or palm oil.  Seasonings and condiments  Onion salt, garlic salt, seasoned salt, table salt, and sea salt. Worcestershire sauce. Tartar sauce. Barbecue sauce. Teriyaki sauce. Soy sauce, including reduced-sodium soy sauce. Steak sauce. Canned and packaged gravies. Fish sauce. Oyster sauce. Cocktail sauce. Store-bought horseradish. Ketchup. Mustard. Meat flavorings and tenderizers. Bouillon cubes. Hot sauces. Pre-made or packaged marinades. Pre-made or packaged taco seasonings. Relishes. Regular salad dressings.  Other foods  Salted popcorn and pretzels.  The items listed above may not be all the foods and drinks you should avoid. Talk to a dietitian to learn more.  Where to find more information  National Heart, Lung, and Blood [place] (NHLBI): nhlbi.nih.gov  American Heart Association (AHA): heart.org  Academy of Nutrition and Dietetics: eatright.org  National Kidney Foundation (NKF): kidney.org  This information is not intended to replace advice given to you by your health care provider. Make sure you discuss any questions you have with your health care provider.  Document Revised: 01/04/2024 Document Reviewed: 01/04/2024  Elsevier Patient Education © 2024 [email or web address]    Advance Care Planning and Advance Directives     You make decisions on a daily basis - decisions about where you want to live, your career, your home, your life. Perhaps one of the most important decisions you face is your choice for future medical care. Take time to talk with your family and  your healthcare team and start planning today.  Advance Care Planning is a process that can help you:  Understand possible future healthcare decisions in light of your own experiences  Reflect on those decision in light of your goals and values  Discuss your decisions with those closest to you and the healthcare professionals that care for you  Make a plan by creating a document that reflects your wishes    Surrogate Decision Maker  In the event of a medical emergency, which has left you unable to communicate or to make your own decisions, you would need someone to make decisions for you.  It is important to discuss your preferences for medical treatment with this person while you are in good health.     Qualities of a surrogate decision maker:  Willing to take on this role and responsibility  Knows what you want for future medical care  Willing to follow your wishes even if they don't agree with them  Able to make difficult medical decisions under stressful circumstances    Advance Directives  These are legal documents you can create that will guide your healthcare team and decision maker(s) when needed. These documents can be stored in the electronic medical record.    Living Will - a legal document to guide your care if you have a terminal condition or a serious illness and are unable to communicate. States vary by statute in document names/types, but most forms may include one or more of the following:        -  Directions regarding life-prolonging treatments        -  Directions regarding artificially provided nutrition/hydration        -  Choosing a healthcare decision maker        -  Direction regarding organ/tissue donation    Durable Power of  for Healthcare - this document names an -in-fact to make medical decisions for you, but it may also allow this person to make personal and financial decisions for you. Please seek the advice of an  if you need this type of  document.    **Advance Directives are not required and no one may discriminate against you if you do not sign one.    Medical Orders  Many states allow specific forms/orders signed by your physician to record your wishes for medical treatment in your current state of health. This form, signed in personal communication with your physician, addresses resuscitation and other medical interventions that you may or may not want.      For more information or to schedule a time with a [facility] Advance Care Planning Facilitator contact: [facility]/ACP or call [phone] and someone will contact you directly.[email or web address]

## 2024-04-30 NOTE — PROGRESS NOTES
Chief Complaint  Annual Exam (New patient. ) and Hypertension    Subjective          Tasha Mckinney presents to [facility]  History of Present Illness  History of Present Illness    Patient comes to clinic today to establish care.  Tasha is a 39-year-old female.    She has new concern since her last child was born approximately 4 year ago she has had elevated intermittent BP and now staying elevated. She has had chest pain in left chest intermittent when bp is elevated.  This is associated when the base of posterior neck hurts and into left chest and BP goes up.  She does have neck pain and stiffness.  She has Left neck pain and been years and tightness.  She does have nausea but always has nausea with tension headaches in her neck.  Has had palps and had testing and was normal except elevated heart rate.   She has been measuring 140/90 average. She has been taking supplement blood pressure vitamin supplement.    For past months she has had every other week swollen nodes B axilla come and go.  I have offered US and mammogram, she has declined.  With risk and benefits reviewed.    She also has had them in neck but no painful and less often only associated with sore throat.     Past medical history includes GERD not currently medicated    She had gestational diabetes in 2010 and 15 and 2017. No polys now. She is fasting today.    She has a history of headaches. She ha snot had neuro consult in past and has had CT had and has chronic sinusitis and was today due surgery years ago and did not choose to do so.  Was supposed to do steroid washing but insurance would not cover and using casimiro pot.    She has a history of kidney stones in 2014 and 2022.    She has a history of tachycardia, hypertension, hyperlipidemia, and liver disease-fatty liver.  Vit d def last was 17 not consistently     No surgical history. Except varicosity laser last year 2023    Family  "medical history includes mother with diabetes hypertension and liver disease thyroid disease   Father John, and diabetes hyperlipidemia coronary artery disease heart attack at age 50 now 70 years old and precancerous colon polyps.  Maternal aunt Barbara had breast cancer 70    Social history non-smoker no alcohol use no substance abuse she is sexually active with male partner no vaping    She is due for COVID and Tdap vaccine and she would like to obtain  Objective   Vital Signs:   /88 (BP Location: Left arm, Patient Position: Sitting, Cuff Size: Adult)   Pulse 76   Temp 98 °F (36.7 °C) (Infrared)   Resp 18   Ht 167.6 cm (66\")   Wt 62.6 kg (138 lb)   BMI 22.27 kg/m²     Physical Exam  Vitals and nursing note reviewed.   Constitutional:       General: She is not in acute distress.     Appearance: Normal appearance. She is well-developed. She is not ill-appearing.   HENT:      Head: Normocephalic and atraumatic.      Right Ear: Tympanic membrane and external ear normal.      Left Ear: Tympanic membrane and external ear normal.      Nose: Nose normal.      Mouth/Throat:      Mouth: Mucous membranes are moist.      Pharynx: Oropharynx is clear. No oropharyngeal exudate or posterior oropharyngeal erythema.   Eyes:      General: No scleral icterus.        Right eye: No discharge.         Left eye: No discharge.      Conjunctiva/sclera: Conjunctivae normal.      Pupils: Pupils are equal, round, and reactive to light.   Neck:      Thyroid: No thyromegaly.      Vascular: No carotid bruit.   Cardiovascular:      Rate and Rhythm: Normal rate and regular rhythm.      Heart sounds: Normal heart sounds. No murmur heard.     No friction rub. No gallop.   Pulmonary:      Effort: Pulmonary effort is normal.      Breath sounds: Normal breath sounds.   Chest:   Breasts:     Right: Normal.      Left: Normal.   Abdominal:      General: Bowel sounds are normal. There is no distension.      Palpations: Abdomen is soft. There is " no mass.      Tenderness: There is no abdominal tenderness. There is no guarding or rebound.      Hernia: No hernia is present.   Musculoskeletal:         General: Normal range of motion.      Cervical back: Normal range of motion and neck supple.   Lymphadenopathy:      Head:      Right side of head: No submental, submandibular, tonsillar, preauricular, posterior auricular or occipital adenopathy.      Left side of head: No submental, submandibular, tonsillar, preauricular, posterior auricular or occipital adenopathy.      Cervical: No cervical adenopathy.      Right cervical: No superficial, deep or posterior cervical adenopathy.     Left cervical: No superficial, deep or posterior cervical adenopathy.      Upper Body:      Right upper body: No supraclavicular, axillary, pectoral or epitrochlear adenopathy.      Left upper body: No supraclavicular, axillary, pectoral or epitrochlear adenopathy.      Lower Body: No right inguinal adenopathy. No left inguinal adenopathy.   Skin:     General: Skin is warm and dry.      Capillary Refill: Capillary refill takes 2 to 3 seconds.      Coloration: Skin is not pale.   Neurological:      Mental Status: She is alert and oriented to person, place, and time.      Deep Tendon Reflexes: Reflexes normal.   Psychiatric:         Mood and Affect: Mood normal.         Behavior: Behavior normal.         Thought Content: Thought content normal.         Judgment: Judgment normal.        Result Review :            ECG 12 Lead    Date/Time: 4/30/2024 9:09 AM  Performed by: Yenni Kumar APRN    Authorized by: Yenni Kumar APRN  Comparison: not compared with previous ECG             Assessment and Plan    Diagnoses and all orders for this visit:    1. Annual physical exam (Primary)  -     CBC & Differential; Future  -     CBC & Differential    2. Chest pain with minimal risk for cardiac etiology    3. Neck pain  -     Ambulatory Referral to Physical Therapy    4. Vitamin D deficiency  -      Vitamin D,25-Hydroxy; Future  -     Vitamin D,25-Hydroxy    5. Lipid screening  -     Lipid Panel; Future  -     Lipid Panel    6. Thyroid disorder screen  -     TSH; Future  -     TSH    7. Chest pain, unspecified type  -     ECG 12 Lead    8. Encounter for screening examination for impaired glucose regulation and diabetes mellitus  -     Comprehensive Metabolic Panel; Future  -     POC Urinalysis Dipstick, Automated; Future  -     POC Urinalysis Dipstick, Automated  -     Comprehensive Metabolic Panel    9. Need for Tdap vaccination    10. Leukocytes in urine  -     Cancel: Urine Culture - Urine, Urine, Clean Catch  -     Urine Culture - Urine, Urine, Clean Catch; Future  -     Urine Culture - Urine, Urine, Clean Catch      Assessment & Plan          BMI is within normal parameters. No other follow-up for BMI required.    DASH diet and trial 2 weeks no change discussed lisinopril with MOA side effects and benefits. She will let me know.      Depression: PHQ-2/9 Depression Screening  Little interest or pleasure in doing things?     Feeling down, depressed, or hopeless?     PHQ-2 Total Score     PHQ-9 Total Score 0        AWV: na  A1C:   Lab Results   Component Value Date    HGBA1C 5.2 09/08/2017      ACP: Advance Care Planning   ACP discussion was held with the patient during this visit. Patient does not have an advance directive, information provided.   Mammogram: na  Colonoscopy: na  Patient education regarding the importance of avoidance of texting while driving and the need for wearing seatbelt.  Use of sunscreen, use of helmets while on bikes.  The appropriate amounts of sleep and H20 consumption per day was reviewed with pt.  The need for healthy well-balanced diet based off the food guide pyramid and avoidance of skipping meals along with following a NCS diet with appropriate amounts of fats, protein, and carbohydrate and low sodium diet. Encouraging 30minutes of cardio 5d weekly and muscle strengthening  3x weekly.   She sees Dr Daily and plans visit for pap.    Follow Up   Return in about 1 year (around 4/30/2025) for Annual, fasting.  Patient was given instructions and counseling regarding her condition or for health maintenance advice. Please see specific information pulled into the AVS if appropriate.     RTC/call  If symptoms worsen  Meds MOA and SE's reviewed and pt v/u    Transcribed from ambient dictation for DURAN Tavarez by DURAN Tavarez.  04/30/24   08:55 EDT

## 2024-05-01 ENCOUNTER — PATIENT ROUNDING (BHMG ONLY) (OUTPATIENT)
Dept: INTERNAL MEDICINE | Facility: CLINIC | Age: 39
End: 2024-05-01
Payer: MEDICAID

## 2024-05-01 LAB
25(OH)D3+25(OH)D2 SERPL-MCNC: 35 NG/ML (ref 30–100)
ALBUMIN SERPL-MCNC: 4.7 G/DL (ref 3.5–5.2)
ALBUMIN/GLOB SERPL: 1.8 G/DL
ALP SERPL-CCNC: 77 U/L (ref 39–117)
ALT SERPL-CCNC: 23 U/L (ref 1–33)
AST SERPL-CCNC: 15 U/L (ref 1–32)
BACTERIA SPEC AEROBE CULT: NORMAL
BASOPHILS # BLD AUTO: 0.03 10*3/MM3 (ref 0–0.2)
BASOPHILS NFR BLD AUTO: 0.5 % (ref 0–1.5)
BILIRUB SERPL-MCNC: 0.4 MG/DL (ref 0–1.2)
BUN SERPL-MCNC: 13 MG/DL (ref 6–20)
BUN/CREAT SERPL: 15.3 (ref 7–25)
CALCIUM SERPL-MCNC: 9.7 MG/DL (ref 8.6–10.5)
CHLORIDE SERPL-SCNC: 106 MMOL/L (ref 98–107)
CHOLEST SERPL-MCNC: 207 MG/DL (ref 0–200)
CO2 SERPL-SCNC: 25.6 MMOL/L (ref 22–29)
CREAT SERPL-MCNC: 0.85 MG/DL (ref 0.57–1)
EGFRCR SERPLBLD CKD-EPI 2021: 90.1 ML/MIN/1.73
EOSINOPHIL # BLD AUTO: 0.11 10*3/MM3 (ref 0–0.4)
EOSINOPHIL NFR BLD AUTO: 1.9 % (ref 0.3–6.2)
ERYTHROCYTE [DISTWIDTH] IN BLOOD BY AUTOMATED COUNT: 12.3 % (ref 12.3–15.4)
GLOBULIN SER CALC-MCNC: 2.6 GM/DL
GLUCOSE SERPL-MCNC: 89 MG/DL (ref 65–99)
HCT VFR BLD AUTO: 41.2 % (ref 34–46.6)
HDLC SERPL-MCNC: 59 MG/DL (ref 40–60)
HGB BLD-MCNC: 13.4 G/DL (ref 12–15.9)
IMM GRANULOCYTES # BLD AUTO: 0.01 10*3/MM3 (ref 0–0.05)
IMM GRANULOCYTES NFR BLD AUTO: 0.2 % (ref 0–0.5)
LDLC SERPL CALC-MCNC: 118 MG/DL (ref 0–100)
LYMPHOCYTES # BLD AUTO: 2.04 10*3/MM3 (ref 0.7–3.1)
LYMPHOCYTES NFR BLD AUTO: 35.7 % (ref 19.6–45.3)
MCH RBC QN AUTO: 28.2 PG (ref 26.6–33)
MCHC RBC AUTO-ENTMCNC: 32.5 G/DL (ref 31.5–35.7)
MCV RBC AUTO: 86.6 FL (ref 79–97)
MONOCYTES # BLD AUTO: 0.35 10*3/MM3 (ref 0.1–0.9)
MONOCYTES NFR BLD AUTO: 6.1 % (ref 5–12)
NEUTROPHILS # BLD AUTO: 3.17 10*3/MM3 (ref 1.7–7)
NEUTROPHILS NFR BLD AUTO: 55.6 % (ref 42.7–76)
NRBC BLD AUTO-RTO: 0 /100 WBC (ref 0–0.2)
PLATELET # BLD AUTO: 247 10*3/MM3 (ref 140–450)
POTASSIUM SERPL-SCNC: 4.8 MMOL/L (ref 3.5–5.2)
PROT SERPL-MCNC: 7.3 G/DL (ref 6–8.5)
RBC # BLD AUTO: 4.76 10*6/MM3 (ref 3.77–5.28)
SODIUM SERPL-SCNC: 142 MMOL/L (ref 136–145)
TRIGL SERPL-MCNC: 171 MG/DL (ref 0–150)
TSH SERPL DL<=0.005 MIU/L-ACNC: 2.59 UIU/ML (ref 0.27–4.2)
VLDLC SERPL CALC-MCNC: 30 MG/DL (ref 5–40)
WBC # BLD AUTO: 5.71 10*3/MM3 (ref 3.4–10.8)

## 2024-05-01 NOTE — PROGRESS NOTES
A [employer] message has been sent to the patient for patient rounding with [facility].

## 2024-10-21 ENCOUNTER — OFFICE VISIT (OUTPATIENT)
Dept: INTERNAL MEDICINE | Facility: CLINIC | Age: 39
End: 2024-10-21
Payer: MEDICAID

## 2024-10-21 VITALS
HEART RATE: 84 BPM | RESPIRATION RATE: 18 BRPM | OXYGEN SATURATION: 99 % | SYSTOLIC BLOOD PRESSURE: 110 MMHG | BODY MASS INDEX: 21.85 KG/M2 | TEMPERATURE: 97.5 F | DIASTOLIC BLOOD PRESSURE: 78 MMHG | WEIGHT: 135.38 LBS

## 2024-10-21 DIAGNOSIS — R03.0 ELEVATED BLOOD PRESSURE READING: ICD-10-CM

## 2024-10-21 DIAGNOSIS — R73.9 INCREASED BLOOD GLUCOSE: ICD-10-CM

## 2024-10-21 DIAGNOSIS — R53.83 FATIGUE, UNSPECIFIED TYPE: ICD-10-CM

## 2024-10-21 DIAGNOSIS — R59.0 AXILLARY ADENOPATHY: ICD-10-CM

## 2024-10-21 DIAGNOSIS — G44.201 INTRACTABLE TENSION-TYPE HEADACHE, UNSPECIFIED CHRONICITY PATTERN: Primary | ICD-10-CM

## 2024-10-21 LAB — HBA1C MFR BLD: 5 % (ref 4.8–5.6)

## 2024-10-21 PROCEDURE — 83615 LACTATE (LD) (LDH) ENZYME: CPT | Performed by: NURSE PRACTITIONER

## 2024-10-21 PROCEDURE — 1159F MED LIST DOCD IN RCRD: CPT | Performed by: NURSE PRACTITIONER

## 2024-10-21 PROCEDURE — 83036 HEMOGLOBIN GLYCOSYLATED A1C: CPT | Performed by: NURSE PRACTITIONER

## 2024-10-21 PROCEDURE — 84165 PROTEIN E-PHORESIS SERUM: CPT | Performed by: NURSE PRACTITIONER

## 2024-10-21 PROCEDURE — 86480 TB TEST CELL IMMUN MEASURE: CPT | Performed by: NURSE PRACTITIONER

## 2024-10-21 PROCEDURE — 82607 VITAMIN B-12: CPT | Performed by: NURSE PRACTITIONER

## 2024-10-21 PROCEDURE — 1125F AMNT PAIN NOTED PAIN PRSNT: CPT | Performed by: NURSE PRACTITIONER

## 2024-10-21 PROCEDURE — 84155 ASSAY OF PROTEIN SERUM: CPT | Performed by: NURSE PRACTITIONER

## 2024-10-21 PROCEDURE — 99214 OFFICE O/P EST MOD 30 MIN: CPT | Performed by: NURSE PRACTITIONER

## 2024-10-21 PROCEDURE — 1160F RVW MEDS BY RX/DR IN RCRD: CPT | Performed by: NURSE PRACTITIONER

## 2024-10-21 PROCEDURE — 82746 ASSAY OF FOLIC ACID SERUM: CPT | Performed by: NURSE PRACTITIONER

## 2024-10-21 RX ORDER — OMEPRAZOLE 40 MG/1
CAPSULE, DELAYED RELEASE ORAL
COMMUNITY

## 2024-10-21 RX ORDER — BACLOFEN 10 MG/1
10 TABLET ORAL 3 TIMES DAILY
Qty: 30 TABLET | Refills: 0 | Status: SHIPPED | OUTPATIENT
Start: 2024-10-21

## 2024-10-21 NOTE — PROGRESS NOTES
Chief Complaint  Hypertension and Headache    Subjective          Tasha Mckinney presents to [facility]  History of Present Illness  History of Present Illness      The patient is here today for discussion of hypertension and headache.    She has been experiencing headaches for several months, which have become more frequent recently. These headaches, located at the back of her head, are similar to those she experienced in the spring. They are accompanied by a feeling of tightness and can be severe, as was the case yesterday. She also experiences pain in her eyes during these episodes. Excedrin provides some relief, but ibuprofen does not seem to help. Physical therapy provided some relief, reducing the tightness in her neck. She continues to do the exercises recommended by her physical therapist, which have improved her ability to turn her neck. However, the results were not as significant as expected. She experiences these headaches approximately three times a week.    She believes her blood pressure may be elevated due to the pain from her headaches. Her systolic blood pressure typically stays around 120, and her diastolic pressure is usually in the 90s.    She has noticed intermittent swelling of the lymph nodes in her axilla, occurring every 3 to 4 weeks over the past two months. This swelling seems to occur a week before her menstrual cycle. She has been using natural remedies such as apple cider vinegar compresses, which seem to reduce the swelling within a few days. She also experiences night sweats, which vary in frequency.    She has had pregestational diabetes in the past, with her fasting glucose levels remaining normal. However, her glucose levels tend to rise 2 to 3 hours after eating. She also reports feeling fatigued, with some days being worse than others. She takes B complex supplements for energy. She wonders if she might be entering  perimenopause.    FAMILY HISTORY  Her father had heart problems and had stents put in his arteries because he had a heart attack. He also had the same headaches.    Patient initial visit 4/30/2024 for annual and concern for blood pressure.  she has had elevated intermittent BP and now staying elevated. She has had chest pain in left chest intermittent when bp is elevated. This is associated when the base of posterior neck hurts and into left chest and BP goes up. She does have neck pain and stiffness.  She had readings of 140/90 should have been taken supplements to help with her blood pressure.    She had also noted swollen nodes in her axilla that were intermittent.  We reviewed option for mammogram and ultrasound however she declined with risk and benefits reviewed.    On 2010 she had gestational diabetes denied polys    She had CT of her head in the past due to headaches noting chronic sinusitis was offered sinus surgery but declined.  She did see ENT who ordered's steroid sinus rinses but insurance would not cover.     She has a history of vitamin D deficiency not taking vitamin D consistently.   History of tachycardia hyperlipidemia fatty liver disease she has not seen neurology in the pastHer physical labs noted a low normal vitamin D her lipids noted an LDL of 118 total cholesterol 207 and triglycerides 171 mild hyperlipidemia her thyroid and chemistry panel were normal including her glucose.  Her complete blood count was normal.      patient complains of headache on the back of the head longer than 2 weeks with occurrence off and on.  She takes Motrin for comfort.  Patient completed physical therapy and at discharge she was noted to be 70% improved since start of treatment.     Objective   Vital Signs:   /78 (BP Location: Right arm, Patient Position: Sitting, Cuff Size: Adult)   Pulse 84   Temp 97.5 °F (36.4 °C) (Temporal)   Resp 18   Wt 61.4 kg (135 lb 6 oz)   SpO2 99%   BMI 21.85 kg/m²      Physical Exam  Vitals and nursing note reviewed.   Constitutional:       General: She is not in acute distress.     Appearance: Normal appearance. She is well-developed. She is not ill-appearing.   HENT:      Head: Normocephalic and atraumatic.   Eyes:      General: No scleral icterus.  Neck:      Thyroid: No thyromegaly.   Cardiovascular:      Rate and Rhythm: Normal rate and regular rhythm.   Pulmonary:      Effort: Pulmonary effort is normal.      Breath sounds: Normal breath sounds.   Abdominal:      General: Bowel sounds are normal. There is no distension.      Palpations: Abdomen is soft.      Tenderness: There is no abdominal tenderness.   Musculoskeletal:      Comments: Decreased range of motion in neck with tenderness to palpate   Lymphadenopathy:      Head:      Right side of head: No submental, submandibular, tonsillar, preauricular, posterior auricular or occipital adenopathy.      Left side of head: No submental, submandibular, tonsillar, preauricular, posterior auricular or occipital adenopathy.      Cervical: No cervical adenopathy.      Right cervical: No superficial, deep or posterior cervical adenopathy.     Left cervical: No superficial, deep or posterior cervical adenopathy.      Upper Body:      Right upper body: Axillary adenopathy present. No supraclavicular, pectoral or epitrochlear adenopathy.      Left upper body: Axillary adenopathy present. No supraclavicular, pectoral or epitrochlear adenopathy.      Lower Body: No right inguinal adenopathy. No left inguinal adenopathy.      Comments: Unable to palpate in office however pt describes these areas which enlarge with sewlling   Skin:     Capillary Refill: Capillary refill takes 2 to 3 seconds.      Coloration: Skin is not pale.   Neurological:      Mental Status: She is alert and oriented to person, place, and time.      Motor: No weakness.      Deep Tendon Reflexes: Reflexes normal.   Psychiatric:         Mood and Affect: Mood normal.          Behavior: Behavior normal.        Result Review :                 Assessment and Plan    Diagnoses and all orders for this visit:    1. Intractable tension-type headache, unspecified chronicity pattern (Primary)  -     baclofen (LIORESAL) 10 MG tablet; Take 1 tablet by mouth 3 (Three) Times a Day.  Dispense: 30 tablet; Refill: 0    2. Elevated blood pressure reading    3. Axillary adenopathy  -     US breast bilateral complete; Future  -     Protein Elec + Interp, Serum; Future  -     QuantiFERON TB Gold; Future  -     Lactate Dehydrogenase; Future  -     Protein Elec + Interp, Serum  -     Cancel: QuantiFERON TB Gold  -     Lactate Dehydrogenase  -     QuantiFERON TB Plus Client Incubated; Future  -     QuantiFERON TB Plus Client Incubated    4. Increased blood glucose  -     Cancel: Hemoglobin A1c; Future  -     Hemoglobin A1c; Future  -     Hemoglobin A1c    5. Fatigue, unspecified type  -     Protein Elec + Interp, Serum; Future  -     Vitamin B12; Future  -     Folate; Future  -     Protein Elec + Interp, Serum  -     Vitamin B12  -     Folate      Assessment & Plan  1. Headache.  The headache appears to be musculoskeletal in nature, possibly originating from the neck and resulting in a myofascial headache. Another potential cause could be elevated blood pressure. Baclofen, a muscle relaxer, will be prescribed for use as needed, up to three times daily for a period of 10 days. If there is no improvement, a low dose blood pressure medication will be initiated for a month to assess its effectiveness. She is advised to reach out via [employer] in 10 days to report on her symptoms.    2. Hypertension.  Her blood pressure was 110/78 during the visit, but she reported fluctuations, with systolic readings usually around the 120s and diastolic readings often in the 90s. If the headaches do not improve with the muscle relaxer, a low dose blood pressure medication will be considered.    3. Axillary  lymphadenopathy.  She reported intermittent swelling of the lymph nodes in the axilla, occurring every three to four weeks, typically before her menstrual cycle. An ultrasound will be ordered, and additional blood work will be conducted to investigate other potential causes of the swollen lymph nodes. She is advised to come in when the swelling occurs for further evaluation.    4. Hyperglycemia.  She reported elevated blood sugar readings of 156 and 154 mg/dL on separate occasions. An A1c test will be ordered to assess her blood sugar control.          BMI is within normal parameters. No other follow-up for BMI required.    Addendum aunt with breast ca and did not get testing though and did not want to know.      Follow Up   Return 5/1/25, for Annual, fasting.  Patient was given instructions and counseling regarding her condition or for health maintenance advice. Please see specific information pulled into the AVS if appropriate.     RTC/call  If symptoms worsen  Meds MOA and SE's reviewed and pt v/u    10/21/24   13:01 EDT    Patient or patient representative verbalized consent to the visit recording.  I have personally performed the services described in this document as transcribed by the above individual, and it is both accurate and complete.

## 2024-10-22 LAB
FOLATE SERPL-MCNC: 15 NG/ML (ref 4.78–24.2)
LDH SERPL-CCNC: 180 U/L (ref 135–214)
VIT B12 BLD-MCNC: 538 PG/ML (ref 211–946)

## 2024-10-23 LAB
ALBUMIN SERPL ELPH-MCNC: 4.2 G/DL (ref 2.9–4.4)
ALBUMIN/GLOB SERPL: 1.3 {RATIO} (ref 0.7–1.7)
ALPHA1 GLOB SERPL ELPH-MCNC: 0.2 G/DL (ref 0–0.4)
ALPHA2 GLOB SERPL ELPH-MCNC: 0.6 G/DL (ref 0.4–1)
B-GLOBULIN SERPL ELPH-MCNC: 1 G/DL (ref 0.7–1.3)
GAMMA GLOB SERPL ELPH-MCNC: 1.4 G/DL (ref 0.4–1.8)
GLOBULIN SER CALC-MCNC: 3.3 G/DL (ref 2.2–3.9)
LABORATORY COMMENT REPORT: NORMAL
M PROTEIN SERPL ELPH-MCNC: NORMAL G/DL
PROT PATTERN SERPL ELPH-IMP: NORMAL
PROT SERPL-MCNC: 7.5 G/DL (ref 6–8.5)

## 2024-10-24 LAB
GAMMA INTERFERON BACKGROUND BLD IA-ACNC: 0.05 IU/ML
M TB IFN-G BLD-IMP: NEGATIVE
M TB IFN-G CD4+ BCKGRND COR BLD-ACNC: 0.05 IU/ML
M TB IFN-G CD4+CD8+ BCKGRND COR BLD-ACNC: 0.05 IU/ML
MITOGEN IGNF BCKGRD COR BLD-ACNC: >10 IU/ML
SERVICE CMNT-IMP: NORMAL

## 2024-12-01 ENCOUNTER — TELEPHONE (OUTPATIENT)
Dept: INTERNAL MEDICINE | Facility: CLINIC | Age: 39
End: 2024-12-01
Payer: MEDICAID

## 2024-12-01 DIAGNOSIS — R59.0 AXILLARY ADENOPATHY: Primary | ICD-10-CM

## 2024-12-02 NOTE — TELEPHONE ENCOUNTER
Please review [employer] message with patient.        I received message from mammography that you did not want to complete the diagnostic mammogram.  Axillary lymphadenopathy can be caused by benign and malignant processes. In breast imaging, it important to distinguish normal lymph nodes from abnormal lymph nodes which often warrant additional workup.  An ultrasound of the axillary region along with a diagnostic mammogram gives best overall information regarding these concerns.  Please let me know if you are agreeable to complete the mammogram.     Thank you, Sandra

## 2024-12-03 NOTE — TELEPHONE ENCOUNTER
Nanci,  Since the patient is unwilling to complete the mammogram I would like the ultrasound of breast completed if mammography will do so.

## 2024-12-04 NOTE — TELEPHONE ENCOUNTER
Both scans have already been cancelled.  Per the first communication in the ultrasound from central scheduling, PT DECLINED TO SCHEDULE MAMMOGRAM AT THIS TIME. UNABLE TO SCHEDULE JUST U/S PER PROTOCOL.

## 2024-12-04 NOTE — TELEPHONE ENCOUNTER
Reached patient at [phone]   Explained that she can not have just a breast ultrasound due to breast imaging protocol and has to have a diagnostic mammogram also.  She stated she had declined the diagnostic mammogram as when it was ordered before, her insurance would not cover it.  She stated she would just let it go but I asked her if she was willing to at least have the orders placed and scheduled and see what insurance says and she said ok but  if insurance won't cover it, she will just let it go and call us back if she gets worse    Can you place new orders?

## 2024-12-31 ENCOUNTER — OFFICE VISIT (OUTPATIENT)
Dept: INTERNAL MEDICINE | Facility: CLINIC | Age: 39
End: 2024-12-31
Payer: MEDICAID

## 2024-12-31 VITALS
RESPIRATION RATE: 18 BRPM | WEIGHT: 138 LBS | DIASTOLIC BLOOD PRESSURE: 70 MMHG | HEIGHT: 66 IN | SYSTOLIC BLOOD PRESSURE: 122 MMHG | BODY MASS INDEX: 22.18 KG/M2 | HEART RATE: 114 BPM | TEMPERATURE: 97.3 F

## 2024-12-31 DIAGNOSIS — R22.41 SKIN LUMP OF LEG, RIGHT: Primary | ICD-10-CM

## 2024-12-31 DIAGNOSIS — M79.604 RIGHT LEG PAIN: ICD-10-CM

## 2024-12-31 LAB — D DIMER PPP FEU-MCNC: 0.34 MCGFEU/ML (ref 0–0.5)

## 2024-12-31 PROCEDURE — 99214 OFFICE O/P EST MOD 30 MIN: CPT | Performed by: NURSE PRACTITIONER

## 2024-12-31 PROCEDURE — 85379 FIBRIN DEGRADATION QUANT: CPT | Performed by: NURSE PRACTITIONER

## 2024-12-31 PROCEDURE — 1160F RVW MEDS BY RX/DR IN RCRD: CPT | Performed by: NURSE PRACTITIONER

## 2024-12-31 PROCEDURE — 1125F AMNT PAIN NOTED PAIN PRSNT: CPT | Performed by: NURSE PRACTITIONER

## 2024-12-31 PROCEDURE — 1159F MED LIST DOCD IN RCRD: CPT | Performed by: NURSE PRACTITIONER

## 2024-12-31 NOTE — PROGRESS NOTES
"Chief Complaint  Mass (Right leg (since 24))    Subjective          Tasha Mckinney presents to [facility]  History of Present Illness  History of Present Illness  The patient is a 39-year-old female who presents for evaluation of blood clot concern.    She has a history of varicose veins, with a surgical intervention performed on her left leg in 2023. The right leg was also recommended for surgery, but she opted to monitor the condition. On , she experienced pain in her right leg, specifically in the area of the varicose veins. She noticed a quarter-sized spot that was red, warm, and bulging. She suspects it might be superficial phlebitis. She reports that the lumps have decreased in size but remain painful, particularly at night. She also mentions that the lump above the knee has resolved, but new significant lumps have developed below the knee. She experienced shooting pains when lying down, which disrupted her sleep, but this has improved. She can now turn her legs without difficulty. She reports pain in the calf when elevating her right leg and extending her toes towards the ceiling. Last week, she had difficulty kneeling due to pain in the middle of the knee, but this has since improved. She is currently 6 weeks pregnant and has been experiencing sinus congestion for the past month. She has been managing the symptoms by elevating her legs, wearing compression stockings, and applying heparin gel.    FAMILY HISTORY  Her maternal grandmother  of a deep vein thrombosis. Her mother has superficial varicose veins but has never had a blood clot.    MEDICATIONS  Current: Heparin gel    Objective   Vital Signs:   /70 (BP Location: Left arm, Patient Position: Sitting, Cuff Size: Adult)   Pulse 114   Temp 97.3 °F (36.3 °C) (Infrared)   Resp 18   Ht 167.6 cm (66\")   Wt 62.6 kg (138 lb)   BMI 22.27 kg/m²     Physical " Exam  Vitals and nursing note reviewed.   Constitutional:       General: She is not in acute distress.     Appearance: Normal appearance. She is well-developed. She is not ill-appearing.   HENT:      Head: Normocephalic and atraumatic.   Eyes:      General: No scleral icterus.  Neck:      Thyroid: No thyromegaly.   Cardiovascular:      Rate and Rhythm: Normal rate and regular rhythm.   Pulmonary:      Effort: Pulmonary effort is normal.      Breath sounds: Normal breath sounds.   Abdominal:      General: Bowel sounds are normal. There is no distension.      Palpations: Abdomen is soft.      Tenderness: There is no abdominal tenderness.   Musculoskeletal:      Comments: + homans sign  Pt has right leg medial /adjacent to the knee lumps noted 3 separate areas close together within the varicosity and are 1cm erythematous warm and painful to touch.  There is bruising noted above the area where an initial lump was located and no longer is present.    Lymphadenopathy:      Cervical: No cervical adenopathy.   Skin:     Capillary Refill: Capillary refill takes 2 to 3 seconds.      Coloration: Skin is not pale.   Neurological:      Mental Status: She is alert and oriented to person, place, and time.   Psychiatric:         Mood and Affect: Mood normal.         Behavior: Behavior normal.        Result Review :                 Assessment and Plan    Diagnoses and all orders for this visit:    1. Skin lump of leg, right (Primary)  -     Duplex Venous Lower Extremity - Right CAR; Future  -     D-dimer, Quantitative; Future  -     D-dimer, Quantitative    2. Right leg pain  -     Duplex Venous Lower Extremity - Right CAR; Future  -     D-dimer, Quantitative; Future  -     D-dimer, Quantitative      Assessment & Plan  1. Superficial thrombophlebitis.  Given her history of varicose veins, the presence of a superficial thrombophlebitis is highly probable. However, it is crucial to exclude the possibility of a deep vein thrombosis  (DVT), particularly in light of the observed bump and associated pain. The area exhibits slight redness and warmth, further necessitating this differential diagnosis. She has been advised to apply warm moist heat using a hand towel or dish towel, and to take anti-inflammatory medications such as ibuprofen or Advil. She has been informed about the availability of a coagulopathy panel test, which can identify genetic predispositions to blood clot development. She has been encouraged to consult with her insurance provider regarding coverage for this test. A D-dimer test will be conducted today, and she will be notified of the results within 4 to 6 hours. If the result is positive, she is advised to seek immediate medical attention at the hospital. She has been reassured that Lovenox is safe for use during pregnancy. An ultrasound of her legs will be ordered to confirm the presence of superficial varicosities. A prescription for Lovenox has been provided. Should her condition deteriorate, characterized by increased calf pain, chest pain, or shortness of breath, she is advised to visit the hospital.    PROCEDURE  The patient underwent surgical intervention for varicose veins in the left leg in 11/2023.        BMI is within normal parameters. No other follow-up for BMI required.  Addendum ultrasound negative for DVT.  Patient sent message to see if she would like to follow-up with surgery regarding varicosities.  Patient will continue to treat for thrombophlebitis and if symptoms worsen or do not improve she will let me know.        Follow Up   Return if symptoms worsen or fail to improve.  Patient was given instructions and counseling regarding her condition or for health maintenance advice. Please see specific information pulled into the AVS if appropriate.     RTC/call  If symptoms worsen  Meds MOA and SE's reviewed and pt v/u    12/31/24   11:37 EST    Patient or patient representative verbalized consent to the visit  recording.  I have personally performed the services described in this document as transcribed by the above individual, and it is both accurate and complete.

## 2025-01-02 ENCOUNTER — HOSPITAL ENCOUNTER (OUTPATIENT)
Dept: CARDIOLOGY | Facility: HOSPITAL | Age: 40
Discharge: HOME | End: 2025-01-02
Admitting: NURSE PRACTITIONER
Payer: MEDICAID

## 2025-01-02 DIAGNOSIS — M79.604 RIGHT LEG PAIN: ICD-10-CM

## 2025-01-02 DIAGNOSIS — R22.41 SKIN LUMP OF LEG, RIGHT: ICD-10-CM

## 2025-01-02 LAB
BH CV LOW VAS RIGHT VARICOSITY AK VESSEL: 1
BH CV LOWER VASCULAR RIGHT COMMON FEMORAL COMPRESS: NORMAL
BH CV LOWER VASCULAR RIGHT COMMON FEMORAL PHASIC: NORMAL
BH CV LOWER VASCULAR RIGHT COMMON FEMORAL SPONT: NORMAL
BH CV LOWER VASCULAR RIGHT DISTAL FEMORAL COMPRESS: NORMAL
BH CV LOWER VASCULAR RIGHT DISTAL FEMORAL PHASIC: NORMAL
BH CV LOWER VASCULAR RIGHT DISTAL FEMORAL SPONT: NORMAL
BH CV LOWER VASCULAR RIGHT GASTRONEMIUS COMPRESS: NORMAL
BH CV LOWER VASCULAR RIGHT GREATER SAPH AK COMPRESS: NORMAL
BH CV LOWER VASCULAR RIGHT GREATER SAPH BK COMPRESS: NORMAL
BH CV LOWER VASCULAR RIGHT LESSER SAPH COMPRESS: NORMAL
BH CV LOWER VASCULAR RIGHT MID FEMORAL COMPRESS: NORMAL
BH CV LOWER VASCULAR RIGHT MID FEMORAL PHASIC: NORMAL
BH CV LOWER VASCULAR RIGHT MID FEMORAL SPONT: NORMAL
BH CV LOWER VASCULAR RIGHT PERONEAL AUGMENT: NORMAL
BH CV LOWER VASCULAR RIGHT PERONEAL COMPRESS: NORMAL
BH CV LOWER VASCULAR RIGHT POPLITEAL COMPRESS: NORMAL
BH CV LOWER VASCULAR RIGHT POPLITEAL PHASIC: NORMAL
BH CV LOWER VASCULAR RIGHT POPLITEAL SPONT: NORMAL
BH CV LOWER VASCULAR RIGHT POSTERIOR TIBIAL AUGMENT: NORMAL
BH CV LOWER VASCULAR RIGHT POSTERIOR TIBIAL COMPRESS: NORMAL
BH CV LOWER VASCULAR RIGHT PROFUNDA FEMORAL PHASIC: NORMAL
BH CV LOWER VASCULAR RIGHT PROFUNDA FEMORAL SPONT: NORMAL
BH CV LOWER VASCULAR RIGHT PROXIMAL FEMORAL COMPRESS: NORMAL
BH CV LOWER VASCULAR RIGHT PROXIMAL FEMORAL PHASIC: NORMAL
BH CV LOWER VASCULAR RIGHT PROXIMAL FEMORAL SPONT: NORMAL
BH CV LOWER VASCULAR RIGHT SAPHENOFEMORAL JUNCTION COMPRESS: NORMAL
BH CV LOWER VASCULAR RIGHT SAPHENOFEMORAL JUNCTION PHASIC: NORMAL
BH CV LOWER VASCULAR RIGHT SAPHENOFEMORAL JUNCTION SPONT: NORMAL
BH CV LOWER VASCULAR RIGHT VARICOSITY AK AUGMENT: NORMAL
BH CV LOWER VASCULAR RIGHT VARICOSITY AK COMPRESS: NORMAL

## 2025-01-02 PROCEDURE — 93971 EXTREMITY STUDY: CPT

## 2025-01-08 ENCOUNTER — TELEPHONE (OUTPATIENT)
Dept: INTERNAL MEDICINE | Facility: CLINIC | Age: 40
End: 2025-01-08
Payer: MEDICAID

## 2025-01-08 NOTE — TELEPHONE ENCOUNTER
Please review unread MyChart message with patient.    I wanted to see how your symptoms are doing and if they have resolved. If your symptoms are persistent I would like for you to consider follow-up with vascular surgery. Please let me know. Thank you, Sandra

## 2025-01-09 NOTE — TELEPHONE ENCOUNTER
Patient stated that her symptoms have gotten better they are still there but much better. Patient would like to hold off on vascular surgery

## 2025-02-13 ENCOUNTER — LAB (OUTPATIENT)
Dept: LAB | Facility: HOSPITAL | Age: 40
End: 2025-02-13
Payer: MEDICAID

## 2025-02-13 ENCOUNTER — INITIAL PRENATAL (OUTPATIENT)
Dept: OBSTETRICS AND GYNECOLOGY | Facility: CLINIC | Age: 40
End: 2025-02-13
Payer: MEDICAID

## 2025-02-13 VITALS — DIASTOLIC BLOOD PRESSURE: 70 MMHG | WEIGHT: 136 LBS | SYSTOLIC BLOOD PRESSURE: 110 MMHG | BODY MASS INDEX: 21.95 KG/M2

## 2025-02-13 DIAGNOSIS — O09.91 HIGH-RISK PREGNANCY IN FIRST TRIMESTER: ICD-10-CM

## 2025-02-13 DIAGNOSIS — O09.299 HISTORY OF GESTATIONAL DIABETES IN PRIOR PREGNANCY, CURRENTLY PREGNANT: ICD-10-CM

## 2025-02-13 DIAGNOSIS — Z86.32 HISTORY OF GESTATIONAL DIABETES IN PRIOR PREGNANCY, CURRENTLY PREGNANT: ICD-10-CM

## 2025-02-13 DIAGNOSIS — O09.91 HIGH-RISK PREGNANCY IN FIRST TRIMESTER: Primary | ICD-10-CM

## 2025-02-13 DIAGNOSIS — O09.521 MULTIGRAVIDA OF ADVANCED MATERNAL AGE IN FIRST TRIMESTER: ICD-10-CM

## 2025-02-13 PROBLEM — O09.522 MULTIGRAVIDA OF ADVANCED MATERNAL AGE IN SECOND TRIMESTER: Status: ACTIVE | Noted: 2025-02-13

## 2025-02-13 PROBLEM — O09.92 HIGH-RISK PREGNANCY IN SECOND TRIMESTER: Status: ACTIVE | Noted: 2025-02-13

## 2025-02-13 LAB
ABO GROUP BLD: NORMAL
AMPHET+METHAMPHET UR QL: NEGATIVE
AMPHETAMINES UR QL: NEGATIVE
BARBITURATES UR QL SCN: NEGATIVE
BASOPHILS # BLD AUTO: 0.01 10*3/MM3 (ref 0–0.2)
BASOPHILS NFR BLD AUTO: 0.2 % (ref 0–1.5)
BENZODIAZ UR QL SCN: NEGATIVE
BLD GP AB SCN SERPL QL: NEGATIVE
BUPRENORPHINE SERPL-MCNC: NEGATIVE NG/ML
C TRACH RRNA SPEC DONR QL NAA+PROBE: NEGATIVE
CANNABINOIDS SERPL QL: NEGATIVE
COCAINE UR QL: NEGATIVE
DEPRECATED RDW RBC AUTO: 38.5 FL (ref 37–54)
EOSINOPHIL # BLD AUTO: 0.06 10*3/MM3 (ref 0–0.4)
EOSINOPHIL NFR BLD AUTO: 0.9 % (ref 0.3–6.2)
ERYTHROCYTE [DISTWIDTH] IN BLOOD BY AUTOMATED COUNT: 12.6 % (ref 12.3–15.4)
FENTANYL UR-MCNC: NEGATIVE NG/ML
HBA1C MFR BLD: 5.4 % (ref 4.8–5.6)
HBV SURFACE AG SERPL QL IA: NORMAL
HCT VFR BLD AUTO: 36.4 % (ref 34–46.6)
HCV AB SER QL: NORMAL
HGB BLD-MCNC: 12.6 G/DL (ref 12–15.9)
HIV 1+2 AB+HIV1 P24 AG SERPL QL IA: NORMAL
IMM GRANULOCYTES # BLD AUTO: 0.02 10*3/MM3 (ref 0–0.05)
IMM GRANULOCYTES NFR BLD AUTO: 0.3 % (ref 0–0.5)
LYMPHOCYTES # BLD AUTO: 1.74 10*3/MM3 (ref 0.7–3.1)
LYMPHOCYTES NFR BLD AUTO: 27 % (ref 19.6–45.3)
MCH RBC QN AUTO: 29.5 PG (ref 26.6–33)
MCHC RBC AUTO-ENTMCNC: 34.6 G/DL (ref 31.5–35.7)
MCV RBC AUTO: 85.2 FL (ref 79–97)
METHADONE UR QL SCN: NEGATIVE
MONOCYTES # BLD AUTO: 0.27 10*3/MM3 (ref 0.1–0.9)
MONOCYTES NFR BLD AUTO: 4.2 % (ref 5–12)
N GONORRHOEA DNA SPEC QL NAA+PROBE: NEGATIVE
NEUTROPHILS NFR BLD AUTO: 4.35 10*3/MM3 (ref 1.7–7)
NEUTROPHILS NFR BLD AUTO: 67.4 % (ref 42.7–76)
NRBC BLD AUTO-RTO: 0 /100 WBC (ref 0–0.2)
OPIATES UR QL: NEGATIVE
OXYCODONE UR QL SCN: NEGATIVE
PCP UR QL SCN: NEGATIVE
PLATELET # BLD AUTO: 252 10*3/MM3 (ref 140–450)
PMV BLD AUTO: 9.2 FL (ref 6–12)
RBC # BLD AUTO: 4.27 10*6/MM3 (ref 3.77–5.28)
RH BLD: POSITIVE
RPR SER QL: NORMAL
TRICYCLICS UR QL SCN: NEGATIVE
WBC NRBC COR # BLD AUTO: 6.45 10*3/MM3 (ref 3.4–10.8)

## 2025-02-13 PROCEDURE — 83036 HEMOGLOBIN GLYCOSYLATED A1C: CPT

## 2025-02-13 PROCEDURE — 87147 CULTURE TYPE IMMUNOLOGIC: CPT | Performed by: OBSTETRICS & GYNECOLOGY

## 2025-02-13 PROCEDURE — 87086 URINE CULTURE/COLONY COUNT: CPT | Performed by: OBSTETRICS & GYNECOLOGY

## 2025-02-13 PROCEDURE — 80307 DRUG TEST PRSMV CHEM ANLYZR: CPT | Performed by: OBSTETRICS & GYNECOLOGY

## 2025-02-13 PROCEDURE — 80081 OBSTETRIC PANEL INC HIV TSTG: CPT

## 2025-02-13 PROCEDURE — 86803 HEPATITIS C AB TEST: CPT

## 2025-02-13 RX ORDER — MULTIVIT,TX WITH IRON,MINERALS
250 TABLET, EXTENDED RELEASE ORAL
COMMUNITY
Start: 2025-01-01

## 2025-02-13 RX ORDER — MICONAZOLE NITRATE 2 %
CREAM WITH APPLICATOR VAGINAL
Qty: 45 G | Refills: 0 | Status: SHIPPED | OUTPATIENT
Start: 2025-02-13

## 2025-02-13 RX ORDER — METOCLOPRAMIDE 10 MG/1
10 TABLET ORAL EVERY 6 HOURS PRN
Qty: 20 TABLET | Refills: 1 | Status: SHIPPED | OUTPATIENT
Start: 2025-02-13

## 2025-02-13 RX ORDER — DIPHENHYDRAMINE HYDROCHLORIDE 25 MG/1
25 CAPSULE ORAL 2 TIMES DAILY PRN
Qty: 30 TABLET | Refills: 1 | Status: SHIPPED | OUTPATIENT
Start: 2025-02-13

## 2025-02-13 NOTE — PROGRESS NOTES
Subjective   Chief Complaint   Patient presents with    Initial Prenatal Visit     US done today       Tasha Mckinney is a 39 y.o. year old .  Patient's last menstrual period was 11/15/2024.  She presents to be seen to initiate prenatal care. Some nausea and GERD. She also reports having superficial clots in her left leg but ultrasound was negative for DVT.  She has questions about their she needs to take medications for this.    Social History    Tobacco Use      Smoking status: Never        Passive exposure: Never      Smokeless tobacco: Never      The following portions of the patient's history were reviewed and updated as appropriate:vital signs, allergies, current medications, past family history, past medical history, past social history, past surgical history, and problem list.    Objective   /70   Wt 61.7 kg (136 lb)   LMP 11/15/2024   BMI 21.95 kg/m²     General: well developed; well nourished  no acute distress  mentation appropriate   Skin: No suspicious lesions seen   Thyroid: normal to inspection and palpation   Heart:  Not performed.   Lungs: breathing is unlabored   Breasts:  Examined in supine position  Symmetric without masses or skin dimpling  Nipples normal without inversion, lesions or discharge  There are no palpable axillary nodes   Abdomen: soft, non-tender; no masses  no umbilical or inguinal hernias are present  no hepato-splenomegaly   Pelvis: Clinical staff was present for exam  External genitalia:  normal appearance of the external genitalia including Bartholin's and [place]'s glands.  :  urethral meatus normal; urethra normal:  Vaginal:  normal pink mucosa without prolapse or lesions.  Cervix:  normal appearance.  Uterus:  gravid ~ 10-12 weeks  Adnexa:  normal bimanual exam of the adnexa.  Rectal:  digital rectal exam not performed; anus visually normal appearing.       Lab Review   No data reviewed    Imaging   Pelvic ultrasound report    Assessment & Plan    ASSESSMENT  39 y.o. year old  at 12w6d confirmed by ultrasound today   Supervision of high risk pregnancy  AMA - declines testing  History of GDM in all prior pregnancies     PLAN  The problem list for pregnancy was initiated today  Tests ordered today:  Orders Placed This Encounter   Procedures    Chlamydia trachomatis, Neisseria gonorrhoeae, Trichomonas vaginalis, PCR - Swab, Cervix     Standing Status:   Future     Standing Expiration Date:   3/13/2025     Order Specific Question:   Release to patient     Answer:   Routine Release [[id number]]    Urine Culture - Urine, Urine, Clean Catch     Standing Status:   Future     Standing Expiration Date:   2026     Order Specific Question:   Release to patient     Answer:   Routine Release [[id number]]    OB Panel With HIV and RPR     Standing Status:   Future     Standing Expiration Date:   2026     Order Specific Question:   Release to patient     Answer:   Routine Release [[id number]]    Hepatitis C Antibody     Standing Status:   Future     Standing Expiration Date:   2026     Order Specific Question:   Release to patient     Answer:   Routine Release [[id number]]    Urine Drug Screen - Urine, Clean Catch     Please confirm all positive UDS     Standing Status:   Future     Standing Expiration Date:   2026     Order Specific Question:   Release to patient     Answer:   Routine Release [[id number]]    Hemoglobin A1c     Standing Status:   Future     Standing Expiration Date:   2026     Order Specific Question:   Release to patient     Answer:   Routine Release [[id number]]     Testing for GC / Chlamydia / trichomonas was done today  Pap was done today.  If she does not receive the results of the Pap within 2 weeks  time, she was instructed to call to find out the results.  I explained to Tasha that the recommendations for Pap smear interval in a low risk patient's has lengthened to 3 years time.  I encouraged her to be seen yearly for  a full physical exam including breast and pelvic exam even during the off years when PAP's will not be performed.  Genetic testing reviewed: NIPT ([facility]), carrier screening (SMA, CF), and she has considered the information and is not interested in having genetic testing performed.  Recommend starting ASA 81 mg given history of partial thrombophlebitis and gestational diabetes that she declines.  She may consider taking this later she reports.  Also recommended early Glucola but she declines and just wants to check her blood sugars later.  Will add A1c onto routine labs.  Information reviewed: exercise in pregnancy, nutrition in pregnancy, weight gain in pregnancy, work and travel restrictions during pregnancy, list of OTC medications acceptable in pregnancy, and call coverage groups  Recommend influenza vaccination at any gestational age - she declines.     Total time spent today with Tasha  was 30 minutes (level 4).  Of this time, > 50% was spent face-to-face time coordinating care, answering her questions and counseling regarding pathophysiology of her presenting problem along with plans for any diagnositc work-up and treatment.    Follow up: 4 week(s)       This note was electronically signed.    Marie Maradiaga MD  February 13, 2025

## 2025-02-14 DIAGNOSIS — N30.00 ACUTE CYSTITIS WITHOUT HEMATURIA: Primary | ICD-10-CM

## 2025-02-14 PROBLEM — O23.42 UTI IN PREGNANCY, ANTEPARTUM, SECOND TRIMESTER: Status: ACTIVE | Noted: 2025-02-14

## 2025-02-14 LAB
BACTERIA SPEC AEROBE CULT: ABNORMAL
BACTERIA SPEC AEROBE CULT: ABNORMAL
RUBV IGG SERPL IA-ACNC: 28.7 INDEX

## 2025-02-14 RX ORDER — AMPICILLIN 500 MG/1
500 CAPSULE ORAL 3 TIMES DAILY
Qty: 21 CAPSULE | Refills: 0 | Status: SHIPPED | OUTPATIENT
Start: 2025-02-14 | End: 2025-02-21

## 2025-02-17 DIAGNOSIS — N30.00 ACUTE CYSTITIS WITHOUT HEMATURIA: Primary | ICD-10-CM

## 2025-02-18 ENCOUNTER — LAB (OUTPATIENT)
Dept: LAB | Facility: HOSPITAL | Age: 40
End: 2025-02-18
Payer: MEDICAID

## 2025-02-18 DIAGNOSIS — N30.00 ACUTE CYSTITIS WITHOUT HEMATURIA: ICD-10-CM

## 2025-02-18 LAB — REF LAB TEST METHOD: NORMAL

## 2025-02-18 PROCEDURE — 87086 URINE CULTURE/COLONY COUNT: CPT

## 2025-02-19 ENCOUNTER — TELEPHONE (OUTPATIENT)
Dept: OBSTETRICS AND GYNECOLOGY | Facility: CLINIC | Age: 40
End: 2025-02-19
Payer: MEDICAID

## 2025-02-19 NOTE — TELEPHONE ENCOUNTER
Caller: Tasha Mckinney    Relationship to patient: Self    Best call back number: [phone]    Chief complaint: NEEDS 4-17 OB F/U R/S AS HER PDC APPT GOT R/S THAT DAY AS WELL, WANTS TO KNOW IF SHE CAN HAVE IT EARLIER W/DR. TURCIOS IF AT ALL POSSIBLE    Type of visit: OB F/U    Requested date: 4-17 (PDC APPT IS AT 9:30) EITHER LATER THAT DAY OR FRIDAY IF POSS     If rescheduling, when is the original appointment: 04-17     Additional notes:

## 2025-02-20 LAB — BACTERIA SPEC AEROBE CULT: NO GROWTH

## 2025-02-20 NOTE — TELEPHONE ENCOUNTER
Spoke with pt and advised her that we didn't have anything sooner, but she could come over right after her PDC appointment and we would work on getting her in and out.  Pt verbalized understanding and had no questions at this time.

## 2025-02-21 ENCOUNTER — DOCUMENTATION (OUTPATIENT)
Dept: OBSTETRICS AND GYNECOLOGY | Facility: CLINIC | Age: 40
End: 2025-02-21
Payer: MEDICAID

## 2025-03-13 ENCOUNTER — ROUTINE PRENATAL (OUTPATIENT)
Dept: OBSTETRICS AND GYNECOLOGY | Facility: CLINIC | Age: 40
End: 2025-03-13
Payer: MEDICAID

## 2025-03-13 VITALS — BODY MASS INDEX: 22.27 KG/M2 | DIASTOLIC BLOOD PRESSURE: 80 MMHG | SYSTOLIC BLOOD PRESSURE: 120 MMHG | WEIGHT: 138 LBS

## 2025-03-13 DIAGNOSIS — Z3A.16 16 WEEKS GESTATION OF PREGNANCY: Primary | ICD-10-CM

## 2025-03-13 NOTE — PROGRESS NOTES
Chief Complaint   Patient presents with    Routine Prenatal Visit     No c/c       HPI: Tasha is a  currently at 16w6d who today reports the following:  Contractions - No; Leaking - No; Vaginal bleeding -  No; Swelling of extremities - No.  She is struggling with insomnia and restless legs. She has tried magnesium but recently switched types.   ROS:  GI: Nausea - improved as compared to the prior visit; Constipation - No; Diarrhea - No    Neuro: Headache - No; Visual change - No      EXAM:  Vitals: See prenatal flowsheet   Abdomen: See prenatal flowsheet   Urine glucose/protein: See prenatal flowsheet   Pelvic: See prenatal flowsheet   MDM:   Impression: Supervision of high risk pregnancy  History of gestational diabetes (a1)  AMA - declines testing  Gbs + urine in pregnancy    Tests done today: none   Topics discussed: Continue with PNV's  Prenatal labs reviewed  Cussed GBS positive urine and need for treatment while in labor.  Patient verbalized understanding.  Discussed as needed use of Unisom for insomnia.  [place] this is the recommended treatment during pregnancy and is a safe medication in pregnancy especially if used as needed.  Discussed use of magnesium supplement for restless leg.  Notify provider if symptoms worsen or persist   Tests scheduled today for her next visit:   U/S for anatomic screening

## 2025-04-17 ENCOUNTER — ROUTINE PRENATAL (OUTPATIENT)
Dept: OBSTETRICS AND GYNECOLOGY | Facility: CLINIC | Age: 40
End: 2025-04-17
Payer: MEDICAID

## 2025-04-17 ENCOUNTER — HOSPITAL ENCOUNTER (OUTPATIENT)
Dept: WOMENS IMAGING | Facility: HOSPITAL | Age: 40
Discharge: HOME | End: 2025-04-17
Payer: MEDICAID

## 2025-04-17 ENCOUNTER — OFFICE VISIT (OUTPATIENT)
Dept: OBSTETRICS AND GYNECOLOGY | Facility: HOSPITAL | Age: 40
End: 2025-04-17
Payer: MEDICAID

## 2025-04-17 ENCOUNTER — LAB (OUTPATIENT)
Dept: LAB | Facility: HOSPITAL | Age: 40
End: 2025-04-17
Payer: MEDICAID

## 2025-04-17 VITALS — BODY MASS INDEX: 22.6 KG/M2 | DIASTOLIC BLOOD PRESSURE: 91 MMHG | SYSTOLIC BLOOD PRESSURE: 146 MMHG | WEIGHT: 140 LBS

## 2025-04-17 VITALS — WEIGHT: 140 LBS | BODY MASS INDEX: 22.6 KG/M2

## 2025-04-17 DIAGNOSIS — O26.812 PREGNANCY RELATED FATIGUE IN SECOND TRIMESTER: ICD-10-CM

## 2025-04-17 DIAGNOSIS — O09.522 MULTIGRAVIDA OF ADVANCED MATERNAL AGE IN SECOND TRIMESTER: Primary | ICD-10-CM

## 2025-04-17 DIAGNOSIS — O09.92 HIGH-RISK PREGNANCY IN SECOND TRIMESTER: Primary | ICD-10-CM

## 2025-04-17 DIAGNOSIS — O09.299 HISTORY OF GESTATIONAL DIABETES IN PRIOR PREGNANCY, CURRENTLY PREGNANT: ICD-10-CM

## 2025-04-17 DIAGNOSIS — Z86.32 HISTORY OF GESTATIONAL DIABETES IN PRIOR PREGNANCY, CURRENTLY PREGNANT: ICD-10-CM

## 2025-04-17 DIAGNOSIS — O09.521 MULTIGRAVIDA OF ADVANCED MATERNAL AGE IN FIRST TRIMESTER: ICD-10-CM

## 2025-04-17 DIAGNOSIS — Z34.90 PREGNANCY, UNSPECIFIED GESTATIONAL AGE: ICD-10-CM

## 2025-04-17 DIAGNOSIS — O09.91 HIGH-RISK PREGNANCY IN FIRST TRIMESTER: ICD-10-CM

## 2025-04-17 DIAGNOSIS — O16.2 ELEVATED BLOOD PRESSURE AFFECTING PREGNANCY IN SECOND TRIMESTER, ANTEPARTUM: ICD-10-CM

## 2025-04-17 DIAGNOSIS — O09.522 MULTIGRAVIDA OF ADVANCED MATERNAL AGE IN SECOND TRIMESTER: ICD-10-CM

## 2025-04-17 PROBLEM — Z22.330 GBS CARRIER: Status: ACTIVE | Noted: 2025-04-17

## 2025-04-17 LAB
ALP SERPL-CCNC: 72 U/L (ref 39–117)
ALT SERPL W P-5'-P-CCNC: 15 U/L (ref 1–33)
AST SERPL-CCNC: 17 U/L (ref 1–32)
BILIRUB SERPL-MCNC: 0.2 MG/DL (ref 0–1.2)
CREAT SERPL-MCNC: 0.68 MG/DL (ref 0.57–1)
CREAT UR-MCNC: 41 MG/DL
DEPRECATED RDW RBC AUTO: 42.8 FL (ref 37–54)
ERYTHROCYTE [DISTWIDTH] IN BLOOD BY AUTOMATED COUNT: 13.2 % (ref 12.3–15.4)
FERRITIN SERPL-MCNC: 11.9 NG/ML (ref 13–150)
GLUCOSE UR STRIP-MCNC: NEGATIVE MG/DL
HCT VFR BLD AUTO: 35.3 % (ref 34–46.6)
HGB BLD-MCNC: 11.7 G/DL (ref 12–15.9)
IRON 24H UR-MRATE: 137 MCG/DL (ref 37–145)
IRON SATN MFR SERPL: 21 % (ref 20–50)
LDH SERPL-CCNC: 181 U/L (ref 135–214)
MCH RBC QN AUTO: 29.5 PG (ref 26.6–33)
MCHC RBC AUTO-ENTMCNC: 33.1 G/DL (ref 31.5–35.7)
MCV RBC AUTO: 88.9 FL (ref 79–97)
PLATELET # BLD AUTO: 228 10*3/MM3 (ref 140–450)
PMV BLD AUTO: 9.1 FL (ref 6–12)
PROT ?TM UR-MCNC: <4 MG/DL
PROT UR STRIP-MCNC: NEGATIVE MG/DL
PROT/CREAT UR: NORMAL MG/G{CREAT}
RBC # BLD AUTO: 3.97 10*6/MM3 (ref 3.77–5.28)
TIBC SERPL-MCNC: 650 MCG/DL (ref 298–536)
TRANSFERRIN SERPL-MCNC: 436 MG/DL (ref 200–360)
URATE SERPL-MCNC: 4.7 MG/DL (ref 2.4–5.7)
WBC NRBC COR # BLD AUTO: 9.1 10*3/MM3 (ref 3.4–10.8)

## 2025-04-17 PROCEDURE — 84466 ASSAY OF TRANSFERRIN: CPT

## 2025-04-17 PROCEDURE — 76811 OB US DETAILED SNGL FETUS: CPT

## 2025-04-17 PROCEDURE — 82570 ASSAY OF URINE CREATININE: CPT | Performed by: OBSTETRICS & GYNECOLOGY

## 2025-04-17 PROCEDURE — 84460 ALANINE AMINO (ALT) (SGPT): CPT

## 2025-04-17 PROCEDURE — 82247 BILIRUBIN TOTAL: CPT

## 2025-04-17 PROCEDURE — 82565 ASSAY OF CREATININE: CPT

## 2025-04-17 PROCEDURE — 84156 ASSAY OF PROTEIN URINE: CPT | Performed by: OBSTETRICS & GYNECOLOGY

## 2025-04-17 PROCEDURE — 84550 ASSAY OF BLOOD/URIC ACID: CPT

## 2025-04-17 PROCEDURE — 83540 ASSAY OF IRON: CPT

## 2025-04-17 PROCEDURE — 84450 TRANSFERASE (AST) (SGOT): CPT

## 2025-04-17 PROCEDURE — 84075 ASSAY ALKALINE PHOSPHATASE: CPT

## 2025-04-17 PROCEDURE — 83615 LACTATE (LD) (LDH) ENZYME: CPT

## 2025-04-17 PROCEDURE — 82728 ASSAY OF FERRITIN: CPT

## 2025-04-17 PROCEDURE — 85027 COMPLETE CBC AUTOMATED: CPT

## 2025-04-17 RX ORDER — BLOOD-GLUCOSE METER
1 KIT MISCELLANEOUS AS NEEDED
Qty: 1 EACH | Refills: 0 | Status: SHIPPED | OUTPATIENT
Start: 2025-04-17

## 2025-04-17 RX ORDER — LANCETS 28 GAUGE
EACH MISCELLANEOUS
Qty: 120 EACH | Refills: 12 | Status: SHIPPED | OUTPATIENT
Start: 2025-04-17

## 2025-04-17 NOTE — ASSESSMENT & PLAN NOTE
The patient will be 40 years old at the time of delivery.  She was quoted the following age-related risks at term:  Risk for Down syndrome 1 in 140, risk for any chromosomal abnormality 1 in 80.  Options in genetic testing and genetic screening were discussed with the patient.  I noted an option of genetic testing in the 2nd trimester of transabdominal amniocentesis for the determination of fetal chromosomal complement as well as amniotic fluid alpha-fetoprotein for the evaluation of a fetal open neural tube defect.  A procedure-related fetal loss rate of 1 in 500 would be quoted with midtrimester amniocentesis.  I also discussed the option of analysis of cell-free fetal DNA in maternal blood.  I noted this directed analysis measures the relative proportion of chromosomes with the detection rate of fetal Down syndrome quoted as 99% with a false positive rate of less than .1%.  Screening for other fetal aneuploidies is also possible but the detection rate is lower with cell-free fetal DNA technology.  I then discussed the clinical utility of ultrasound and/or biochemical screening for the detection of fetal aneuploidy as well as fetal open neural tube defects.  Further, I have reviewed her self-reported family history and made suggestions as appropriate based on my review.      We discussed that this significantly lowers the risk chromosomal abnormalities but does not eliminate risk.  Amniocentesis was again discussed.  The procedure was explained and the risks including risks of pregnancy loss were outlined.  After careful consideration the patient declines amniocentesis.  We also discussed cell free DNA screening.  The procedure was explained the risks and the accuracy of the testing were discussed.  After careful consideration patient declines cell free DNA testing as well    We will rescan the patient again at 32 weeks gestation look for late findings of trisomy as well as to assess fetal growth.

## 2025-04-17 NOTE — PROGRESS NOTES
Documentation of the ultrasound findings, images, and interpretations will be available in the patient's Viewpoint report which is located in the imaging tab in chart review.    Maternal/Fetal Medicine Consult Note     Name: Tasha Mckinney    : 1985     MRN: [id number]     Referring Provider: Marie Maradiaga MD    Chief Complaint  AMA; Hx GDM; HTN    Subjective     History of Present Illness:  Tasha Mckinney is a 40 y.o.  21w6d who presents today for AMA    JULY: Estimated Date of Delivery: 25     ROS:   As noted in HPI.     Past Medical History:   Diagnosis Date    Easy bruising     Ectopic pregnancy     Frequent nosebleeds     GERD (gastroesophageal reflux disease)     Gestational diabetes 2010    Gestational diabetes 2014    Gestational diabetes 2017    Headache     History of kidney stones 2014    History of medical problems     Tachycardia, varicose veins, chronic sinusitis    Hyperlipidemia     Hypertension     Elevated bp    Kidney stone  and in     Liver disease     Fatty liver    Migraine     Ovarian cyst 2008    PONV (postoperative nausea and vomiting)     Recurrent pregnancy loss, antepartum condition or complication ,     Tachycardia in pregnancy 2010    Never treated    Varicella Childhood    Vitamin D deficiency       Past Surgical History:   Procedure Laterality Date    LASER ABLATION      VERICOSE VEIN ABLATION      OB History          7    Para   4    Term   4            AB   2    Living   4         SAB   2    IAB        Ectopic        Molar        Multiple   0    Live Births   4          Obstetric Comments    - Mario   - Lee Gutierrez - Cornelia   - Preet  History of GDMA1 - all pregnancies   No history of abnormal pap smears   No history of STIs               Objective     Vital Signs  /91   Wt 63.5 kg (140 lb)   LMP 11/15/2024   Estimated body mass index is 22.6 kg/m² as calculated from the  "following:    Height as of 24: 167.6 cm (66\").    Weight as of this encounter: 63.5 kg (140 lb).    Physical Exam    Ultrasound Impression:   See Viewpoint    Assessment and Plan     Tasha Mckinney is a 40 y.o.  21w6d who presents today for AMA patient is already had a cell free DNA screen that returned as low risk.    Diagnoses and all orders for this visit:    1. Multigravida of advanced maternal age in second trimester (Primary)  Assessment & Plan:  The patient will be 40 years old at the time of delivery.  She was quoted the following age-related risks at term:  Risk for Down syndrome 1 in 140, risk for any chromosomal abnormality 1 in 80.  Options in genetic testing and genetic screening were discussed with the patient.  I noted an option of genetic testing in the 2nd trimester of transabdominal amniocentesis for the determination of fetal chromosomal complement as well as amniotic fluid alpha-fetoprotein for the evaluation of a fetal open neural tube defect.  A procedure-related fetal loss rate of 1 in 500 would be quoted with midtrimester amniocentesis.  I also discussed the option of analysis of cell-free fetal DNA in maternal blood.  I noted this directed analysis measures the relative proportion of chromosomes with the detection rate of fetal Down syndrome quoted as 99% with a false positive rate of less than .1%.  Screening for other fetal aneuploidies is also possible but the detection rate is lower with cell-free fetal DNA technology.  I then discussed the clinical utility of ultrasound and/or biochemical screening for the detection of fetal aneuploidy as well as fetal open neural tube defects.  Further, I have reviewed her self-reported family history and made suggestions as appropriate based on my review.      We discussed that this significantly lowers the risk chromosomal abnormalities but does not eliminate risk.  Amniocentesis was again discussed.  The procedure was " explained and the risks including risks of pregnancy loss were outlined.  After careful consideration the patient declines amniocentesis.  We also discussed cell free DNA screening.  The procedure was explained the risks and the accuracy of the testing were discussed.  After careful consideration patient declines cell free DNA testing as well    We will rescan the patient again at 32 weeks gestation look for late findings of trisomy as well as to assess fetal growth.      2. History of gestational diabetes in prior pregnancy, currently pregnant - [ ] follow GCT versus checking sugars    3. Pregnancy, unspecified gestational age         Follow Up  No follow-ups on file.    I spent 15 minutes caring for the patient on the day of service. This included: obtaining or reviewing a separately obtained medical history, reviewing patient records, performing a medically appropriate exam and/or evaluation, counseling or educating the patient/family/caregiver, ordering medications, labs, and/or procedures and documenting such in the medical record. This does not include time spent on review and interpretation of other tests such as fetal ultrasound or the performance of other procedures such as amniocentesis or CVS.      Douglas A. Milligan, MD  Maternal Fetal Medicine, [facility]    [facility]     2025

## 2025-04-17 NOTE — PROGRESS NOTES
Patient denies any leaking fluid, vaginal bleeding, or contractions.  NIPT declined.  Patient reports next follow-up appointment with Dr. Maradiaga's office is today.

## 2025-04-17 NOTE — LETTER
2025     Marie Maradiaga MD  [address] [facility]  [address] [place] [address]    Patient: Tasha Mckinney   YOB: 1985   Date of Visit: 2025     Dear Marie Maradiaga MD:       Thank you for referring Tasha Mckinney to me for evaluation. Below are the relevant portions of my assessment and plan of care.    If you have questions, please do not hesitate to call me. I look forward to following Tasha along with you.         Sincerely,        Douglas A. Milligan, MD        CC: No Recipients    Milligan, Douglas A, MD  [phone]  Sign when Signing Visit  Documentation of the ultrasound findings, images, and interpretations will be available in the patient's Viewpoint report which is located in the imaging tab in chart review.    Maternal/Fetal Medicine Consult Note     Name: Tasha Mckinney    : 1985     MRN: [id number]     Referring Provider: Marie Maradiaga MD    Chief Complaint  AMA; Hx GDM; HTN    Subjective     History of Present Illness:  Tasha Mckinney is a 40 y.o.  21w6d who presents today for AMA    JULY: Estimated Date of Delivery: 25     ROS:   As noted in HPI.     Past Medical History:   Diagnosis Date   • Easy bruising    • Ectopic pregnancy    • Frequent nosebleeds    • GERD (gastroesophageal reflux disease)    • Gestational diabetes    • Gestational diabetes 2014   • Gestational diabetes 2017   • Headache    • History of kidney stones    • History of medical problems     Tachycardia, varicose veins, chronic sinusitis   • Hyperlipidemia    • Hypertension     Elevated bp   • Kidney stone  and in    • Liver disease     Fatty liver   • Migraine    • Ovarian cyst    • PONV (postoperative nausea and vomiting)    • Recurrent pregnancy loss, antepartum condition or complication , 2016   • Tachycardia in pregnancy 2010    Never treated   • Varicella Childhood   • Vitamin D deficiency       Past  "Surgical History:   Procedure Laterality Date   • LASER ABLATION      VERICOSE VEIN ABLATION      OB History          7    Para   4    Term   4            AB   2    Living   4         SAB   2    IAB        Ectopic        Molar        Multiple   0    Live Births   4          Obstetric Comments    - Mario   - Lee Gutierrez - Cornelia   - Preet  History of GDMA1 - all pregnancies   No history of abnormal pap smears   No history of STIs               Objective     Vital Signs  /91   Wt 63.5 kg (140 lb)   LMP 11/15/2024   Estimated body mass index is 22.6 kg/m² as calculated from the following:    Height as of 24: 167.6 cm (66\").    Weight as of this encounter: 63.5 kg (140 lb).    Physical Exam    Ultrasound Impression:   See Viewpoint    Assessment and Plan     Tasha Mckinney is a 40 y.o.  21w6d who presents today for AMA patient is already had a cell free DNA screen that returned as low risk.    Diagnoses and all orders for this visit:    1. Multigravida of advanced maternal age in second trimester (Primary)  Assessment & Plan:  The patient will be 40 years old at the time of delivery.  She was quoted the following age-related risks at term:  Risk for Down syndrome 1 in 140, risk for any chromosomal abnormality 1 in 80.  Options in genetic testing and genetic screening were discussed with the patient.  I noted an option of genetic testing in the 2nd trimester of transabdominal amniocentesis for the determination of fetal chromosomal complement as well as amniotic fluid alpha-fetoprotein for the evaluation of a fetal open neural tube defect.  A procedure-related fetal loss rate of 1 in 500 would be quoted with midtrimester amniocentesis.  I also discussed the option of analysis of cell-free fetal DNA in maternal blood.  I noted this directed analysis measures the relative proportion of chromosomes with the detection rate of fetal Down syndrome quoted as 99% with " a false positive rate of less than .1%.  Screening for other fetal aneuploidies is also possible but the detection rate is lower with cell-free fetal DNA technology.  I then discussed the clinical utility of ultrasound and/or biochemical screening for the detection of fetal aneuploidy as well as fetal open neural tube defects.  Further, I have reviewed her self-reported family history and made suggestions as appropriate based on my review.      We discussed that this significantly lowers the risk chromosomal abnormalities but does not eliminate risk.  Amniocentesis was again discussed.  The procedure was explained and the risks including risks of pregnancy loss were outlined.  After careful consideration the patient declines amniocentesis.  We also discussed cell free DNA screening.  The procedure was explained the risks and the accuracy of the testing were discussed.  After careful consideration patient declines cell free DNA testing as well    We will rescan the patient again at 32 weeks gestation look for late findings of trisomy as well as to assess fetal growth.      2. History of gestational diabetes in prior pregnancy, currently pregnant - [ ] follow GCT versus checking sugars    3. Pregnancy, unspecified gestational age         Follow Up  No follow-ups on file.    I spent 15 minutes caring for the patient on the day of service. This included: obtaining or reviewing a separately obtained medical history, reviewing patient records, performing a medically appropriate exam and/or evaluation, counseling or educating the patient/family/caregiver, ordering medications, labs, and/or procedures and documenting such in the medical record. This does not include time spent on review and interpretation of other tests such as fetal ultrasound or the performance of other procedures such as amniocentesis or CVS.      Douglas A. Milligan, MD  Maternal Fetal Medicine, [facility] [place]      04/17/2025

## 2025-04-17 NOTE — PROGRESS NOTES
Chief Complaint   Patient presents with    Routine Prenatal Visit     US done today at [facility]       HPI: Tasha is a  currently at 21w6d who today reports the following:  Contractions - No; Leaking - No; Vaginal bleeding -  No; Heartburn - No.    Patient reports she was concerned about blood pressures prior to pregnancy and that they would sometimes being anywhere from up to 140s over 90s but more recently during her pregnancy and even this past week they have been 1 16-1 20s over 80s.  Today she had 2 mild range blood pressures while at [facility].    She does not want to complete a 1 hour at any point during the pregnancy but will check her blood sugars if I send supplies to her pharmacy.     Questions about a supplement with liquid iron if it is safe to take and also has questions about fatigue.    She also is requesting a rx for compression stockings.     ROS:  GI: Nausea - No ; Constipation - No; Diarrhea - No    Neuro: Headache - No ; Visual change - No      EXAM:  Vitals: See prenatal flowsheet   Abdomen: See prenatal flowsheet   Urine glucose/protein: See prenatal flowsheet   Pelvic: See prenatal flowsheet     Lab Results   Component Value Date    ABORH O Rh Positive 2014    ABO O 2025    RH Positive 2025    LABANTI Negative 2014    ABSCRN Negative 2025       MDM:  Impression: Supervision of high risk pregnancy  History of gestational diabetes A1  AMA - declines testing  Gbs + urine in pregnancy - repeat negative    Tests done today: Urine dip for protein and glucose  U/S for anatomic screening - U/S report is not available for review at this time   Topics discussed: Continue with PNV's  Prenatal labs reviewed  Will check CBC, iron profile, ferritin and add on PEP and UPCR given elevated blood pressure reading. Recommend baseline 24 hour urine protein collection and reviewed BP parameters for calling the office.   Rx for compression stockings areoflow sent   Reviewed to avoid the  Last UA, A1C, Uric Acid, CBC, CMP, FLP, PSA done 3/14/19  Please advise.     supplement she showed me pictures of.   Glucose supplies sent to pharmacy to start check blood sugars.    Tests scheduled today for her next visit:   none     Orders Placed This Encounter   Procedures    CBC (No Diff)     Standing Status:   Future     Expected Date:   5/17/2025     Expiration Date:   4/17/2026     Release to patient:   Routine Release [[id number]]    Preeclampsia Panel     Standing Status:   Future     Expected Date:   4/22/2025     Expiration Date:   4/17/2026     Release to patient:   Routine Release [[id number]]    Protein, Urine, 24 Hour - Urine, Clean Catch     Standing Status:   Future     Expected Date:   4/20/2025     Expiration Date:   6/16/2025     Release to patient:   Routine Release [[id number]]    Protein / Creatinine Ratio, Urine - Urine, Clean Catch     Standing Status:   Future     Expected Date:   4/17/2025     Expiration Date:   7/17/2026     Release to patient:   Routine Release [[id number]]    Ferritin     Standing Status:   Future     Expected Date:   4/22/2025     Expiration Date:   7/17/2026     Release to patient:   Routine Release [[id number]]    Iron Profile     Standing Status:   Future     Expected Date:   4/22/2025     Expiration Date:   7/17/2026     Release to patient:   Routine Release [[id number]]     New Medications Ordered This Visit   Medications    glucose blood test strip     Sig: Please take your blood sugar fasting (goal <95) and 2 hours after each meal (goal <120) and record.     Dispense:  120 each     Refill:  12    Lancets (freestyle) lancets     Sig: Please take your blood sugar fasting (goal <95) and 2 hours after each meal (goal <120) and record.     Dispense:  120 each     Refill:  12    glucose monitor monitoring kit     Sig: Use 1 each As Needed (see below). Please check your sugar fasting (goal <95) and 2 hours after each meal (goal <120)     Dispense:  1 each     Refill:  0

## 2025-04-23 ENCOUNTER — RESULTS FOLLOW-UP (OUTPATIENT)
Dept: OBSTETRICS AND GYNECOLOGY | Facility: CLINIC | Age: 40
End: 2025-04-23
Payer: MEDICAID

## 2025-05-02 ENCOUNTER — OFFICE VISIT (OUTPATIENT)
Dept: INTERNAL MEDICINE | Facility: CLINIC | Age: 40
End: 2025-05-02
Payer: MEDICAID

## 2025-05-02 VITALS
HEIGHT: 66 IN | TEMPERATURE: 97.4 F | RESPIRATION RATE: 18 BRPM | HEART RATE: 112 BPM | DIASTOLIC BLOOD PRESSURE: 68 MMHG | WEIGHT: 140 LBS | OXYGEN SATURATION: 99 % | BODY MASS INDEX: 22.5 KG/M2 | SYSTOLIC BLOOD PRESSURE: 124 MMHG

## 2025-05-02 DIAGNOSIS — Z3A.24 24 WEEKS GESTATION OF PREGNANCY: ICD-10-CM

## 2025-05-02 DIAGNOSIS — E55.9 VITAMIN D DEFICIENCY: ICD-10-CM

## 2025-05-02 DIAGNOSIS — I83.93 ASYMPTOMATIC SUPERFICIAL VARICOSITIES OF LOWER EXTREMITY, BILATERAL: ICD-10-CM

## 2025-05-02 DIAGNOSIS — Z13.29 THYROID DISORDER SCREEN: ICD-10-CM

## 2025-05-02 DIAGNOSIS — Z00.00 ANNUAL PHYSICAL EXAM: Primary | ICD-10-CM

## 2025-05-02 PROCEDURE — 82306 VITAMIN D 25 HYDROXY: CPT | Performed by: NURSE PRACTITIONER

## 2025-05-02 PROCEDURE — 84443 ASSAY THYROID STIM HORMONE: CPT | Performed by: NURSE PRACTITIONER

## 2025-05-02 NOTE — PATIENT INSTRUCTIONS
MyPlate from USDA    MyPlate is an outline of a general healthy diet based on the Dietary Guidelines for Americans, 4686-5762, from the U.S. Department of Agriculture (USDA). It sets guidelines for how much food you should eat from each food group based on your age, sex, and level of physical activity.  What are tips for following MyPlate?  To follow MyPlate recommendations:  Eat a wide variety of fruits and vegetables, grains, and protein foods.  Serve smaller portions and eat less food throughout the day.  Limit portion sizes to avoid overeating.  Enjoy your food.  Get at least 150 minutes of exercise every week. This is about 30 minutes each day, 5 or more days per week.  It can be difficult to have every meal look like MyPlate. Think about MyPlate as eating guidelines for an entire day, rather than each individual meal.  Fruits and vegetables  Make one half of your plate fruits and vegetables.  Eat many different colors of fruits and vegetables each day.  For a 2,000-calorie daily food plan, eat:  2½ cups of vegetables every day.  2 cups of fruit every day.  1 cup is equal to:  1 cup raw or cooked vegetables.  1 cup raw fruit.  1 medium-sized orange, apple, or banana.  1 cup 100% fruit or vegetable juice.  2 cups raw leafy greens, such as lettuce, spinach, or kale.  ½ cup dried fruit.  Grains  One fourth of your plate should be grains.  Make at least half of the grains you eat each day whole grains.  For a 2,000-calorie daily food plan, eat 6 oz of grains every day.  1 oz is equal to:  1 slice bread.  1 cup cereal.  ½ cup cooked rice, cereal, or pasta.  Protein  One fourth of your plate should be protein.  Eat a wide variety of protein foods, including meat, poultry, fish, eggs, beans, nuts, and tofu.  For a 2,000-calorie daily food plan, eat 5½ oz of protein every day.  1 oz is equal to:  1 oz meat, poultry, or fish.  ¼ cup cooked beans.  1 egg.  ½ oz nuts or seeds.  1 Tbsp peanut butter.  Dairy  Drink fat-free  or low-fat (1%) milk.  Eat or drink dairy as a side to meals.  For a 2,000-calorie daily food plan, eat or drink 3 cups of dairy every day.  1 cup is equal to:  1 cup milk, yogurt, cottage cheese, or soy milk (soy beverage).  2 oz processed cheese.  1½ oz natural cheese.  Fats, oils, salt, and sugars  Only small amounts of oils are recommended.  Avoid foods that are high in calories and low in nutritional value (empty calories), like foods high in fat or added sugars.  Choose foods that are low in salt (sodium). Choose foods that have less than 140 milligrams (mg) of sodium per serving.  Drink water instead of sugary drinks. Drink enough fluid to keep your urine pale yellow.  Where to find support  Work with your health care provider or a dietitian to develop a customized eating plan that is right for you.  Download an joel (mobile application) to help you track your daily food intake.  Where to find more information  USDA: ChooseMyPlate.gov  Summary  MyPlate is a general guideline for healthy eating from the USDA. It is based on the Dietary Guidelines for Americans, 9376-0993.  In general, fruits and vegetables should take up one half of your plate, grains should take up one fourth of your plate, and protein should take up one fourth of your plate.  This information is not intended to replace advice given to you by your health care provider. Make sure you discuss any questions you have with your health care provider.  Document Revised: 11/08/2021 Document Reviewed: 11/08/2021  [employer] Patient Education © 2024 [employer]  Advance Care Planning and Advance Directives     You make decisions on a daily basis - decisions about where you want to live, your career, your home, your life. Perhaps one of the most important decisions you face is your choice for future medical care. Take time to talk with your family and your healthcare team and start planning today.  Advance Care Planning is a process that can help  you:  Understand possible future healthcare decisions in light of your own experiences  Reflect on those decision in light of your goals and values  Discuss your decisions with those closest to you and the healthcare professionals that care for you  Make a plan by creating a document that reflects your wishes    Surrogate Decision Maker  In the event of a medical emergency, which has left you unable to communicate or to make your own decisions, you would need someone to make decisions for you.  It is important to discuss your preferences for medical treatment with this person while you are in good health.     Qualities of a surrogate decision maker:  Willing to take on this role and responsibility  Knows what you want for future medical care  Willing to follow your wishes even if they don't agree with them  Able to make difficult medical decisions under stressful circumstances    Advance Directives  These are legal documents you can create that will guide your healthcare team and decision maker(s) when needed. These documents can be stored in the electronic medical record.    Living Will - a legal document to guide your care if you have a terminal condition or a serious illness and are unable to communicate. States vary by statute in document names/types, but most forms may include one or more of the following:        -  Directions regarding life-prolonging treatments        -  Directions regarding artificially provided nutrition/hydration        -  Choosing a healthcare decision maker        -  Direction regarding organ/tissue donation    Durable Power of  for Healthcare - this document names an -in-fact to make medical decisions for you, but it may also allow this person to make personal and financial decisions for you. Please seek the advice of an  if you need this type of document.    **Advance Directives are not required and no one may discriminate against you if you do not sign  one.    Medical Orders  Many states allow specific forms/orders signed by your physician to record your wishes for medical treatment in your current state of health. This form, signed in personal communication with your physician, addresses resuscitation and other medical interventions that you may or may not want.      For more information or to schedule a time with a [facility] Advance Care Planning Facilitator contact: [facility]/ACP or call [phone] and someone will contact you directly.

## 2025-05-02 NOTE — PROGRESS NOTES
"Chief Complaint  Annual Exam and Edema (BLE edema)    Subjective          Tasha Mckinney presents to [facility]  History of Present Illness  History of Present Illness  The patient presents for a physical exam.    She is currently 24 weeks pregnant and has been experiencing fluctuations in her blood pressure over the past year, which she attributes to her pregnancy. Blood pressure readings have been variable, but the most recent reading was within normal limits. She has been managing this with magnesium supplements, which were not prescribed by her obstetrician.     Severe nausea has been reported, for which Unisom is taken. Omeprazole is used as needed, and prenatal vitamins are taken regularly. No new onset of headaches, dizziness, syncope, chest pain, dyspnea, or hematuria is reported, although occasional episodes of dizziness are noted. Blood glucose levels are monitored at home due to gestational diabetes, and dietary modifications have been made accordingly.    Compression socks are used for leg swelling and varicosities, but they have been causing bruising. Difficulty obtaining new compression socks through insurance has been encountered, and a prescription is requested. A history of leg surgery is noted, with advice given that varicosities may recur.     Recent blood work in 04/2025 indicated slightly low ferritin levels, but no preeclampsia was detected. The patient is taking vitamin D supplements and is interested in having her vitamin D levels checked. Fatigue is reported, though it is uncertain if it is related to the pregnancy.    PAST SURGICAL HISTORY:  - Leg surgery (details unspecified)    Objective   Vital Signs:   /68 (BP Location: Right arm, Patient Position: Sitting, Cuff Size: Adult)   Pulse 112   Temp 97.4 °F (36.3 °C) (Infrared)   Resp 18   Ht 167.6 cm (65.98\")   Wt 63.5 kg (140 lb)   SpO2 99%   BMI 22.61 kg/m²   "   Physical Exam  Vitals and nursing note reviewed.   Constitutional:       General: She is not in acute distress.     Appearance: Normal appearance. She is well-developed. She is not ill-appearing.   HENT:      Head: Normocephalic and atraumatic.      Right Ear: Tympanic membrane and external ear normal.      Left Ear: Tympanic membrane and external ear normal.      Nose: Nose normal.      Mouth/Throat:      Mouth: Mucous membranes are moist.      Pharynx: Oropharynx is clear. No oropharyngeal exudate or posterior oropharyngeal erythema.   Eyes:      General: No scleral icterus.        Right eye: No discharge.         Left eye: No discharge.      Conjunctiva/sclera: Conjunctivae normal.      Pupils: Pupils are equal, round, and reactive to light.   Neck:      Thyroid: No thyromegaly.      Vascular: No carotid bruit.   Cardiovascular:      Rate and Rhythm: Normal rate and regular rhythm.      Heart sounds: Normal heart sounds. No murmur heard.     No friction rub. No gallop.   Pulmonary:      Effort: Pulmonary effort is normal.      Breath sounds: Normal breath sounds.   Abdominal:      General: Bowel sounds are normal. There is no distension.      Palpations: Abdomen is soft. There is no mass.      Tenderness: There is no abdominal tenderness. There is no guarding or rebound.      Hernia: No hernia is present.   Musculoskeletal:         General: Normal range of motion.      Cervical back: Normal range of motion and neck supple.   Lymphadenopathy:      Head:      Right side of head: No submental, submandibular, tonsillar, preauricular, posterior auricular or occipital adenopathy.      Left side of head: No submental, submandibular, tonsillar, preauricular, posterior auricular or occipital adenopathy.      Cervical: No cervical adenopathy.      Right cervical: No superficial, deep or posterior cervical adenopathy.     Left cervical: No superficial, deep or posterior cervical adenopathy.      Upper Body:      Right  upper body: No supraclavicular, axillary, pectoral or epitrochlear adenopathy.      Left upper body: No supraclavicular, axillary, pectoral or epitrochlear adenopathy.      Lower Body: No right inguinal adenopathy. No left inguinal adenopathy.   Skin:     General: Skin is warm and dry.      Capillary Refill: Capillary refill takes 2 to 3 seconds.   Neurological:      Mental Status: She is alert and oriented to person, place, and time.      Deep Tendon Reflexes: Reflexes normal.   Psychiatric:         Behavior: Behavior normal.         Thought Content: Thought content normal.         Judgment: Judgment normal.        Result Review :                 Assessment and Plan    Diagnoses and all orders for this visit:    1. Annual physical exam (Primary)    2. 24 weeks gestation of pregnancy  -     Miscellaneous DME    3. Asymptomatic superficial varicosities of lower extremity, bilateral  -     Miscellaneous DME    4. Vitamin D deficiency      Assessment & Plan  1. Pregnancy at 24 weeks gestation.  - Blood pressure readings are within the normal range today.  - Recent blood work conducted in April 2025, including iron, ferritin, complete blood count, and urine for protein, showed slightly low hemoglobin levels. A1c was normal, and urine protein was overall normal or near normal. Cholesterol and chemistry panel have not been checked.  - Advised to continue iron supplement regimen and monitor blood pressure. Encouraged to maintain oral hygiene, apply sunscreen, wear a seatbelt while driving, engage in physical activity such as walking daily, and use compression socks to promote leg health and reduce fatigue.  - Prescription for Jobst stockings 15-20 mmHg provided. Declined full physical examination today. Thyroid and vitamin D levels will be checked.    2. Nausea.  - Reports taking Unisom for nausea.  - No physical exam findings discussed.  - Advised to continue taking Unisom as needed.  - No new medications or therapies  prescribed.    3. Varicose veins.  - Reports swelling and blood vessel enlargement in legs.  - No physical exam findings discussed.  - Prescription for Jobst stockings 15-20 mmHg provided to help manage symptoms.  - No new referrals or other management discussed.        BMI is within normal parameters. No other follow-up for BMI required.      Tasha Mckinney  reports that she has never smoked. She has never been exposed to tobacco smoke. She has never used smokeless tobacco.            Depression: PHQ-2/9 Depression Screening  Little interest or pleasure in doing things?  0   Feeling down, depressed, or hopeless?  0   PHQ-2 Total Score  0   PHQ-9 Total Score            A1C:   Lab Results   Component Value Date    HGBA1C 5.40 02/13/2025      ACP: Advance Care Planning   ACP discussion was held with the patient during this visit. Patient does not have an advance directive, information provided.     Follow Up   Return in about 1 year (around 5/2/2026) for Annual, fasting.  Patient was given instructions and counseling regarding her condition or for health maintenance advice. Please see specific information pulled into the AVS if appropriate.     RTC/call  If symptoms worsen  Meds MOA and SE's reviewed and pt v/u    05/02/25   14:00 EDT    Patient or patient representative verbalized consent to the visit recording.  I have personally performed the services described in this document as transcribed by the above individual, and it is both accurate and complete.

## 2025-05-03 ENCOUNTER — RESULTS FOLLOW-UP (OUTPATIENT)
Dept: INTERNAL MEDICINE | Facility: CLINIC | Age: 40
End: 2025-05-03
Payer: MEDICAID

## 2025-05-03 LAB
25(OH)D3 SERPL-MCNC: 57.2 NG/ML (ref 30–100)
TSH SERPL DL<=0.05 MIU/L-ACNC: 1.11 UIU/ML (ref 0.27–4.2)

## 2025-05-05 ENCOUNTER — ROUTINE PRENATAL (OUTPATIENT)
Dept: OBSTETRICS AND GYNECOLOGY | Facility: CLINIC | Age: 40
End: 2025-05-05
Payer: MEDICAID

## 2025-05-05 VITALS — SYSTOLIC BLOOD PRESSURE: 118 MMHG | WEIGHT: 141.4 LBS | DIASTOLIC BLOOD PRESSURE: 80 MMHG | BODY MASS INDEX: 22.83 KG/M2

## 2025-05-05 DIAGNOSIS — O09.522 MULTIGRAVIDA OF ADVANCED MATERNAL AGE IN SECOND TRIMESTER: ICD-10-CM

## 2025-05-05 DIAGNOSIS — Z86.32 HISTORY OF GESTATIONAL DIABETES IN PRIOR PREGNANCY, CURRENTLY PREGNANT: ICD-10-CM

## 2025-05-05 DIAGNOSIS — Z22.330 GBS CARRIER: ICD-10-CM

## 2025-05-05 DIAGNOSIS — O09.299 HISTORY OF GESTATIONAL DIABETES IN PRIOR PREGNANCY, CURRENTLY PREGNANT: ICD-10-CM

## 2025-05-05 DIAGNOSIS — O09.92 HIGH-RISK PREGNANCY IN SECOND TRIMESTER: Primary | ICD-10-CM

## 2025-05-05 PROCEDURE — 99213 OFFICE O/P EST LOW 20 MIN: CPT

## 2025-05-05 RX ORDER — FAMOTIDINE 20 MG/1
20 TABLET, FILM COATED ORAL 2 TIMES DAILY
Qty: 60 TABLET | Refills: 3 | Status: SHIPPED | OUTPATIENT
Start: 2025-05-05 | End: 2026-05-05

## 2025-05-05 NOTE — PROGRESS NOTES
"Chief Complaint   Patient presents with    Routine Prenatal Visit       HPI  Tasha is a  currently at 24w3d who today reports the following:  Contractions - No; Leaking - No; Vaginal bleeding -  No; Heartburn - YES. She has not started omeprazole and is asking about something \"less strong\".     She reports good fetal movement. She has had some issues with varicose veins and her legs have been bothering her - she has had a hard time getting compression stockings.     She reports blood sugars are pretty good- under 90 fasting, and then 2 hours post anywhere from 120-180.     Review of Systems   Constitutional: Negative.    Gastrointestinal: Negative.    Neurological: Negative.    Psychiatric/Behavioral: Negative.         Objective  /80   Wt 64.1 kg (141 lb 6.4 oz)   LMP 11/15/2024   BMI 22.83 kg/m²     Physical Exam  Vitals and nursing note reviewed.   Psychiatric:         Mood and Affect: Mood normal.         Behavior: Behavior normal.         Thought Content: Thought content normal.         Judgment: Judgment normal.         Lab Results   Component Value Date    ABORH O Rh Positive 2014    ABO O 2025    RH Positive 2025    LABANTI Negative 2014    ABSCRN Negative 2025       Assessment and Plan  Diagnoses and all orders for this visit:    1. High-risk pregnancy in second trimester (Primary)    2. Multigravida of advanced maternal age in second trimester    3. GBS carrier    4. History of gestational diabetes in prior pregnancy, currently pregnant - [ ] follow GCT versus checking sugars    Other orders  -     famotidine (Pepcid) 20 MG tablet; Take 1 tablet by mouth 2 (Two) Times a Day.  Dispense: 60 tablet; Refill: 3        Continue with PNV's  Prenatal labs reviewed  Symptoms of preeclampsia  Tests scheduled today for her next visit none  Prefers to continue checking glucose levels in lieu of 1 hour GCT- encouraged her to send a log to Dr Maradiaga. She would like to hold off " on 24 hour urine collection at this time; discussed BP parameters and s/s pre-eclampsia. She agrees to do testing if symptoms arise.     Return for Next scheduled follow up.    Rachel Read, APRN  May 5, 2025

## 2025-05-28 ENCOUNTER — LAB (OUTPATIENT)
Dept: LAB | Facility: HOSPITAL | Age: 40
End: 2025-05-28
Payer: MEDICAID

## 2025-05-28 ENCOUNTER — ROUTINE PRENATAL (OUTPATIENT)
Dept: OBSTETRICS AND GYNECOLOGY | Facility: CLINIC | Age: 40
End: 2025-05-28
Payer: MEDICAID

## 2025-05-28 VITALS — BODY MASS INDEX: 22.93 KG/M2 | DIASTOLIC BLOOD PRESSURE: 70 MMHG | WEIGHT: 142 LBS | SYSTOLIC BLOOD PRESSURE: 100 MMHG

## 2025-05-28 DIAGNOSIS — O09.299 HISTORY OF GESTATIONAL DIABETES IN PRIOR PREGNANCY, CURRENTLY PREGNANT: ICD-10-CM

## 2025-05-28 DIAGNOSIS — O16.2 ELEVATED BLOOD PRESSURE AFFECTING PREGNANCY IN SECOND TRIMESTER, ANTEPARTUM: ICD-10-CM

## 2025-05-28 DIAGNOSIS — Z86.32 HISTORY OF GESTATIONAL DIABETES IN PRIOR PREGNANCY, CURRENTLY PREGNANT: ICD-10-CM

## 2025-05-28 DIAGNOSIS — O09.92 HIGH-RISK PREGNANCY IN SECOND TRIMESTER: Primary | ICD-10-CM

## 2025-05-28 DIAGNOSIS — O09.522 MULTIGRAVIDA OF ADVANCED MATERNAL AGE IN SECOND TRIMESTER: ICD-10-CM

## 2025-05-28 DIAGNOSIS — Z22.330 GBS CARRIER: ICD-10-CM

## 2025-05-28 PROCEDURE — 81050 URINALYSIS VOLUME MEASURE: CPT

## 2025-05-28 PROCEDURE — 84156 ASSAY OF PROTEIN URINE: CPT

## 2025-05-28 NOTE — PROGRESS NOTES
Chief Complaint   Patient presents with    Routine Prenatal Visit     No c/c       HPI: Tasha is a  currently at 27w5d who today reports the following:  Contractions - No; Leaking - No; Vaginal bleeding -  No; Swelling of extremities - improved as compared to the prior visit.  She reports good fetal movement. She has a blood sugar log- fastings all within normal limits, only a few postptrandials that are elevated- but she has been working diligently to watch carbs and balancing with protein.   ROS:  GI: Nausea - No; Constipation - No; Diarrhea - No    Neuro: Headache - No; Visual change - No    Respiratory: Cough - No; SOB - No; fever - No     EXAM:  Vitals: See prenatal flowsheet   Abdomen: See prenatal flowsheet   Urine glucose/protein: See prenatal flowsheet   Pelvic: See prenatal flowsheet   MDM:   Impression: Supervision of high risk pregnancy  History of gestational diabetes in prior pregnancy  AMA - declines testing  GBS carrier   Tests done today: Urine dip for protein and glucose   Topics discussed: Prenatal labs reviewed  Continue with Rx prenatal vitamins and OTC H2 blockers.  OTC medical options for her sleep-  discussed magnesium   Tests scheduled today for her next visit:   none

## 2025-05-29 LAB
COLLECT DURATION TIME UR: 24 HRS
PROT 24H UR-MRATE: 124.2 MG/24HOURS (ref 0–150)
SPECIMEN VOL 24H UR: 2700 ML

## 2025-06-16 ENCOUNTER — ROUTINE PRENATAL (OUTPATIENT)
Dept: OBSTETRICS AND GYNECOLOGY | Facility: CLINIC | Age: 40
End: 2025-06-16
Payer: MEDICAID

## 2025-06-16 VITALS — BODY MASS INDEX: 23.41 KG/M2 | WEIGHT: 145 LBS | SYSTOLIC BLOOD PRESSURE: 110 MMHG | DIASTOLIC BLOOD PRESSURE: 70 MMHG

## 2025-06-16 DIAGNOSIS — Z86.32 HISTORY OF GESTATIONAL DIABETES IN PRIOR PREGNANCY, CURRENTLY PREGNANT: ICD-10-CM

## 2025-06-16 DIAGNOSIS — O09.299 HISTORY OF GESTATIONAL DIABETES IN PRIOR PREGNANCY, CURRENTLY PREGNANT: ICD-10-CM

## 2025-06-16 DIAGNOSIS — O09.93 HIGH-RISK PREGNANCY IN THIRD TRIMESTER: Primary | ICD-10-CM

## 2025-06-16 DIAGNOSIS — O24.410 GDM (GESTATIONAL DIABETES MELLITUS), CLASS A1: ICD-10-CM

## 2025-06-16 DIAGNOSIS — Z22.330 GBS CARRIER: ICD-10-CM

## 2025-06-16 DIAGNOSIS — O09.523 MULTIGRAVIDA OF ADVANCED MATERNAL AGE IN THIRD TRIMESTER: ICD-10-CM

## 2025-06-16 DIAGNOSIS — O26.849 UTERINE SIZE DATE DISCREPANCY PREGNANCY: ICD-10-CM

## 2025-06-16 DIAGNOSIS — O36.8120 DECREASED FETAL MOVEMENTS IN SECOND TRIMESTER, SINGLE OR UNSPECIFIED FETUS: ICD-10-CM

## 2025-06-16 NOTE — PROGRESS NOTES
Chief Complaint   Patient presents with    Routine Prenatal Visit     Has been having intermittent decrease fetal movement / NST started at 1105       HPI: Tasha is a  currently at 30w3d who today reports the following:  Contractions - No; Leaking - No; Vaginal bleeding -  No; Swelling of extremities - No.    She reports not feeling many movements today and will go periods of time with movements. However, upon entry to room patient reports feeling movements and is asking to get off of the monitor.     She has some values of glucose levels today. There are a few fasting elevated just above cutoff and then a few post prandial values elevated. She reports trying to adjust her diet and she can tell when a value is up based on activity or certain meal.     ROS:  GI: Nausea - No; Constipation - No; Diarrhea - No    Neuro: Headache - No; Visual change - No      EXAM:  Vitals: See prenatal flowsheet   Abdomen: See prenatal flowsheet   Urine glucose/protein: See prenatal flowsheet   Pelvic: See prenatal flowsheet   MDM:   Impression: Supervision of high risk pregnancy  Decreased fetal movements   AMA - declines testing  Declined 1 hour GCT - has been checking glucose levels - presumed GDMA1 at this point.   Size < dates by FH  GBS carrier   Tests done today: Urine dip for protein and glucose  NST - reactive   Topics discussed: Continue with PNV's  Prenatal labs reviewed   labor signs and symptoms  Symptoms of preeclampsia  TDAP vaccination recommended between 27-36 weeks gestation OR after birth  Reviewed fetal movements counts   Recommend she keep checking blood sugars and at this point we would assumed she is GDMA1. Encouraged her to send me more values in ~ one week on my chart.    Tests scheduled today for her next visit:   U/S for EFW and AUDRA today given FH off by more than 2 cm and DFM     Orders Placed This Encounter   Procedures    US Ob Follow Up Transabdominal Approach     Standing Status:   Future      Expected Date:   2025     Expiration Date:   2026     Reason for Exam::   check EFW, AUDRA     Release to patient:   Routine Release [[id number]]     Non Stress Test      Patient: Tasha Mckinney  : 1985  MRN: [id number]  CSN: [id number]  Date: 2025    Estimated Date of Delivery: 25  Gestational Age: 30w3d    Indication for NST decreased fetal movement    Total time > 20 m inutes                 Interpretation    Baseline  beats per minute  Moderate variability    Accelerations  Present   Decelerations Possible variable decelerations versus coming off of the monitor and then no decels for > 20 minutes. Patient sat up multiple times during monitoring and walked around and kept asking to come off of monitor       Additional Comments Reactive and reassuring        Recommendations for f/u See above        This note has been electronically signed.    Marie Maradiaga MD

## 2025-06-17 ENCOUNTER — TELEPHONE (OUTPATIENT)
Dept: OBSTETRICS AND GYNECOLOGY | Facility: CLINIC | Age: 40
End: 2025-06-17
Payer: MEDICAID

## 2025-06-17 NOTE — TELEPHONE ENCOUNTER
Please inform patient her ultrasound of baby yesterday was reassuring for weight and fluid. I recommend she keep her scheduled upcoming appointment with [facility] given their plan to take detailed views of baby's heart again.     Thanks, Marie Maradiaga MD     Eye  (Pediatric) Eye (Pediatric)

## 2025-06-20 RX ORDER — BLOOD-GLUCOSE METER
KIT MISCELLANEOUS
Qty: 100 KIT | Refills: 1 | Status: SHIPPED | OUTPATIENT
Start: 2025-06-20

## 2025-06-26 ENCOUNTER — OFFICE VISIT (OUTPATIENT)
Dept: OBSTETRICS AND GYNECOLOGY | Facility: HOSPITAL | Age: 40
End: 2025-06-26
Payer: MEDICAID

## 2025-06-26 ENCOUNTER — HOSPITAL ENCOUNTER (OUTPATIENT)
Dept: WOMENS IMAGING | Facility: HOSPITAL | Age: 40
Discharge: HOME | End: 2025-06-26
Admitting: OBSTETRICS & GYNECOLOGY
Payer: MEDICAID

## 2025-06-26 ENCOUNTER — DOCUMENTATION (OUTPATIENT)
Dept: OBSTETRICS AND GYNECOLOGY | Facility: HOSPITAL | Age: 40
End: 2025-06-26
Payer: MEDICAID

## 2025-06-26 VITALS — BODY MASS INDEX: 23.51 KG/M2 | SYSTOLIC BLOOD PRESSURE: 128 MMHG | DIASTOLIC BLOOD PRESSURE: 88 MMHG | WEIGHT: 145.6 LBS

## 2025-06-26 DIAGNOSIS — Z86.32 HISTORY OF GESTATIONAL DIABETES IN PRIOR PREGNANCY, CURRENTLY PREGNANT: ICD-10-CM

## 2025-06-26 DIAGNOSIS — Z34.90 PREGNANCY, UNSPECIFIED GESTATIONAL AGE: ICD-10-CM

## 2025-06-26 DIAGNOSIS — O36.5930 POOR FETAL GROWTH AFFECTING MANAGEMENT OF MOTHER IN THIRD TRIMESTER, SINGLE OR UNSPECIFIED FETUS: ICD-10-CM

## 2025-06-26 DIAGNOSIS — O09.522 MULTIGRAVIDA OF ADVANCED MATERNAL AGE IN SECOND TRIMESTER: ICD-10-CM

## 2025-06-26 DIAGNOSIS — O24.410 GDM (GESTATIONAL DIABETES MELLITUS), CLASS A1: Primary | ICD-10-CM

## 2025-06-26 DIAGNOSIS — O09.299 HISTORY OF GESTATIONAL DIABETES IN PRIOR PREGNANCY, CURRENTLY PREGNANT: ICD-10-CM

## 2025-06-26 DIAGNOSIS — O16.3 ELEVATED BLOOD PRESSURE AFFECTING PREGNANCY IN THIRD TRIMESTER, ANTEPARTUM: ICD-10-CM

## 2025-06-26 PROCEDURE — 76816 OB US FOLLOW-UP PER FETUS: CPT

## 2025-06-26 PROCEDURE — 76820 UMBILICAL ARTERY ECHO: CPT

## 2025-06-26 PROCEDURE — 76819 FETAL BIOPHYS PROFIL W/O NST: CPT

## 2025-06-26 NOTE — ASSESSMENT & PLAN NOTE
First blood pressure 140/84 and on repeat was 140/86.  Patient asymptomatic today.  Blood pressure after ultrasound.  Would recommend continued close observation of blood pressures at remainder of pregnancy.

## 2025-06-26 NOTE — PROGRESS NOTES
Pt reports recent headches and elevated BP at home (no ha today). Sees OB 6/30. Denies other complaints. Pt unable to leave urine sample currently.

## 2025-06-26 NOTE — LETTER
2025     Marie Maradiaga MD  [address] [facility]  [address] [place] [address]    Patient: Tasha Mckinney   YOB: 1985   Date of Visit: 2025     Dear Marie Maradiaga MD:       Thank you for referring Tasha Mckinney to me for evaluation. Below are the relevant portions of my assessment and plan of care.    If you have questions, please do not hesitate to call me. I look forward to following Tasha along with you.         Sincerely,        Nixon Ruiz MD        CC: No Recipients    Nixon Ruiz MD  [phone]  Sign when Signing Visit      Maternal/Fetal Medicine Consult Note   Date: 2025  Name: Tasha Mckinney    : 1985     MRN: [id number]     Referring Provider: Marie Maradiaga MD    Chief Complaint  Advanced Maternal Age    Subjective     History of Present Illness:  Tasha Mckinney is a 40 y.o.  31w6d who presents today for advanced maternal age and gestational diabetes    JULY: Estimated Date of Delivery: 25     ROS:   Otherwise Noted in HPI      Current Outpatient Medications:   •  B Complex Vitamins (VITAMIN B-COMPLEX PO), , Disp: , Rfl:   •  Blood Glucose Monitoring Suppl (FreeStyle Lite) w/Device kit, USE AS NEEDED. PLEASE CHECK YOUR SUGAR FASTING (GOAL <95) AND 2 HOURS AFTER EACH MEAL (GOAL <120), Disp: 100 kit, Rfl: 1  •  famotidine (Pepcid) 20 MG tablet, Take 1 tablet by mouth 2 (Two) Times a Day., Disp: 60 tablet, Rfl: 3  •  glucose blood test strip, Please take your blood sugar fasting (goal <95) and 2 hours after each meal (goal <120) and record., Disp: 120 each, Rfl: 12  •  Lancets (freestyle) lancets, Please take your blood sugar fasting (goal <95) and 2 hours after each meal (goal <120) and record., Disp: 120 each, Rfl: 12  •  magnesium gluconate 250 MG tablet tablet, 1 tablet., Disp: , Rfl:   •  Prenatal Vit-Fe Fumarate-FA (PRENATAL VITAMIN PO), Take 1 tablet by mouth Daily., Disp: , Rfl:   •  vitamin B-6  "(PYRIDOXINE) 25 MG tablet, Take 1 tablet by mouth 2 (Two) Times a Day As Needed (nausea)., Disp: 30 tablet, Rfl: 1  •  VITAMIN D PO, Take  by mouth., Disp: , Rfl:     Objective     Vital Signs  /86   Wt 66 kg (145 lb 9.6 oz)   LMP 11/15/2024   Estimated body mass index is 23.51 kg/m² as calculated from the following:    Height as of 25: 167.6 cm (65.98\").    Weight as of this encounter: 66 kg (145 lb 9.6 oz).    Ultrasound Impression:   See Viewpoint     Assessment and Plan     Tasha Mckinney is a 40 y.o.  31w6d who presents today for advanced maternal age and gestational diabetes    Diagnoses and all orders for this visit:    1. GDM (gestational diabetes mellitus), class A1 (Primary)  Assessment & Plan:  GLUCOSE MONITORING   Target plasma blood glucose values in pregnancy are less than 90 mg/dL fasting, and less than 120 mg/dL at 2 hour postprandially or 140 mg/dL at 1 hour postprandially.  Medical therapy for glycemic control is indicated if target values are not achieved after 1-2 weeks of dietary therapy, and could include oral hypoglycemic agents such as metformin or insulin therapy.  We discussed insulin treatment as the gold standard.      MATERNAL RISKS  Insulin resistance places women at higher risk for preeclampsia.  Those with gestational diabetes are also at higher risk of developing diabetes in their lifetime outside of pregnancy.  A 2-hour oral glucose tolerance test at 6 weeks postpartum should be completed to clarify a diagnosis of pregestational diabetes.  Screening for diabetes should be performed every 1-3 years after that.    FETAL AND  RISKS  Diabetes in pregnancy increases risk of macrosomia, with associated birth injuries including nerve palsies, fractures and cephalhematoma.  Macrosomia increases the need for  delivery and has been linked to the development of adolescent obesity.  Glycemic control is important to reducing these risks.       " morbidities among infants of diabetic mothers include hypoglycemia, hyperbilirubinemia, and metabolic instability.  In addition, respiratory distress can result from delayed fetal lung maturity.    Intrapartum glycemic control to maintain maternal glucose levels within a narrow range (between  mg/dL) reduces risks of  asphyxia and  hypoglycemia.  Close monitoring of the  is necessary.      RECOMMENDATIONS:  - She will keep a glucose log as discussed above.    - A growth scan will be ordered in 4 weeks.  Should she require medications for glucose control she will need additional growth scans as well as  testing.   - Deliver at 39-40 weeks if gestational A2 diabetic and medications are required.  May await spontaneous onset of labor if gestational A1 diabetic, but do not delay delivery beyond 41 weeks.   - Elective  delivery for suspected macrosomia at estimated fetal weight of greater than 4500 grams.   - Plan for 2-hour OGTT at 6 weeks postpartum.   - Continuation of lifestyle changes PP to lower risk of type 2 DM in the future.      Orders:  -     [facility]; Future    2. Elevated blood pressure affecting pregnancy in third trimester, antepartum  Assessment & Plan:  First blood pressure 140/84 and on repeat was 140/86.  Patient asymptomatic today.  Blood pressure after ultrasound.  Would recommend continued close observation of blood pressures at remainder of pregnancy.    Orders:  -     [facility] [place]; Future    3. Poor fetal growth affecting management of mother in third trimester, single or unspecified fetus  Assessment & Plan:  Today's ultrasound shows overall fetal weight at the 18th percentile though abdominal circumference at the 7th percentile.  Normal AUDRA, BPP, umbilical artery Dopplers would recommend twice weekly  testing in your office. Follow-up in 3 weeks.           Follow Up  3 weeks    I spent 10  minutes caring for the patient on the day of service. This included: obtaining or reviewing a separately obtained medical history, reviewing patient records, performing a medically appropriate exam and/or evaluation, counseling or educating the patient/family/caregiver, ordering medications, labs, and/or procedures and documenting such in the medical record. This does not include time spent on review and interpretation of other tests such as fetal ultrasound or the performance of other procedures such as amniocentesis or CVS.      Nixon Ruiz MD, FACOG  Maternal Fetal Medicine, [facility]

## 2025-06-26 NOTE — ASSESSMENT & PLAN NOTE
GLUCOSE MONITORING   Target plasma blood glucose values in pregnancy are less than 90 mg/dL fasting, and less than 120 mg/dL at 2 hour postprandially or 140 mg/dL at 1 hour postprandially.  Medical therapy for glycemic control is indicated if target values are not achieved after 1-2 weeks of dietary therapy, and could include oral hypoglycemic agents such as metformin or insulin therapy.  We discussed insulin treatment as the gold standard.      MATERNAL RISKS  Insulin resistance places women at higher risk for preeclampsia.  Those with gestational diabetes are also at higher risk of developing diabetes in their lifetime outside of pregnancy.  A 2-hour oral glucose tolerance test at 6 weeks postpartum should be completed to clarify a diagnosis of pregestational diabetes.  Screening for diabetes should be performed every 1-3 years after that.    FETAL AND  RISKS  Diabetes in pregnancy increases risk of macrosomia, with associated birth injuries including nerve palsies, fractures and cephalhematoma.  Macrosomia increases the need for  delivery and has been linked to the development of adolescent obesity.  Glycemic control is important to reducing these risks.       morbidities among infants of diabetic mothers include hypoglycemia, hyperbilirubinemia, and metabolic instability.  In addition, respiratory distress can result from delayed fetal lung maturity.    Intrapartum glycemic control to maintain maternal glucose levels within a narrow range (between  mg/dL) reduces risks of  asphyxia and  hypoglycemia.  Close monitoring of the  is necessary.      RECOMMENDATIONS:  - She will keep a glucose log as discussed above.    - A growth scan will be ordered in 4 weeks.  Should she require medications for glucose control she will need additional growth scans as well as  testing.   - Deliver at 39-40 weeks if gestational A2 diabetic and medications are required.   May await spontaneous onset of labor if gestational A1 diabetic, but do not delay delivery beyond 41 weeks.   - Elective  delivery for suspected macrosomia at estimated fetal weight of greater than 4500 grams.   - Plan for 2-hour OGTT at 6 weeks postpartum.   - Continuation of lifestyle changes PP to lower risk of type 2 DM in the future.

## 2025-06-26 NOTE — ASSESSMENT & PLAN NOTE
Today's ultrasound shows overall fetal weight at the 18th percentile though abdominal circumference at the 7th percentile.  Normal AUDRA, BPP, umbilical artery Dopplers would recommend twice weekly  testing in your office. Follow-up in 3 weeks.

## 2025-06-26 NOTE — PROGRESS NOTES
"    Maternal/Fetal Medicine Consult Note   Date: 2025  Name: Tasha Mckinney    : 1985     MRN: [id number]     Referring Provider: Marie Maradiaga MD    Chief Complaint  Advanced Maternal Age    Subjective     History of Present Illness:  Tasha Mckinney is a 40 y.o.  31w6d who presents today for advanced maternal age and gestational diabetes    JULY: Estimated Date of Delivery: 25     ROS:   Otherwise Noted in HPI      Current Outpatient Medications:     B Complex Vitamins (VITAMIN B-COMPLEX PO), , Disp: , Rfl:     Blood Glucose Monitoring Suppl (FreeStyle Lite) w/Device kit, USE AS NEEDED. PLEASE CHECK YOUR SUGAR FASTING (GOAL <95) AND 2 HOURS AFTER EACH MEAL (GOAL <120), Disp: 100 kit, Rfl: 1    famotidine (Pepcid) 20 MG tablet, Take 1 tablet by mouth 2 (Two) Times a Day., Disp: 60 tablet, Rfl: 3    glucose blood test strip, Please take your blood sugar fasting (goal <95) and 2 hours after each meal (goal <120) and record., Disp: 120 each, Rfl: 12    Lancets (freestyle) lancets, Please take your blood sugar fasting (goal <95) and 2 hours after each meal (goal <120) and record., Disp: 120 each, Rfl: 12    magnesium gluconate 250 MG tablet tablet, 1 tablet., Disp: , Rfl:     Prenatal Vit-Fe Fumarate-FA (PRENATAL VITAMIN PO), Take 1 tablet by mouth Daily., Disp: , Rfl:     vitamin B-6 (PYRIDOXINE) 25 MG tablet, Take 1 tablet by mouth 2 (Two) Times a Day As Needed (nausea)., Disp: 30 tablet, Rfl: 1    VITAMIN D PO, Take  by mouth., Disp: , Rfl:     Objective     Vital Signs  /86   Wt 66 kg (145 lb 9.6 oz)   LMP 11/15/2024   Estimated body mass index is 23.51 kg/m² as calculated from the following:    Height as of 25: 167.6 cm (65.98\").    Weight as of this encounter: 66 kg (145 lb 9.6 oz).    Ultrasound Impression:   See Viewpoint     Assessment and Plan     Tasha Mckinney is a 40 y.o.  31w6d who presents today for advanced maternal age and " gestational diabetes    Diagnoses and all orders for this visit:    1. GDM (gestational diabetes mellitus), class A1 (Primary)  Assessment & Plan:  GLUCOSE MONITORING   Target plasma blood glucose values in pregnancy are less than 90 mg/dL fasting, and less than 120 mg/dL at 2 hour postprandially or 140 mg/dL at 1 hour postprandially.  Medical therapy for glycemic control is indicated if target values are not achieved after 1-2 weeks of dietary therapy, and could include oral hypoglycemic agents such as metformin or insulin therapy.  We discussed insulin treatment as the gold standard.      MATERNAL RISKS  Insulin resistance places women at higher risk for preeclampsia.  Those with gestational diabetes are also at higher risk of developing diabetes in their lifetime outside of pregnancy.  A 2-hour oral glucose tolerance test at 6 weeks postpartum should be completed to clarify a diagnosis of pregestational diabetes.  Screening for diabetes should be performed every 1-3 years after that.    FETAL AND  RISKS  Diabetes in pregnancy increases risk of macrosomia, with associated birth injuries including nerve palsies, fractures and cephalhematoma.  Macrosomia increases the need for  delivery and has been linked to the development of adolescent obesity.  Glycemic control is important to reducing these risks.       morbidities among infants of diabetic mothers include hypoglycemia, hyperbilirubinemia, and metabolic instability.  In addition, respiratory distress can result from delayed fetal lung maturity.    Intrapartum glycemic control to maintain maternal glucose levels within a narrow range (between  mg/dL) reduces risks of  asphyxia and  hypoglycemia.  Close monitoring of the  is necessary.      RECOMMENDATIONS:  - She will keep a glucose log as discussed above.    - A growth scan will be ordered in 4 weeks.  Should she require medications for glucose control she  will need additional growth scans as well as  testing.   - Deliver at 39-40 weeks if gestational A2 diabetic and medications are required.  May await spontaneous onset of labor if gestational A1 diabetic, but do not delay delivery beyond 41 weeks.   - Elective  delivery for suspected macrosomia at estimated fetal weight of greater than 4500 grams.   - Plan for 2-hour OGTT at 6 weeks postpartum.   - Continuation of lifestyle changes PP to lower risk of type 2 DM in the future.      Orders:  -     [facility] [place]; Future    2. Elevated blood pressure affecting pregnancy in third trimester, antepartum  Assessment & Plan:  First blood pressure 140/84 and on repeat was 140/86.  Patient asymptomatic today.  Blood pressure after ultrasound.  Would recommend continued close observation of blood pressures at remainder of pregnancy.    Orders:  -     [facility] [place]; Future    3. Poor fetal growth affecting management of mother in third trimester, single or unspecified fetus  Assessment & Plan:  Today's ultrasound shows overall fetal weight at the 18th percentile though abdominal circumference at the 7th percentile.  Normal AUDRA, BPP, umbilical artery Dopplers would recommend twice weekly  testing in your office. Follow-up in 3 weeks.           Follow Up  3 weeks    I spent 10 minutes caring for the patient on the day of service. This included: obtaining or reviewing a separately obtained medical history, reviewing patient records, performing a medically appropriate exam and/or evaluation, counseling or educating the patient/family/caregiver, ordering medications, labs, and/or procedures and documenting such in the medical record. This does not include time spent on review and interpretation of other tests such as fetal ultrasound or the performance of other procedures such as amniocentesis or CVS.      Nixon Ruiz MD, FACOG  Maternal Fetal Medicine, [facility]  [facility]    [facility] [place]

## 2025-06-26 NOTE — PROGRESS NOTES
Spoke with patient in person.  Diabetic folder given and reviewed.  Reviewed with office hours and how to contact, how to send in blood sugar log.  Reviewed diet, the miportance of protein with meals and snacks.  Patient voices understanding.   Guerda Kenney RN

## 2025-07-10 ENCOUNTER — MEDICATION THERAPY MANAGEMENT (OUTPATIENT)
Dept: OBSTETRICS AND GYNECOLOGY | Facility: HOSPITAL | Age: 40
End: 2025-07-10
Payer: MEDICAID

## 2025-07-10 RX ORDER — METFORMIN HYDROCHLORIDE 500 MG/1
500 TABLET, EXTENDED RELEASE ORAL 2 TIMES DAILY WITH MEALS
Qty: 60 TABLET | Refills: 1 | Status: SHIPPED | OUTPATIENT
Start: 2025-07-10

## 2025-07-14 ENCOUNTER — ROUTINE PRENATAL (OUTPATIENT)
Dept: OBSTETRICS AND GYNECOLOGY | Facility: CLINIC | Age: 40
End: 2025-07-14
Payer: MEDICAID

## 2025-07-14 VITALS — WEIGHT: 149 LBS | SYSTOLIC BLOOD PRESSURE: 130 MMHG | BODY MASS INDEX: 24.06 KG/M2 | DIASTOLIC BLOOD PRESSURE: 80 MMHG

## 2025-07-14 DIAGNOSIS — O36.5930 POOR FETAL GROWTH AFFECTING MANAGEMENT OF MOTHER IN THIRD TRIMESTER, SINGLE OR UNSPECIFIED FETUS: ICD-10-CM

## 2025-07-14 DIAGNOSIS — O24.410 GDM (GESTATIONAL DIABETES MELLITUS), CLASS A1: ICD-10-CM

## 2025-07-14 DIAGNOSIS — O09.523 MULTIGRAVIDA OF ADVANCED MATERNAL AGE IN THIRD TRIMESTER: ICD-10-CM

## 2025-07-14 DIAGNOSIS — O09.93 HIGH-RISK PREGNANCY IN THIRD TRIMESTER: Primary | ICD-10-CM

## 2025-07-14 DIAGNOSIS — Z22.330 GBS CARRIER: ICD-10-CM

## 2025-07-14 LAB
GLUCOSE UR STRIP-MCNC: NEGATIVE MG/DL
PROT UR STRIP-MCNC: NEGATIVE MG/DL

## 2025-07-14 NOTE — PROGRESS NOTES
Chief Complaint   Patient presents with    Routine Prenatal Visit     No c/c       HPI: Tasha is a  currently at 34w3d who today reports the following:  Contractions - No; Leaking - No; Vaginal bleeding -  No; Swelling of extremities - No.    ROS:  GI: Nausea - No; Constipation - No; Diarrhea - No    Neuro: Headache - No; Visual change - No      EXAM:  Vitals: See prenatal flowsheet   Abdomen: See prenatal flowsheet   Urine glucose/protein: See prenatal flowsheet   Pelvic: See prenatal flowsheet   MDM:   Impression: Supervision of high risk pregnancy  IUGR by AC  AMA   Tests done today: Urine dip for protein and glucose  NST - reactive   Topics discussed: Continue with PNV's  Prenatal labs reviewed   labor signs and symptoms  Symptoms of preeclampsia  Reviewed PDC recommendation for twice weekly  testing . She wants to wait until she is seen Friday by PDC before scheduling out these appointments or before schedule 39 week IOL.    Tests scheduled today for her next visit:   U/S for PDC this Friday as previously scheduled.      Orders Placed This Encounter   Procedures    US Fetal Biophysical Profile;Without Non-Stress Testing     Standing Status:   Future     Expected Date:   2025     Expiration Date:   2026     Reason for Exam::   IUGR by AC     Release to patient:   Routine Release [[id number]]    POC Urinalysis Dipstick     This order was created through External Result Entry     Release to patient:   Routine Release [[id number]]     Non Stress Test      Patient: Tasha Mckinney  : 1985  MRN: [id number]  CSN: [id number]  Date: 2025    Estimated Date of Delivery: 25  Gestational Age: 34w3d    Indication for NST intrauterine growth retardation    Total time > 30 minutes                Interpretation    Baseline  beats per minute   Accelerations  Present   Decelerations Absent       Additional Comments Reactive and reassuring        Recommendations for  f/u See above       This note has been electronically signed.    Marie Maradiaga MD

## 2025-07-18 ENCOUNTER — HOSPITAL ENCOUNTER (OUTPATIENT)
Dept: WOMENS IMAGING | Facility: HOSPITAL | Age: 40
Discharge: HOME | End: 2025-07-18
Payer: MEDICAID

## 2025-07-18 ENCOUNTER — OFFICE VISIT (OUTPATIENT)
Dept: OBSTETRICS AND GYNECOLOGY | Facility: HOSPITAL | Age: 40
End: 2025-07-18
Payer: MEDICAID

## 2025-07-18 VITALS — DIASTOLIC BLOOD PRESSURE: 86 MMHG | WEIGHT: 149.8 LBS | SYSTOLIC BLOOD PRESSURE: 130 MMHG | BODY MASS INDEX: 24.19 KG/M2

## 2025-07-18 DIAGNOSIS — O24.410 GDM (GESTATIONAL DIABETES MELLITUS), CLASS A1: ICD-10-CM

## 2025-07-18 DIAGNOSIS — O09.93 HIGH-RISK PREGNANCY IN THIRD TRIMESTER: Primary | ICD-10-CM

## 2025-07-18 DIAGNOSIS — O16.3 ELEVATED BLOOD PRESSURE AFFECTING PREGNANCY IN THIRD TRIMESTER, ANTEPARTUM: ICD-10-CM

## 2025-07-18 DIAGNOSIS — O09.523 MULTIGRAVIDA OF ADVANCED MATERNAL AGE IN THIRD TRIMESTER: ICD-10-CM

## 2025-07-18 DIAGNOSIS — O36.5930 POOR FETAL GROWTH AFFECTING MANAGEMENT OF MOTHER IN THIRD TRIMESTER, SINGLE OR UNSPECIFIED FETUS: ICD-10-CM

## 2025-07-18 DIAGNOSIS — Z22.330 GBS CARRIER: ICD-10-CM

## 2025-07-18 DIAGNOSIS — O41.00X0 OLIGOHYDRAMNIOS, ANTEPARTUM, SINGLE OR UNSPECIFIED FETUS: ICD-10-CM

## 2025-07-18 DIAGNOSIS — Z3A.35 35 WEEKS GESTATION OF PREGNANCY: ICD-10-CM

## 2025-07-18 PROCEDURE — 76816 OB US FOLLOW-UP PER FETUS: CPT

## 2025-07-18 PROCEDURE — 76819 FETAL BIOPHYS PROFIL W/O NST: CPT

## 2025-07-18 NOTE — PROGRESS NOTES
F/U with Dr. Maradiaga in 1 week.  NIPT declined.  Pt reports +FM.  Reports [place] contractions.  Denies vaginal bleeding, leaking of fluid.  Fasting blood sugars ~90s ; 2HPP 100s-110s.  PDC managing.

## 2025-07-18 NOTE — PROGRESS NOTES
"    Maternal/Fetal Medicine Consult Note     Name: Tasha Mckinney    : 1985     MRN: [id number]     Referring Provider: Marie Maradiaga MD    Chief Complaint  AMA; GDM; Small AC    Subjective     History of Present Illness:  Tasha Mckinney is a 40 y.o.  35w0d who presents today for a follow-up ultrasound to assess interval growth and fetal wellbeing.     Pt denies LOF/VB/ctx's. +FM.    JULY: Estimated Date of Delivery: 25     ROS:   As noted in HPI.     Past Medical History:   Diagnosis Date    Easy bruising     Ectopic pregnancy     Frequent nosebleeds     GERD (gastroesophageal reflux disease)     Gestational diabetes 2010    Gestational diabetes 2014    Gestational diabetes 2017    Headache     History of kidney stones 2014    History of medical problems     Tachycardia, varicose veins, chronic sinusitis    Hyperlipidemia     Hypertension     Elevated bp    Kidney stone  and in     Liver disease     Fatty liver    Migraine     Ovarian cyst 2008    PONV (postoperative nausea and vomiting)     Recurrent pregnancy loss, antepartum condition or complication ,     Tachycardia in pregnancy 2010    Never treated    Varicella Childhood    Vitamin D deficiency       Past Surgical History:   Procedure Laterality Date    LASER ABLATION      VERICOSE VEIN ABLATION      OB History          7    Para   4    Term   4       0    AB   2    Living   4         SAB   2    IAB   0    Ectopic   0    Molar   0    Multiple   0    Live Births   4          Obstetric Comments    - Mario   - Maxim   - Cornelia   - Preet  History of GDMA1 - all pregnancies   No history of abnormal pap smears   No history of STIs               Objective     Vital Signs  /86   Wt 67.9 kg (149 lb 12.8 oz)   LMP 11/15/2024   Estimated body mass index is 24.19 kg/m² as calculated from the following:    Height as of 25: 167.6 cm (65.98\").    Weight as of " this encounter: 67.9 kg (149 lb 12.8 oz).    Physical Exam  Constitutional:       Appearance: Normal appearance. She is normal weight.   HENT:      Head: Normocephalic and atraumatic.   Cardiovascular:      Rate and Rhythm: Normal rate.   Pulmonary:      Effort: Pulmonary effort is normal.   Musculoskeletal:         General: Normal range of motion.      Cervical back: Normal range of motion and neck supple.   Neurological:      General: No focal deficit present.      Mental Status: She is alert and oriented to person, place, and time.   Psychiatric:         Mood and Affect: Mood normal.         Behavior: Behavior normal.         Thought Content: Thought content normal.         Judgment: Judgment normal.     Ultrasound Impression:   Vtx, S=D, low AUDRA at 5cms but 2x2cm pocket, anterior placenta, nl anatomy, BPP 8/8, UA Dopplers nl.    Assessment and Plan     Diagnoses and all orders for this visit:    1. High-risk pregnancy in third trimester (Primary)    2. Multigravida of advanced maternal age in third trimester    3. GBS carrier    4. GDM (gestational diabetes mellitus), class A1  Assessment & Plan:  Patient is checking blood sugars 4x/day and sending in logs. Her fasting values are borderline but her post-meal values are all essentially normal.     I had recommended patient start Metformin 500mgs XL BID but patient wanted to try diet modification instead.      - We will continue to review her BG logs weekly      5. Elevated blood pressure affecting pregnancy in third trimester, antepartum  Assessment & Plan:  BP today is 130/86.       6. Poor fetal growth affecting management of mother in third trimester (AC < 10%)  Assessment & Plan:  Fetal growth is actually much better today. EFW 38th and AC 41st percentile.       7. Oligohydramnios, antepartum, single or unspecified fetus  Assessment & Plan:  AUDRA is 5cms today with a 2cm x 2cm pocket. Encouraged patient to rest and increase po hydration over the weekend.       - Follow-up scheduled on Monday/Tuesday for limited scan to look at AUDRA      8. 35 weeks gestation of pregnancy         Follow Up  Return in about 3 days (around 7/21/2025).    I spent 10 minutes caring for the patient on the day of service. This included: obtaining or reviewing a separately obtained medical history, reviewing patient records, performing a medically appropriate exam and/or evaluation, counseling or educating the patient/family/caregiver, ordering medications, labs, and/or procedures and documenting such in the medical record. This does not include time spent on review and interpretation of other tests such as fetal ultrasound or the performance of other procedures such as amniocentesis or CVS.      Sariah Delgado MD  07/18/2025

## 2025-07-18 NOTE — ASSESSMENT & PLAN NOTE
AUDRA is 5cms today with a 2cm x 2cm pocket. Encouraged patient to rest and increase po hydration over the weekend.      - Follow-up scheduled on Monday/Tuesday for limited scan to look at AUDRA

## 2025-07-18 NOTE — LETTER
2025     Marie Maradiaga MD  [address] [facility]  [address] [place] [address]    Patient: Tasha Mckinney   YOB: 1985   Date of Visit: 2025     Dear Marie Maradiaga MD:       Thank you for referring Tasha Mckinney to me for evaluation. Below are the relevant portions of my assessment and plan of care.    If you have questions, please do not hesitate to call me. I look forward to following Tasha along with you.         Sincerely,        Sariah Delgado MD        CC: No Recipients    Sariah Delgado MD  25 [phone]  Sign when Signing Visit      Maternal/Fetal Medicine Consult Note     Name: Tasha Mckinney    : 1985     MRN: [id number]     Referring Provider: Marie Maradiaga MD    Chief Complaint  AMA; GDM; Small AC    Subjective     History of Present Illness:  Tasha Mckinney is a 40 y.o.  35w0d who presents today for a follow-up ultrasound to assess interval growth and fetal wellbeing.     Pt denies LOF/VB/ctx's. +FM.    JULY: Estimated Date of Delivery: 25     ROS:   As noted in HPI.     Past Medical History:   Diagnosis Date   • Easy bruising    • Ectopic pregnancy    • Frequent nosebleeds    • GERD (gastroesophageal reflux disease)    • Gestational diabetes 2010   • Gestational diabetes 2014   • Gestational diabetes 2017   • Headache    • History of kidney stones    • History of medical problems     Tachycardia, varicose veins, chronic sinusitis   • Hyperlipidemia    • Hypertension     Elevated bp   • Kidney stone  and in    • Liver disease     Fatty liver   • Migraine    • Ovarian cyst    • PONV (postoperative nausea and vomiting)    • Recurrent pregnancy loss, antepartum condition or complication ,    • Tachycardia in pregnancy 2010    Never treated   • Varicella Childhood   • Vitamin D deficiency       Past Surgical History:   Procedure Laterality Date   • LASER ABLATION       "VERICOSE VEIN ABLATION      OB History          7    Para   4    Term   4       0    AB   2    Living   4         SAB   2    IAB   0    Ectopic   0    Molar   0    Multiple   0    Live Births   4          Obstetric Comments   2010 - Mario   - Maxim  2017 - Cornelia   - Preet  History of GDMA1 - all pregnancies   No history of abnormal pap smears   No history of STIs               Objective     Vital Signs  /86   Wt 67.9 kg (149 lb 12.8 oz)   LMP 11/15/2024   Estimated body mass index is 24.19 kg/m² as calculated from the following:    Height as of 25: 167.6 cm (65.98\").    Weight as of this encounter: 67.9 kg (149 lb 12.8 oz).    Physical Exam  Constitutional:       Appearance: Normal appearance. She is normal weight.   HENT:      Head: Normocephalic and atraumatic.   Cardiovascular:      Rate and Rhythm: Normal rate.   Pulmonary:      Effort: Pulmonary effort is normal.   Musculoskeletal:         General: Normal range of motion.      Cervical back: Normal range of motion and neck supple.   Neurological:      General: No focal deficit present.      Mental Status: She is alert and oriented to person, place, and time.   Psychiatric:         Mood and Affect: Mood normal.         Behavior: Behavior normal.         Thought Content: Thought content normal.         Judgment: Judgment normal.     Ultrasound Impression:   Vtx, S=D, low AUDRA at 5cms but 2x2cm pocket, anterior placenta, nl anatomy, BPP 8/8, UA Dopplers nl.    Assessment and Plan     Diagnoses and all orders for this visit:    1. High-risk pregnancy in third trimester (Primary)    2. Multigravida of advanced maternal age in third trimester    3. GBS carrier    4. GDM (gestational diabetes mellitus), class A1  Assessment & Plan:  Patient is checking blood sugars 4x/day and sending in logs. Her fasting values are borderline but her post-meal values are all essentially normal.     I had recommended patient start Metformin 500mgs XL " BID but patient wanted to try diet modification instead.      - We will continue to review her BG logs weekly      5. Elevated blood pressure affecting pregnancy in third trimester, antepartum  Assessment & Plan:  BP today is 130/86.       6. Poor fetal growth affecting management of mother in third trimester (AC < 10%)  Assessment & Plan:  Fetal growth is actually much better today. EFW 38th and AC 41st percentile.       7. Oligohydramnios, antepartum, single or unspecified fetus  Assessment & Plan:  AUDRA is 5cms today with a 2cm x 2cm pocket. Encouraged patient to rest and increase po hydration over the weekend.      - Follow-up scheduled on Monday/Tuesday for limited scan to look at AUDRA      8. 35 weeks gestation of pregnancy         Follow Up  Return in about 3 days (around 7/21/2025).    I spent 10 minutes caring for the patient on the day of service. This included: obtaining or reviewing a separately obtained medical history, reviewing patient records, performing a medically appropriate exam and/or evaluation, counseling or educating the patient/family/caregiver, ordering medications, labs, and/or procedures and documenting such in the medical record. This does not include time spent on review and interpretation of other tests such as fetal ultrasound or the performance of other procedures such as amniocentesis or CVS.      Sariah Delgado MD  07/18/2025

## 2025-07-18 NOTE — ASSESSMENT & PLAN NOTE
Patient is checking blood sugars 4x/day and sending in logs. Her fasting values are borderline but her post-meal values are all essentially normal.     I had recommended patient start Metformin 500mgs XL BID but patient wanted to try diet modification instead.      - We will continue to review her BG logs weekly

## 2025-07-22 ENCOUNTER — HOSPITAL ENCOUNTER (OUTPATIENT)
Dept: WOMENS IMAGING | Facility: HOSPITAL | Age: 40
Discharge: HOME | End: 2025-07-22
Admitting: OBSTETRICS & GYNECOLOGY
Payer: MEDICAID

## 2025-07-22 ENCOUNTER — OFFICE VISIT (OUTPATIENT)
Dept: OBSTETRICS AND GYNECOLOGY | Facility: HOSPITAL | Age: 40
End: 2025-07-22
Payer: MEDICAID

## 2025-07-22 VITALS — SYSTOLIC BLOOD PRESSURE: 128 MMHG | BODY MASS INDEX: 24.25 KG/M2 | WEIGHT: 150.2 LBS | DIASTOLIC BLOOD PRESSURE: 90 MMHG

## 2025-07-22 DIAGNOSIS — O24.410 GDM (GESTATIONAL DIABETES MELLITUS), CLASS A1: ICD-10-CM

## 2025-07-22 DIAGNOSIS — O36.5930 POOR FETAL GROWTH AFFECTING MANAGEMENT OF MOTHER IN THIRD TRIMESTER, SINGLE OR UNSPECIFIED FETUS: ICD-10-CM

## 2025-07-22 DIAGNOSIS — O09.523 MULTIGRAVIDA OF ADVANCED MATERNAL AGE IN THIRD TRIMESTER: Primary | ICD-10-CM

## 2025-07-22 DIAGNOSIS — O09.523 MULTIGRAVIDA OF ADVANCED MATERNAL AGE IN THIRD TRIMESTER: ICD-10-CM

## 2025-07-22 DIAGNOSIS — O41.00X0 OLIGOHYDRAMNIOS, ANTEPARTUM, SINGLE OR UNSPECIFIED FETUS: ICD-10-CM

## 2025-07-22 DIAGNOSIS — O16.3 ELEVATED BLOOD PRESSURE AFFECTING PREGNANCY IN THIRD TRIMESTER, ANTEPARTUM: Primary | ICD-10-CM

## 2025-07-22 LAB
EXPIRATION DATE: NORMAL
Lab: NORMAL
PROT UR STRIP-MCNC: NEGATIVE MG/DL

## 2025-07-22 PROCEDURE — 76819 FETAL BIOPHYS PROFIL W/O NST: CPT

## 2025-07-22 NOTE — PROGRESS NOTES
Denies vaginal bleeding, leaking fluid, and contractions.   Endorses normal fetal movement.  NIPT declined.  Next OB follow-up appointment with Dr. Maradiaga on 7/30/2025.

## 2025-07-23 NOTE — PROGRESS NOTES
[facility].    Please see ultrasound report under imaging tab of patient chart in Epic (Viewpoint report).    Olesya Griffith MD

## 2025-07-28 ENCOUNTER — ROUTINE PRENATAL (OUTPATIENT)
Dept: OBSTETRICS AND GYNECOLOGY | Facility: CLINIC | Age: 40
End: 2025-07-28
Payer: MEDICAID

## 2025-07-28 VITALS — BODY MASS INDEX: 24.22 KG/M2 | WEIGHT: 150 LBS | SYSTOLIC BLOOD PRESSURE: 130 MMHG | DIASTOLIC BLOOD PRESSURE: 80 MMHG

## 2025-07-28 DIAGNOSIS — O41.00X0 OLIGOHYDRAMNIOS, ANTEPARTUM, SINGLE OR UNSPECIFIED FETUS: ICD-10-CM

## 2025-07-28 DIAGNOSIS — O09.523 MULTIGRAVIDA OF ADVANCED MATERNAL AGE IN THIRD TRIMESTER: Primary | ICD-10-CM

## 2025-07-28 DIAGNOSIS — O24.410 GDM (GESTATIONAL DIABETES MELLITUS), CLASS A1: ICD-10-CM

## 2025-07-28 DIAGNOSIS — Z22.330 GBS CARRIER: ICD-10-CM

## 2025-07-28 LAB
GLUCOSE UR STRIP-MCNC: NEGATIVE MG/DL
PROT UR STRIP-MCNC: NEGATIVE MG/DL

## 2025-07-28 PROCEDURE — 99213 OFFICE O/P EST LOW 20 MIN: CPT

## 2025-07-28 NOTE — PROGRESS NOTES
Chief Complaint   Patient presents with    Routine Prenatal Visit     Bpp 8       HPI: Tasha is a  currently at 36w3d who today reports the following:  Contractions - YES - but less than 4/hour AND no associated change in vaginal discharge; Leaking - No; Vaginal bleeding -  No; Swelling of extremities - No.  She reports adequate fetal movements.   ROS:  GI: Nausea - No; Constipation - No; Diarrhea - No    Neuro: Headache - No; Visual change - No    Respiratory: Cough - No; SOB - No; fever - No     EXAM:  Vitals: See prenatal flowsheet   Abdomen: See prenatal flowsheet   Urine glucose/protein: See prenatal flowsheet   Pelvic: See prenatal flowsheet   MDM:   Impression: Supervision of high risk pregnancy  DM - GDMA1  IUGR (AC < 10%)  Oligohydramnios- AUDRA today 4.8   Tests done today: Urine dip for protein and glucose  BPP -    Topics discussed: Prenatal labs reviewed  Continue with Rx prenatal vitamins and OTC H2 blockers.  Labor signs and symptoms  Symptoms of preeclampsia  Maternal monitoring of fetal movement   Tests scheduled today for her next visit:   JOSE CARLOS

## 2025-07-31 ENCOUNTER — PREP FOR SURGERY (OUTPATIENT)
Dept: OTHER | Facility: HOSPITAL | Age: 40
End: 2025-07-31
Payer: MEDICAID

## 2025-07-31 ENCOUNTER — ROUTINE PRENATAL (OUTPATIENT)
Dept: OBSTETRICS AND GYNECOLOGY | Facility: CLINIC | Age: 40
End: 2025-07-31
Payer: MEDICAID

## 2025-07-31 ENCOUNTER — HOSPITAL ENCOUNTER (INPATIENT)
Facility: HOSPITAL | Age: 40
LOS: 3 days | Discharge: HOME | End: 2025-08-03
Attending: OBSTETRICS & GYNECOLOGY | Admitting: OBSTETRICS & GYNECOLOGY
Payer: MEDICAID

## 2025-07-31 VITALS — DIASTOLIC BLOOD PRESSURE: 88 MMHG | BODY MASS INDEX: 24.38 KG/M2 | SYSTOLIC BLOOD PRESSURE: 120 MMHG | WEIGHT: 151 LBS

## 2025-07-31 DIAGNOSIS — O09.523 MULTIGRAVIDA OF ADVANCED MATERNAL AGE IN THIRD TRIMESTER: ICD-10-CM

## 2025-07-31 DIAGNOSIS — O36.5930 POOR FETAL GROWTH AFFECTING MANAGEMENT OF MOTHER IN THIRD TRIMESTER, SINGLE OR UNSPECIFIED FETUS: ICD-10-CM

## 2025-07-31 DIAGNOSIS — O24.410 GDM (GESTATIONAL DIABETES MELLITUS), CLASS A1: ICD-10-CM

## 2025-07-31 DIAGNOSIS — O41.00X0 OLIGOHYDRAMNIOS, ANTEPARTUM, SINGLE OR UNSPECIFIED FETUS: ICD-10-CM

## 2025-07-31 DIAGNOSIS — O09.93 HIGH-RISK PREGNANCY IN THIRD TRIMESTER: Primary | ICD-10-CM

## 2025-07-31 DIAGNOSIS — Z36.85 ANTENATAL SCREENING FOR STREPTOCOCCUS B: ICD-10-CM

## 2025-07-31 PROBLEM — O16.3 ELEVATED BLOOD PRESSURE AFFECTING PREGNANCY IN THIRD TRIMESTER, ANTEPARTUM: Status: RESOLVED | Noted: 2025-06-26 | Resolved: 2025-07-31

## 2025-07-31 PROBLEM — O41.03X0 OLIGOHYDRAMNIOS IN THIRD TRIMESTER: Status: ACTIVE | Noted: 2025-07-18

## 2025-07-31 LAB
ABO GROUP BLD: NORMAL
ALBUMIN SERPL-MCNC: 3.5 G/DL (ref 3.5–5.2)
ALBUMIN/GLOB SERPL: 1.3 G/DL
ALP SERPL-CCNC: 135 U/L (ref 39–117)
ALT SERPL W P-5'-P-CCNC: 13 U/L (ref 1–33)
ANION GAP SERPL CALCULATED.3IONS-SCNC: 10 MMOL/L (ref 5–15)
AST SERPL-CCNC: 19 U/L (ref 1–32)
BILIRUB SERPL-MCNC: 0.2 MG/DL (ref 0–1.2)
BLD GP AB SCN SERPL QL: NEGATIVE
BUN SERPL-MCNC: 11.2 MG/DL (ref 6–20)
BUN/CREAT SERPL: 18.4 (ref 7–25)
CALCIUM SPEC-SCNC: 8.6 MG/DL (ref 8.6–10.5)
CHLORIDE SERPL-SCNC: 103 MMOL/L (ref 98–107)
CO2 SERPL-SCNC: 22 MMOL/L (ref 22–29)
CREAT SERPL-MCNC: 0.61 MG/DL (ref 0.57–1)
DEPRECATED RDW RBC AUTO: 41.7 FL (ref 37–54)
EGFRCR SERPLBLD CKD-EPI 2021: 116.8 ML/MIN/1.73
ERYTHROCYTE [DISTWIDTH] IN BLOOD BY AUTOMATED COUNT: 13.2 % (ref 12.3–15.4)
GLOBULIN UR ELPH-MCNC: 2.7 GM/DL
GLUCOSE BLDC GLUCOMTR-MCNC: 129 MG/DL (ref 70–130)
GLUCOSE SERPL-MCNC: 97 MG/DL (ref 65–99)
HCT VFR BLD AUTO: 28.1 % (ref 34–46.6)
HGB BLD-MCNC: 9.2 G/DL (ref 12–15.9)
LDH SERPL-CCNC: 191 U/L (ref 135–214)
MCH RBC QN AUTO: 28.4 PG (ref 26.6–33)
MCHC RBC AUTO-ENTMCNC: 32.7 G/DL (ref 31.5–35.7)
MCV RBC AUTO: 86.7 FL (ref 79–97)
PLATELET # BLD AUTO: 152 10*3/MM3 (ref 140–450)
PMV BLD AUTO: 10 FL (ref 6–12)
POTASSIUM SERPL-SCNC: 4.2 MMOL/L (ref 3.5–5.2)
PROT SERPL-MCNC: 6.2 G/DL (ref 6–8.5)
RBC # BLD AUTO: 3.24 10*6/MM3 (ref 3.77–5.28)
RH BLD: POSITIVE
SODIUM SERPL-SCNC: 135 MMOL/L (ref 136–145)
T&S EXPIRATION DATE: NORMAL
URATE SERPL-MCNC: 5.7 MG/DL (ref 2.4–5.7)
WBC NRBC COR # BLD AUTO: 8.57 10*3/MM3 (ref 3.4–10.8)

## 2025-07-31 PROCEDURE — 25810000003 LACTATED RINGERS PER 1000 ML: Performed by: OBSTETRICS & GYNECOLOGY

## 2025-07-31 PROCEDURE — 86901 BLOOD TYPING SEROLOGIC RH(D): CPT | Performed by: OBSTETRICS & GYNECOLOGY

## 2025-07-31 PROCEDURE — 80053 COMPREHEN METABOLIC PANEL: CPT | Performed by: OBSTETRICS & GYNECOLOGY

## 2025-07-31 PROCEDURE — 86850 RBC ANTIBODY SCREEN: CPT | Performed by: OBSTETRICS & GYNECOLOGY

## 2025-07-31 PROCEDURE — 83615 LACTATE (LD) (LDH) ENZYME: CPT | Performed by: OBSTETRICS & GYNECOLOGY

## 2025-07-31 PROCEDURE — 86900 BLOOD TYPING SEROLOGIC ABO: CPT | Performed by: OBSTETRICS & GYNECOLOGY

## 2025-07-31 PROCEDURE — 25010000002 PENICILLIN G POTASSIUM PER 600000 UNITS: Performed by: OBSTETRICS & GYNECOLOGY

## 2025-07-31 PROCEDURE — 82948 REAGENT STRIP/BLOOD GLUCOSE: CPT

## 2025-07-31 PROCEDURE — 82570 ASSAY OF URINE CREATININE: CPT | Performed by: OBSTETRICS & GYNECOLOGY

## 2025-07-31 PROCEDURE — 84550 ASSAY OF BLOOD/URIC ACID: CPT | Performed by: OBSTETRICS & GYNECOLOGY

## 2025-07-31 PROCEDURE — 3E0DXGC INTRODUCTION OF OTHER THERAPEUTIC SUBSTANCE INTO MOUTH AND PHARYNX, EXTERNAL APPROACH: ICD-10-PCS | Performed by: OBSTETRICS & GYNECOLOGY

## 2025-07-31 PROCEDURE — 84156 ASSAY OF PROTEIN URINE: CPT | Performed by: OBSTETRICS & GYNECOLOGY

## 2025-07-31 PROCEDURE — 59025 FETAL NON-STRESS TEST: CPT

## 2025-07-31 PROCEDURE — 85027 COMPLETE CBC AUTOMATED: CPT | Performed by: OBSTETRICS & GYNECOLOGY

## 2025-07-31 PROCEDURE — 86780 TREPONEMA PALLIDUM: CPT | Performed by: OBSTETRICS & GYNECOLOGY

## 2025-07-31 RX ORDER — SODIUM CHLORIDE 0.9 % (FLUSH) 0.9 %
10 SYRINGE (ML) INJECTION EVERY 12 HOURS SCHEDULED
Status: CANCELLED | OUTPATIENT
Start: 2025-07-31

## 2025-07-31 RX ORDER — OXYCODONE AND ACETAMINOPHEN 5; 325 MG/1; MG/1
1 TABLET ORAL EVERY 4 HOURS PRN
Refills: 0 | Status: CANCELLED | OUTPATIENT
Start: 2025-07-31 | End: 2025-08-10

## 2025-07-31 RX ORDER — ACETAMINOPHEN 325 MG/1
650 TABLET ORAL EVERY 4 HOURS PRN
Status: DISCONTINUED | OUTPATIENT
Start: 2025-07-31 | End: 2025-08-01 | Stop reason: HOSPADM

## 2025-07-31 RX ORDER — OXYTOCIN/0.9 % SODIUM CHLORIDE 30/500 ML
250 PLASTIC BAG, INJECTION (ML) INTRAVENOUS CONTINUOUS
Status: CANCELLED | OUTPATIENT
Start: 2025-07-31 | End: 2025-07-31

## 2025-07-31 RX ORDER — BUTORPHANOL TARTRATE 1 MG/ML
1 INJECTION, SOLUTION INTRAMUSCULAR; INTRAVENOUS
Status: DISCONTINUED | OUTPATIENT
Start: 2025-07-31 | End: 2025-08-01 | Stop reason: HOSPADM

## 2025-07-31 RX ORDER — MISOPROSTOL 100 MCG
50 TABLET ORAL ONCE
Status: COMPLETED | OUTPATIENT
Start: 2025-08-01 | End: 2025-08-01

## 2025-07-31 RX ORDER — METHYLERGONOVINE MALEATE 0.2 MG/ML
200 INJECTION INTRAVENOUS AS NEEDED
Status: CANCELLED | OUTPATIENT
Start: 2025-07-31

## 2025-07-31 RX ORDER — MISOPROSTOL 200 UG/1
800 TABLET ORAL AS NEEDED
Status: CANCELLED | OUTPATIENT
Start: 2025-07-31

## 2025-07-31 RX ORDER — ACETAMINOPHEN 325 MG/1
650 TABLET ORAL EVERY 4 HOURS PRN
Status: CANCELLED | OUTPATIENT
Start: 2025-07-31

## 2025-07-31 RX ORDER — PENICILLIN G 3000000 [IU]/50ML
3 INJECTION, SOLUTION INTRAVENOUS EVERY 4 HOURS
Status: CANCELLED | OUTPATIENT
Start: 2025-07-31 | End: 2025-08-02

## 2025-07-31 RX ORDER — IBUPROFEN 600 MG/1
600 TABLET, FILM COATED ORAL EVERY 6 HOURS PRN
Status: CANCELLED | OUTPATIENT
Start: 2025-07-31

## 2025-07-31 RX ORDER — ONDANSETRON 4 MG/1
4 TABLET, ORALLY DISINTEGRATING ORAL EVERY 6 HOURS PRN
Status: CANCELLED | OUTPATIENT
Start: 2025-07-31

## 2025-07-31 RX ORDER — OXYTOCIN/0.9 % SODIUM CHLORIDE 30/500 ML
2-24 PLASTIC BAG, INJECTION (ML) INTRAVENOUS
Status: DISCONTINUED | OUTPATIENT
Start: 2025-08-01 | End: 2025-08-01 | Stop reason: HOSPADM

## 2025-07-31 RX ORDER — BUTORPHANOL TARTRATE 2 MG/ML
2 INJECTION, SOLUTION INTRAMUSCULAR; INTRAVENOUS
Status: DISCONTINUED | OUTPATIENT
Start: 2025-07-31 | End: 2025-08-01 | Stop reason: HOSPADM

## 2025-07-31 RX ORDER — MAGNESIUM CARB/ALUMINUM HYDROX 105-160MG
30 TABLET,CHEWABLE ORAL ONCE
Status: CANCELLED | OUTPATIENT
Start: 2025-07-31 | End: 2025-07-31

## 2025-07-31 RX ORDER — PROMETHAZINE HYDROCHLORIDE 12.5 MG/1
12.5 SUPPOSITORY RECTAL EVERY 6 HOURS PRN
Status: CANCELLED | OUTPATIENT
Start: 2025-07-31

## 2025-07-31 RX ORDER — MISOPROSTOL 100 MCG
50 TABLET ORAL
Status: CANCELLED | OUTPATIENT
Start: 2025-07-31 | End: 2025-07-31

## 2025-07-31 RX ORDER — SODIUM CHLORIDE 0.9 % (FLUSH) 0.9 %
10 SYRINGE (ML) INJECTION AS NEEDED
Status: CANCELLED | OUTPATIENT
Start: 2025-07-31

## 2025-07-31 RX ORDER — CARBOPROST TROMETHAMINE 250 UG/ML
250 INJECTION, SOLUTION INTRAMUSCULAR AS NEEDED
Status: CANCELLED | OUTPATIENT
Start: 2025-07-31

## 2025-07-31 RX ORDER — PENICILLIN G 3000000 [IU]/50ML
3 INJECTION, SOLUTION INTRAVENOUS EVERY 4 HOURS
Status: DISCONTINUED | OUTPATIENT
Start: 2025-08-01 | End: 2025-08-01 | Stop reason: HOSPADM

## 2025-07-31 RX ORDER — SODIUM CHLORIDE 9 MG/ML
40 INJECTION, SOLUTION INTRAVENOUS AS NEEDED
Status: DISCONTINUED | OUTPATIENT
Start: 2025-07-31 | End: 2025-08-01 | Stop reason: HOSPADM

## 2025-07-31 RX ORDER — LIDOCAINE HYDROCHLORIDE 10 MG/ML
0.5 INJECTION, SOLUTION EPIDURAL; INFILTRATION; INTRACAUDAL; PERINEURAL ONCE AS NEEDED
Status: DISCONTINUED | OUTPATIENT
Start: 2025-07-31 | End: 2025-08-01 | Stop reason: HOSPADM

## 2025-07-31 RX ORDER — SODIUM CHLORIDE 0.9 % (FLUSH) 0.9 %
10 SYRINGE (ML) INJECTION EVERY 12 HOURS SCHEDULED
Status: DISCONTINUED | OUTPATIENT
Start: 2025-07-31 | End: 2025-07-31

## 2025-07-31 RX ORDER — BUTORPHANOL TARTRATE 2 MG/ML
2 INJECTION, SOLUTION INTRAMUSCULAR; INTRAVENOUS
Status: CANCELLED | OUTPATIENT
Start: 2025-07-31

## 2025-07-31 RX ORDER — MAGNESIUM CARB/ALUMINUM HYDROX 105-160MG
30 TABLET,CHEWABLE ORAL ONCE
Status: DISCONTINUED | OUTPATIENT
Start: 2025-07-31 | End: 2025-08-01 | Stop reason: HOSPADM

## 2025-07-31 RX ORDER — OXYTOCIN/0.9 % SODIUM CHLORIDE 30/500 ML
2-24 PLASTIC BAG, INJECTION (ML) INTRAVENOUS
Status: DISCONTINUED | OUTPATIENT
Start: 2025-08-01 | End: 2025-07-31

## 2025-07-31 RX ORDER — HYDROXYZINE HYDROCHLORIDE 25 MG/1
50 TABLET, FILM COATED ORAL NIGHTLY PRN
Status: DISCONTINUED | OUTPATIENT
Start: 2025-07-31 | End: 2025-08-01 | Stop reason: HOSPADM

## 2025-07-31 RX ORDER — PROMETHAZINE HYDROCHLORIDE 12.5 MG/1
12.5 TABLET ORAL EVERY 6 HOURS PRN
Status: CANCELLED | OUTPATIENT
Start: 2025-07-31

## 2025-07-31 RX ORDER — BUTORPHANOL TARTRATE 1 MG/ML
1 INJECTION, SOLUTION INTRAMUSCULAR; INTRAVENOUS
Status: CANCELLED | OUTPATIENT
Start: 2025-07-31

## 2025-07-31 RX ORDER — MISOPROSTOL 100 MCG
50 TABLET ORAL
Status: DISCONTINUED | OUTPATIENT
Start: 2025-07-31 | End: 2025-07-31

## 2025-07-31 RX ORDER — SODIUM CHLORIDE 9 MG/ML
40 INJECTION, SOLUTION INTRAVENOUS AS NEEDED
Status: CANCELLED | OUTPATIENT
Start: 2025-07-31

## 2025-07-31 RX ORDER — HYDROXYZINE HYDROCHLORIDE 25 MG/1
50 TABLET, FILM COATED ORAL NIGHTLY PRN
Status: CANCELLED | OUTPATIENT
Start: 2025-07-31

## 2025-07-31 RX ORDER — OXYTOCIN/0.9 % SODIUM CHLORIDE 30/500 ML
2-24 PLASTIC BAG, INJECTION (ML) INTRAVENOUS
Status: CANCELLED | OUTPATIENT
Start: 2025-08-01

## 2025-07-31 RX ORDER — SODIUM CHLORIDE, SODIUM LACTATE, POTASSIUM CHLORIDE, CALCIUM CHLORIDE 600; 310; 30; 20 MG/100ML; MG/100ML; MG/100ML; MG/100ML
125 INJECTION, SOLUTION INTRAVENOUS CONTINUOUS
Status: ACTIVE | OUTPATIENT
Start: 2025-07-31 | End: 2025-08-01

## 2025-07-31 RX ORDER — OXYTOCIN/0.9 % SODIUM CHLORIDE 30/500 ML
999 PLASTIC BAG, INJECTION (ML) INTRAVENOUS ONCE
Status: CANCELLED | OUTPATIENT
Start: 2025-07-31 | End: 2025-07-31

## 2025-07-31 RX ORDER — SODIUM CHLORIDE 0.9 % (FLUSH) 0.9 %
10 SYRINGE (ML) INJECTION AS NEEDED
Status: DISCONTINUED | OUTPATIENT
Start: 2025-07-31 | End: 2025-08-01 | Stop reason: HOSPADM

## 2025-07-31 RX ORDER — ONDANSETRON 2 MG/ML
4 INJECTION INTRAMUSCULAR; INTRAVENOUS EVERY 6 HOURS PRN
Status: CANCELLED | OUTPATIENT
Start: 2025-07-31

## 2025-07-31 RX ORDER — LIDOCAINE HYDROCHLORIDE 10 MG/ML
0.5 INJECTION, SOLUTION EPIDURAL; INFILTRATION; INTRACAUDAL; PERINEURAL ONCE AS NEEDED
Status: CANCELLED | OUTPATIENT
Start: 2025-07-31

## 2025-07-31 RX ORDER — SODIUM CHLORIDE, SODIUM LACTATE, POTASSIUM CHLORIDE, CALCIUM CHLORIDE 600; 310; 30; 20 MG/100ML; MG/100ML; MG/100ML; MG/100ML
125 INJECTION, SOLUTION INTRAVENOUS CONTINUOUS
Status: CANCELLED | OUTPATIENT
Start: 2025-07-31 | End: 2025-08-01

## 2025-07-31 RX ADMIN — SODIUM CHLORIDE 5 MILLION UNITS: 900 INJECTION INTRAVENOUS at 21:54

## 2025-07-31 RX ADMIN — SODIUM CHLORIDE, POTASSIUM CHLORIDE, SODIUM LACTATE AND CALCIUM CHLORIDE 125 ML/HR: 600; 310; 30; 20 INJECTION, SOLUTION INTRAVENOUS at 21:10

## 2025-07-31 NOTE — PROGRESS NOTES
Chief Complaint   Patient presents with    Routine Prenatal Visit     NST started at 1247       HPI: Tasha is a  currently at 36w6d who today reports the following:  Contractions - No; Leaking - No; Vaginal bleeding -  No; Swelling of extremities - No.    ROS:  GI: Nausea - No; Constipation - No; Diarrhea - No    Neuro: Headache - No; Visual change - No      EXAM:  Vitals: See prenatal flowsheet   Abdomen: See prenatal flowsheet   Urine glucose/protein: See prenatal flowsheet   Pelvic: See prenatal flowsheet  Non Stress Test      Patient: Tasha Mckinney  : 1985  MRN: [id number]  CSN: [id number]  Date: 2025    Estimated Date of Delivery: 25  Gestational Age: 36w6d    Indication for NST Low fluid  Total time > 20 minutes                    Interpretation    Baseline  beats per minute   Accelerations  Present   Decelerations Absent       Additional Comments Reactive and reassuring        Recommendations for f/u See above        This note has been electronically signed.    Marie Maradiaga MD       MDM:   Impression: Supervision of high risk pregnancy  Oligohydramnios   History of IUGR this pregnancy    Tests done today: Urine dip for protein and glucose  BPP - 8 / 10  NST - reactive   Topics discussed: Continue with PNV's  Prenatal labs reviewed  Recommend proceeding with delivery with IOL given oligohydramnios - AUDRA 4..2 cm and no 2 x 2 cm pocket visualized today. Patient to return for IOL.

## 2025-08-01 PROBLEM — Z22.330 GBS CARRIER: Status: RESOLVED | Noted: 2025-04-17 | Resolved: 2025-08-01

## 2025-08-01 PROBLEM — O09.93 HIGH-RISK PREGNANCY IN THIRD TRIMESTER: Status: RESOLVED | Noted: 2025-02-13 | Resolved: 2025-08-01

## 2025-08-01 PROBLEM — O09.523 MULTIGRAVIDA OF ADVANCED MATERNAL AGE IN THIRD TRIMESTER: Status: RESOLVED | Noted: 2025-02-13 | Resolved: 2025-08-01

## 2025-08-01 PROBLEM — O23.42 UTI IN PREGNANCY, ANTEPARTUM, SECOND TRIMESTER: Status: RESOLVED | Noted: 2025-02-14 | Resolved: 2025-08-01

## 2025-08-01 PROBLEM — O41.03X0 OLIGOHYDRAMNIOS IN THIRD TRIMESTER: Status: RESOLVED | Noted: 2025-07-18 | Resolved: 2025-08-01

## 2025-08-01 PROBLEM — O24.410 GDM (GESTATIONAL DIABETES MELLITUS), CLASS A1: Status: RESOLVED | Noted: 2025-06-16 | Resolved: 2025-08-01

## 2025-08-01 LAB
CREAT UR-MCNC: 20.3 MG/DL
GLUCOSE BLDC GLUCOMTR-MCNC: 109 MG/DL (ref 70–130)
GLUCOSE BLDC GLUCOMTR-MCNC: 120 MG/DL (ref 70–130)
PROT ?TM UR-MCNC: <4 MG/DL
PROT/CREAT UR: NORMAL MG/G{CREAT}
TREPONEMA PALLIDUM IGG+IGM AB [PRESENCE] IN SERUM OR PLASMA BY IMMUNOASSAY: NORMAL

## 2025-08-01 PROCEDURE — 0KQM0ZZ REPAIR PERINEUM MUSCLE, OPEN APPROACH: ICD-10-PCS | Performed by: OBSTETRICS & GYNECOLOGY

## 2025-08-01 PROCEDURE — 25010000002 PENICILLIN G POTASSIUM PER 600000 UNITS: Performed by: OBSTETRICS & GYNECOLOGY

## 2025-08-01 PROCEDURE — 82948 REAGENT STRIP/BLOOD GLUCOSE: CPT

## 2025-08-01 PROCEDURE — 59410 OBSTETRICAL CARE: CPT | Performed by: OBSTETRICS & GYNECOLOGY

## 2025-08-01 PROCEDURE — 25810000003 LACTATED RINGERS PER 1000 ML: Performed by: OBSTETRICS & GYNECOLOGY

## 2025-08-01 PROCEDURE — 3E033VJ INTRODUCTION OF OTHER HORMONE INTO PERIPHERAL VEIN, PERCUTANEOUS APPROACH: ICD-10-PCS | Performed by: OBSTETRICS & GYNECOLOGY

## 2025-08-01 PROCEDURE — 25010000002 ONDANSETRON PER 1 MG: Performed by: OBSTETRICS & GYNECOLOGY

## 2025-08-01 PROCEDURE — 25010000002 BUTORPHANOL PER 1 MG: Performed by: OBSTETRICS & GYNECOLOGY

## 2025-08-01 RX ORDER — OXYTOCIN/0.9 % SODIUM CHLORIDE 30/500 ML
125 PLASTIC BAG, INJECTION (ML) INTRAVENOUS ONCE AS NEEDED
Status: DISCONTINUED | OUTPATIENT
Start: 2025-08-01 | End: 2025-08-03 | Stop reason: HOSPADM

## 2025-08-01 RX ORDER — METHYLERGONOVINE MALEATE 0.2 MG/ML
200 INJECTION INTRAVENOUS AS NEEDED
Status: DISCONTINUED | OUTPATIENT
Start: 2025-08-01 | End: 2025-08-01 | Stop reason: HOSPADM

## 2025-08-01 RX ORDER — BISACODYL 10 MG
10 SUPPOSITORY, RECTAL RECTAL DAILY PRN
Status: DISCONTINUED | OUTPATIENT
Start: 2025-08-02 | End: 2025-08-03 | Stop reason: HOSPADM

## 2025-08-01 RX ORDER — ACETAMINOPHEN 325 MG/1
650 TABLET ORAL EVERY 6 HOURS PRN
Status: DISCONTINUED | OUTPATIENT
Start: 2025-08-01 | End: 2025-08-03 | Stop reason: HOSPADM

## 2025-08-01 RX ORDER — PROMETHAZINE HYDROCHLORIDE 12.5 MG/1
12.5 TABLET ORAL EVERY 6 HOURS PRN
Status: DISCONTINUED | OUTPATIENT
Start: 2025-08-01 | End: 2025-08-01 | Stop reason: HOSPADM

## 2025-08-01 RX ORDER — OXYCODONE AND ACETAMINOPHEN 5; 325 MG/1; MG/1
1 TABLET ORAL EVERY 4 HOURS PRN
Status: DISCONTINUED | OUTPATIENT
Start: 2025-08-01 | End: 2025-08-01 | Stop reason: HOSPADM

## 2025-08-01 RX ORDER — OXYTOCIN/0.9 % SODIUM CHLORIDE 30/500 ML
250 PLASTIC BAG, INJECTION (ML) INTRAVENOUS CONTINUOUS
Status: ACTIVE | OUTPATIENT
Start: 2025-08-01 | End: 2025-08-01

## 2025-08-01 RX ORDER — ONDANSETRON 2 MG/ML
4 INJECTION INTRAMUSCULAR; INTRAVENOUS EVERY 6 HOURS PRN
Status: DISCONTINUED | OUTPATIENT
Start: 2025-08-01 | End: 2025-08-01 | Stop reason: HOSPADM

## 2025-08-01 RX ORDER — MISOPROSTOL 200 UG/1
800 TABLET ORAL AS NEEDED
Status: DISCONTINUED | OUTPATIENT
Start: 2025-08-01 | End: 2025-08-01 | Stop reason: HOSPADM

## 2025-08-01 RX ORDER — IBUPROFEN 600 MG/1
600 TABLET, FILM COATED ORAL EVERY 6 HOURS PRN
Status: DISCONTINUED | OUTPATIENT
Start: 2025-08-01 | End: 2025-08-03 | Stop reason: HOSPADM

## 2025-08-01 RX ORDER — HYDROCODONE BITARTRATE AND ACETAMINOPHEN 5; 325 MG/1; MG/1
1 TABLET ORAL EVERY 4 HOURS PRN
Status: DISCONTINUED | OUTPATIENT
Start: 2025-08-01 | End: 2025-08-03 | Stop reason: HOSPADM

## 2025-08-01 RX ORDER — HYDROCORTISONE 25 MG/G
1 CREAM TOPICAL AS NEEDED
Status: DISCONTINUED | OUTPATIENT
Start: 2025-08-01 | End: 2025-08-03 | Stop reason: HOSPADM

## 2025-08-01 RX ORDER — DIPHENHYDRAMINE HCL 25 MG
25 CAPSULE ORAL NIGHTLY PRN
Status: DISCONTINUED | OUTPATIENT
Start: 2025-08-01 | End: 2025-08-03 | Stop reason: HOSPADM

## 2025-08-01 RX ORDER — DOCUSATE SODIUM 100 MG/1
100 CAPSULE, LIQUID FILLED ORAL 2 TIMES DAILY
Status: DISCONTINUED | OUTPATIENT
Start: 2025-08-01 | End: 2025-08-03 | Stop reason: HOSPADM

## 2025-08-01 RX ORDER — IBUPROFEN 600 MG/1
600 TABLET, FILM COATED ORAL EVERY 6 HOURS PRN
Status: DISCONTINUED | OUTPATIENT
Start: 2025-08-01 | End: 2025-08-01 | Stop reason: HOSPADM

## 2025-08-01 RX ORDER — ONDANSETRON 4 MG/1
4 TABLET, ORALLY DISINTEGRATING ORAL EVERY 6 HOURS PRN
Status: DISCONTINUED | OUTPATIENT
Start: 2025-08-01 | End: 2025-08-01 | Stop reason: HOSPADM

## 2025-08-01 RX ORDER — LIDOCAINE HYDROCHLORIDE 10 MG/ML
INJECTION, SOLUTION EPIDURAL; INFILTRATION; INTRACAUDAL; PERINEURAL
Status: DISCONTINUED
Start: 2025-08-01 | End: 2025-08-03 | Stop reason: HOSPADM

## 2025-08-01 RX ORDER — PROMETHAZINE HYDROCHLORIDE 12.5 MG/1
12.5 SUPPOSITORY RECTAL EVERY 6 HOURS PRN
Status: DISCONTINUED | OUTPATIENT
Start: 2025-08-01 | End: 2025-08-01 | Stop reason: HOSPADM

## 2025-08-01 RX ORDER — FAMOTIDINE 20 MG/1
20 TABLET, FILM COATED ORAL 2 TIMES DAILY
Status: DISCONTINUED | OUTPATIENT
Start: 2025-08-01 | End: 2025-08-03 | Stop reason: HOSPADM

## 2025-08-01 RX ORDER — CARBOPROST TROMETHAMINE 250 UG/ML
250 INJECTION, SOLUTION INTRAMUSCULAR AS NEEDED
Status: DISCONTINUED | OUTPATIENT
Start: 2025-08-01 | End: 2025-08-01 | Stop reason: HOSPADM

## 2025-08-01 RX ORDER — OXYTOCIN/0.9 % SODIUM CHLORIDE 30/500 ML
999 PLASTIC BAG, INJECTION (ML) INTRAVENOUS ONCE
Status: COMPLETED | OUTPATIENT
Start: 2025-08-01 | End: 2025-08-01

## 2025-08-01 RX ORDER — HYDROCODONE BITARTRATE AND ACETAMINOPHEN 10; 325 MG/1; MG/1
1 TABLET ORAL EVERY 4 HOURS PRN
Status: DISCONTINUED | OUTPATIENT
Start: 2025-08-01 | End: 2025-08-03 | Stop reason: HOSPADM

## 2025-08-01 RX ORDER — SODIUM CHLORIDE 0.9 % (FLUSH) 0.9 %
1-10 SYRINGE (ML) INJECTION AS NEEDED
Status: DISCONTINUED | OUTPATIENT
Start: 2025-08-01 | End: 2025-08-03 | Stop reason: HOSPADM

## 2025-08-01 RX ADMIN — IBUPROFEN 600 MG: 600 TABLET, FILM COATED ORAL at 06:29

## 2025-08-01 RX ADMIN — Medication 999 ML/HR: at 05:00

## 2025-08-01 RX ADMIN — WITCH HAZEL: 500 SOLUTION RECTAL; TOPICAL at 08:58

## 2025-08-01 RX ADMIN — Medication: at 08:58

## 2025-08-01 RX ADMIN — Medication 50 MCG: at 01:07

## 2025-08-01 RX ADMIN — ONDANSETRON 4 MG: 2 INJECTION INTRAMUSCULAR; INTRAVENOUS at 05:10

## 2025-08-01 RX ADMIN — IBUPROFEN 600 MG: 600 TABLET ORAL at 17:24

## 2025-08-01 RX ADMIN — SODIUM CHLORIDE, POTASSIUM CHLORIDE, SODIUM LACTATE AND CALCIUM CHLORIDE 125 ML/HR: 600; 310; 30; 20 INJECTION, SOLUTION INTRAVENOUS at 01:05

## 2025-08-01 RX ADMIN — BUTORPHANOL TARTRATE 1 MG: 1 INJECTION, SOLUTION INTRAMUSCULAR; INTRAVENOUS at 03:37

## 2025-08-01 RX ADMIN — PENICILLIN G 3 MILLION UNITS: 3000000 INJECTION, SOLUTION INTRAVENOUS at 01:05

## 2025-08-02 LAB
ALP SERPL-CCNC: 115 U/L (ref 39–117)
ALT SERPL W P-5'-P-CCNC: 15 U/L (ref 1–33)
AST SERPL-CCNC: 25 U/L (ref 1–32)
BASOPHILS # BLD AUTO: 0.05 10*3/MM3 (ref 0–0.2)
BASOPHILS NFR BLD AUTO: 0.5 % (ref 0–1.5)
BILIRUB SERPL-MCNC: <0.2 MG/DL (ref 0–1.2)
CREAT SERPL-MCNC: 0.74 MG/DL (ref 0.57–1)
DEPRECATED RDW RBC AUTO: 42.5 FL (ref 37–54)
EOSINOPHIL # BLD AUTO: 0.17 10*3/MM3 (ref 0–0.4)
EOSINOPHIL NFR BLD AUTO: 1.8 % (ref 0.3–6.2)
ERYTHROCYTE [DISTWIDTH] IN BLOOD BY AUTOMATED COUNT: 13.2 % (ref 12.3–15.4)
HCT VFR BLD AUTO: 27.9 % (ref 34–46.6)
HGB BLD-MCNC: 8.9 G/DL (ref 12–15.9)
IMM GRANULOCYTES # BLD AUTO: 0.03 10*3/MM3 (ref 0–0.05)
IMM GRANULOCYTES NFR BLD AUTO: 0.3 % (ref 0–0.5)
LDH SERPL-CCNC: 219 U/L (ref 135–214)
LYMPHOCYTES # BLD AUTO: 2.34 10*3/MM3 (ref 0.7–3.1)
LYMPHOCYTES NFR BLD AUTO: 24.2 % (ref 19.6–45.3)
MCH RBC QN AUTO: 28.3 PG (ref 26.6–33)
MCHC RBC AUTO-ENTMCNC: 31.9 G/DL (ref 31.5–35.7)
MCV RBC AUTO: 88.6 FL (ref 79–97)
MONOCYTES # BLD AUTO: 0.57 10*3/MM3 (ref 0.1–0.9)
MONOCYTES NFR BLD AUTO: 5.9 % (ref 5–12)
NEUTROPHILS NFR BLD AUTO: 6.49 10*3/MM3 (ref 1.7–7)
NEUTROPHILS NFR BLD AUTO: 67.3 % (ref 42.7–76)
NRBC BLD AUTO-RTO: 0 /100 WBC (ref 0–0.2)
PLATELET # BLD AUTO: 165 10*3/MM3 (ref 140–450)
PMV BLD AUTO: 10 FL (ref 6–12)
RBC # BLD AUTO: 3.15 10*6/MM3 (ref 3.77–5.28)
URATE SERPL-MCNC: 5.2 MG/DL (ref 2.4–5.7)
WBC NRBC COR # BLD AUTO: 9.65 10*3/MM3 (ref 3.4–10.8)

## 2025-08-02 PROCEDURE — 84450 TRANSFERASE (AST) (SGOT): CPT | Performed by: STUDENT IN AN ORGANIZED HEALTH CARE EDUCATION/TRAINING PROGRAM

## 2025-08-02 PROCEDURE — 82565 ASSAY OF CREATININE: CPT | Performed by: STUDENT IN AN ORGANIZED HEALTH CARE EDUCATION/TRAINING PROGRAM

## 2025-08-02 PROCEDURE — 85025 COMPLETE CBC W/AUTO DIFF WBC: CPT | Performed by: OBSTETRICS & GYNECOLOGY

## 2025-08-02 PROCEDURE — 84460 ALANINE AMINO (ALT) (SGPT): CPT | Performed by: STUDENT IN AN ORGANIZED HEALTH CARE EDUCATION/TRAINING PROGRAM

## 2025-08-02 PROCEDURE — 83615 LACTATE (LD) (LDH) ENZYME: CPT | Performed by: STUDENT IN AN ORGANIZED HEALTH CARE EDUCATION/TRAINING PROGRAM

## 2025-08-02 PROCEDURE — 84550 ASSAY OF BLOOD/URIC ACID: CPT | Performed by: STUDENT IN AN ORGANIZED HEALTH CARE EDUCATION/TRAINING PROGRAM

## 2025-08-02 PROCEDURE — 84075 ASSAY ALKALINE PHOSPHATASE: CPT | Performed by: STUDENT IN AN ORGANIZED HEALTH CARE EDUCATION/TRAINING PROGRAM

## 2025-08-02 PROCEDURE — 82247 BILIRUBIN TOTAL: CPT | Performed by: STUDENT IN AN ORGANIZED HEALTH CARE EDUCATION/TRAINING PROGRAM

## 2025-08-02 RX ORDER — NIFEDIPINE 30 MG/1
30 TABLET, EXTENDED RELEASE ORAL
Status: DISCONTINUED | OUTPATIENT
Start: 2025-08-02 | End: 2025-08-03 | Stop reason: HOSPADM

## 2025-08-02 RX ORDER — FERROUS SULFATE 325(65) MG
325 TABLET ORAL
Status: DISCONTINUED | OUTPATIENT
Start: 2025-08-02 | End: 2025-08-03 | Stop reason: HOSPADM

## 2025-08-02 RX ADMIN — FERROUS SULFATE TAB 325 MG (65 MG ELEMENTAL FE) 325 MG: 325 (65 FE) TAB at 12:22

## 2025-08-02 RX ADMIN — FAMOTIDINE 20 MG: 20 TABLET, FILM COATED ORAL at 10:00

## 2025-08-02 RX ADMIN — WITCH HAZEL: 500 SOLUTION RECTAL; TOPICAL at 23:03

## 2025-08-02 RX ADMIN — NIFEDIPINE 30 MG: 30 TABLET, EXTENDED RELEASE ORAL at 10:00

## 2025-08-02 RX ADMIN — DOCUSATE SODIUM 100 MG: 100 CAPSULE, LIQUID FILLED ORAL at 10:00

## 2025-08-02 RX ADMIN — FAMOTIDINE 20 MG: 20 TABLET, FILM COATED ORAL at 21:15

## 2025-08-02 RX ADMIN — DOCUSATE SODIUM 100 MG: 100 CAPSULE, LIQUID FILLED ORAL at 21:15

## 2025-08-03 VITALS
DIASTOLIC BLOOD PRESSURE: 86 MMHG | SYSTOLIC BLOOD PRESSURE: 140 MMHG | WEIGHT: 151 LBS | RESPIRATION RATE: 18 BRPM | HEART RATE: 108 BPM | TEMPERATURE: 98.1 F | BODY MASS INDEX: 25.78 KG/M2 | HEIGHT: 64 IN

## 2025-08-03 RX ORDER — IBUPROFEN 600 MG/1
600 TABLET, FILM COATED ORAL EVERY 6 HOURS PRN
Qty: 30 TABLET | Refills: 0 | Status: SHIPPED | OUTPATIENT
Start: 2025-08-03

## 2025-08-03 RX ORDER — NIFEDIPINE 30 MG
30 TABLET, EXTENDED RELEASE ORAL
Qty: 30 TABLET | Refills: 0 | Status: SHIPPED | OUTPATIENT
Start: 2025-08-04 | End: 2025-08-07

## 2025-08-03 RX ADMIN — IBUPROFEN 600 MG: 600 TABLET ORAL at 01:03

## 2025-08-03 RX ADMIN — DOCUSATE SODIUM 100 MG: 100 CAPSULE, LIQUID FILLED ORAL at 08:32

## 2025-08-03 RX ADMIN — NIFEDIPINE 30 MG: 30 TABLET, EXTENDED RELEASE ORAL at 08:31

## 2025-08-03 RX ADMIN — FAMOTIDINE 20 MG: 20 TABLET, FILM COATED ORAL at 08:31

## 2025-08-03 RX ADMIN — FERROUS SULFATE TAB 325 MG (65 MG ELEMENTAL FE) 325 MG: 325 (65 FE) TAB at 08:31

## 2025-08-04 ENCOUNTER — MATERNAL SCREENING (OUTPATIENT)
Dept: CALL CENTER | Facility: HOSPITAL | Age: 40
End: 2025-08-04
Payer: MEDICAID

## 2025-08-05 ENCOUNTER — TELEPHONE (OUTPATIENT)
Dept: OBSTETRICS AND GYNECOLOGY | Facility: CLINIC | Age: 40
End: 2025-08-05
Payer: MEDICAID

## 2025-08-05 ENCOUNTER — HOSPITAL ENCOUNTER (OUTPATIENT)
Facility: HOSPITAL | Age: 40
Setting detail: OBSERVATION
Discharge: HOME | End: 2025-08-07
Attending: OBSTETRICS & GYNECOLOGY | Admitting: OBSTETRICS & GYNECOLOGY
Payer: MEDICAID

## 2025-08-05 LAB
ALP SERPL-CCNC: 133 U/L (ref 39–117)
ALT SERPL W P-5'-P-CCNC: 52 U/L (ref 1–33)
AST SERPL-CCNC: 45 U/L (ref 1–32)
BILIRUB SERPL-MCNC: 0.2 MG/DL (ref 0–1.2)
CREAT SERPL-MCNC: 0.64 MG/DL (ref 0.57–1)
DEPRECATED RDW RBC AUTO: 42.6 FL (ref 37–54)
ERYTHROCYTE [DISTWIDTH] IN BLOOD BY AUTOMATED COUNT: 13.4 % (ref 12.3–15.4)
EXPIRATION DATE: NORMAL
HCT VFR BLD AUTO: 31.5 % (ref 34–46.6)
HGB BLD-MCNC: 10 G/DL (ref 12–15.9)
LDH SERPL-CCNC: 294 U/L (ref 135–214)
Lab: NORMAL
MCH RBC QN AUTO: 27.9 PG (ref 26.6–33)
MCHC RBC AUTO-ENTMCNC: 31.7 G/DL (ref 31.5–35.7)
MCV RBC AUTO: 88 FL (ref 79–97)
PLATELET # BLD AUTO: 222 10*3/MM3 (ref 140–450)
PMV BLD AUTO: 9 FL (ref 6–12)
PROT UR STRIP-MCNC: NEGATIVE MG/DL
RBC # BLD AUTO: 3.58 10*6/MM3 (ref 3.77–5.28)
URATE SERPL-MCNC: 5.6 MG/DL (ref 2.4–5.7)
WBC NRBC COR # BLD AUTO: 7.91 10*3/MM3 (ref 3.4–10.8)

## 2025-08-05 PROCEDURE — 82247 BILIRUBIN TOTAL: CPT | Performed by: OBSTETRICS & GYNECOLOGY

## 2025-08-05 PROCEDURE — 99285 EMERGENCY DEPT VISIT HI MDM: CPT | Performed by: OBSTETRICS & GYNECOLOGY

## 2025-08-05 PROCEDURE — 83615 LACTATE (LD) (LDH) ENZYME: CPT | Performed by: OBSTETRICS & GYNECOLOGY

## 2025-08-05 PROCEDURE — 99222 1ST HOSP IP/OBS MODERATE 55: CPT | Performed by: OBSTETRICS & GYNECOLOGY

## 2025-08-05 PROCEDURE — 84075 ASSAY ALKALINE PHOSPHATASE: CPT | Performed by: OBSTETRICS & GYNECOLOGY

## 2025-08-05 PROCEDURE — 84460 ALANINE AMINO (ALT) (SGPT): CPT | Performed by: OBSTETRICS & GYNECOLOGY

## 2025-08-05 PROCEDURE — 81002 URINALYSIS NONAUTO W/O SCOPE: CPT | Performed by: OBSTETRICS & GYNECOLOGY

## 2025-08-05 PROCEDURE — G0378 HOSPITAL OBSERVATION PER HR: HCPCS

## 2025-08-05 PROCEDURE — 84550 ASSAY OF BLOOD/URIC ACID: CPT | Performed by: OBSTETRICS & GYNECOLOGY

## 2025-08-05 PROCEDURE — 82565 ASSAY OF CREATININE: CPT | Performed by: OBSTETRICS & GYNECOLOGY

## 2025-08-05 PROCEDURE — 96361 HYDRATE IV INFUSION ADD-ON: CPT

## 2025-08-05 PROCEDURE — 85027 COMPLETE CBC AUTOMATED: CPT | Performed by: OBSTETRICS & GYNECOLOGY

## 2025-08-05 PROCEDURE — 84450 TRANSFERASE (AST) (SGOT): CPT | Performed by: OBSTETRICS & GYNECOLOGY

## 2025-08-05 RX ORDER — DEXTROSE AND SODIUM CHLORIDE 5; .2 G/100ML; G/100ML
75 INJECTION, SOLUTION INTRAVENOUS CONTINUOUS
Status: DISCONTINUED | OUTPATIENT
Start: 2025-08-05 | End: 2025-08-07 | Stop reason: HOSPADM

## 2025-08-05 RX ORDER — LABETALOL 200 MG/1
200 TABLET, FILM COATED ORAL EVERY 12 HOURS SCHEDULED
Status: DISCONTINUED | OUTPATIENT
Start: 2025-08-05 | End: 2025-08-05

## 2025-08-05 RX ORDER — CALCIUM GLUCONATE 98 MG/ML
1 INJECTION, SOLUTION INTRAVENOUS AS NEEDED
Status: DISCONTINUED | OUTPATIENT
Start: 2025-08-05 | End: 2025-08-07 | Stop reason: HOSPADM

## 2025-08-05 RX ORDER — LABETALOL 100 MG/1
100 TABLET, FILM COATED ORAL EVERY 8 HOURS SCHEDULED
Status: DISCONTINUED | OUTPATIENT
Start: 2025-08-05 | End: 2025-08-07 | Stop reason: HOSPADM

## 2025-08-05 RX ORDER — LABETALOL 100 MG/1
100 TABLET, FILM COATED ORAL EVERY 8 HOURS SCHEDULED
Status: DISCONTINUED | OUTPATIENT
Start: 2025-08-05 | End: 2025-08-05

## 2025-08-05 RX ORDER — BUTALBITAL, ACETAMINOPHEN AND CAFFEINE 50; 325; 40 MG/1; MG/1; MG/1
2 TABLET ORAL ONCE
Status: COMPLETED | OUTPATIENT
Start: 2025-08-05 | End: 2025-08-05

## 2025-08-05 RX ORDER — ACETAMINOPHEN 325 MG/1
650 TABLET ORAL EVERY 6 HOURS PRN
Status: DISCONTINUED | OUTPATIENT
Start: 2025-08-05 | End: 2025-08-07 | Stop reason: HOSPADM

## 2025-08-05 RX ORDER — IBUPROFEN 600 MG/1
600 TABLET, FILM COATED ORAL EVERY 6 HOURS SCHEDULED
Status: DISCONTINUED | OUTPATIENT
Start: 2025-08-05 | End: 2025-08-07 | Stop reason: HOSPADM

## 2025-08-05 RX ORDER — MAGNESIUM SULFATE HEPTAHYDRATE 40 MG/ML
2 INJECTION, SOLUTION INTRAVENOUS CONTINUOUS
Status: DISPENSED | OUTPATIENT
Start: 2025-08-05 | End: 2025-08-06

## 2025-08-05 RX ADMIN — BUTALBITAL, ACETAMINOPHEN, AND CAFFEINE 2 TABLET: 325; 50; 40 TABLET ORAL at 22:00

## 2025-08-05 RX ADMIN — IBUPROFEN 600 MG: 600 TABLET, FILM COATED ORAL at 20:00

## 2025-08-05 RX ADMIN — BUTALBITAL, ACETAMINOPHEN, AND CAFFEINE 2 TABLET: 325; 50; 40 TABLET ORAL at 15:49

## 2025-08-05 RX ADMIN — DEXTROSE AND SODIUM CHLORIDE 75 ML/HR: 5; 200 INJECTION, SOLUTION INTRAVENOUS at 17:00

## 2025-08-05 RX ADMIN — LABETALOL HYDROCHLORIDE 100 MG: 100 TABLET, FILM COATED ORAL at 21:39

## 2025-08-06 LAB
ALP SERPL-CCNC: 108 U/L (ref 39–117)
ALT SERPL W P-5'-P-CCNC: 51 U/L (ref 1–33)
AST SERPL-CCNC: 39 U/L (ref 1–32)
BILIRUB SERPL-MCNC: <0.2 MG/DL (ref 0–1.2)
CREAT SERPL-MCNC: 0.63 MG/DL (ref 0.57–1)
DEPRECATED RDW RBC AUTO: 43.1 FL (ref 37–54)
ERYTHROCYTE [DISTWIDTH] IN BLOOD BY AUTOMATED COUNT: 13.5 % (ref 12.3–15.4)
HCT VFR BLD AUTO: 29.7 % (ref 34–46.6)
HGB BLD-MCNC: 9.4 G/DL (ref 12–15.9)
LDH SERPL-CCNC: 256 U/L (ref 135–214)
MAGNESIUM SERPL-MCNC: 6.2 MG/DL (ref 1.6–2.6)
MCH RBC QN AUTO: 27.8 PG (ref 26.6–33)
MCHC RBC AUTO-ENTMCNC: 31.6 G/DL (ref 31.5–35.7)
MCV RBC AUTO: 87.9 FL (ref 79–97)
PLATELET # BLD AUTO: 206 10*3/MM3 (ref 140–450)
PMV BLD AUTO: 8.8 FL (ref 6–12)
RBC # BLD AUTO: 3.38 10*6/MM3 (ref 3.77–5.28)
URATE SERPL-MCNC: 6 MG/DL (ref 2.4–5.7)
WBC NRBC COR # BLD AUTO: 6.09 10*3/MM3 (ref 3.4–10.8)

## 2025-08-06 PROCEDURE — 84550 ASSAY OF BLOOD/URIC ACID: CPT | Performed by: OBSTETRICS & GYNECOLOGY

## 2025-08-06 PROCEDURE — 83735 ASSAY OF MAGNESIUM: CPT | Performed by: OBSTETRICS & GYNECOLOGY

## 2025-08-06 PROCEDURE — 99232 SBSQ HOSP IP/OBS MODERATE 35: CPT | Performed by: OBSTETRICS & GYNECOLOGY

## 2025-08-06 PROCEDURE — 96366 THER/PROPH/DIAG IV INF ADDON: CPT

## 2025-08-06 PROCEDURE — 82247 BILIRUBIN TOTAL: CPT | Performed by: OBSTETRICS & GYNECOLOGY

## 2025-08-06 PROCEDURE — 84075 ASSAY ALKALINE PHOSPHATASE: CPT | Performed by: OBSTETRICS & GYNECOLOGY

## 2025-08-06 PROCEDURE — 82565 ASSAY OF CREATININE: CPT | Performed by: OBSTETRICS & GYNECOLOGY

## 2025-08-06 PROCEDURE — 25010000002 MAGNESIUM SULFATE 20 GM/500ML SOLUTION: Performed by: OBSTETRICS & GYNECOLOGY

## 2025-08-06 PROCEDURE — 96365 THER/PROPH/DIAG IV INF INIT: CPT

## 2025-08-06 PROCEDURE — 84450 TRANSFERASE (AST) (SGOT): CPT | Performed by: OBSTETRICS & GYNECOLOGY

## 2025-08-06 PROCEDURE — 85027 COMPLETE CBC AUTOMATED: CPT | Performed by: OBSTETRICS & GYNECOLOGY

## 2025-08-06 PROCEDURE — 25010000002 ONDANSETRON PER 1 MG: Performed by: OBSTETRICS & GYNECOLOGY

## 2025-08-06 PROCEDURE — G0378 HOSPITAL OBSERVATION PER HR: HCPCS

## 2025-08-06 PROCEDURE — 96375 TX/PRO/DX INJ NEW DRUG ADDON: CPT

## 2025-08-06 PROCEDURE — 84460 ALANINE AMINO (ALT) (SGPT): CPT | Performed by: OBSTETRICS & GYNECOLOGY

## 2025-08-06 PROCEDURE — 83615 LACTATE (LD) (LDH) ENZYME: CPT | Performed by: OBSTETRICS & GYNECOLOGY

## 2025-08-06 RX ORDER — BUTALBITAL, ACETAMINOPHEN AND CAFFEINE 50; 325; 40 MG/1; MG/1; MG/1
2 TABLET ORAL ONCE
Status: COMPLETED | OUTPATIENT
Start: 2025-08-06 | End: 2025-08-06

## 2025-08-06 RX ORDER — ONDANSETRON 2 MG/ML
4 INJECTION INTRAMUSCULAR; INTRAVENOUS EVERY 6 HOURS PRN
Status: DISCONTINUED | OUTPATIENT
Start: 2025-08-06 | End: 2025-08-07 | Stop reason: HOSPADM

## 2025-08-06 RX ORDER — LABETALOL 100 MG/1
100 TABLET, FILM COATED ORAL EVERY 8 HOURS SCHEDULED
Qty: 90 TABLET | Refills: 0 | Status: SHIPPED | OUTPATIENT
Start: 2025-08-06 | End: 2025-08-07

## 2025-08-06 RX ADMIN — MAGNESIUM SULFATE HEPTAHYDRATE 2 G/HR: 40 INJECTION, SOLUTION INTRAVENOUS at 00:36

## 2025-08-06 RX ADMIN — LABETALOL HYDROCHLORIDE 100 MG: 100 TABLET, FILM COATED ORAL at 14:22

## 2025-08-06 RX ADMIN — BUTALBITAL, ACETAMINOPHEN, AND CAFFEINE 2 TABLET: 325; 50; 40 TABLET ORAL at 10:28

## 2025-08-06 RX ADMIN — DEXTROSE AND SODIUM CHLORIDE 75 ML/HR: 5; 200 INJECTION, SOLUTION INTRAVENOUS at 01:30

## 2025-08-06 RX ADMIN — ONDANSETRON 4 MG: 2 INJECTION INTRAMUSCULAR; INTRAVENOUS at 08:53

## 2025-08-06 RX ADMIN — MAGNESIUM SULFATE HEPTAHYDRATE 1 G/HR: 40 INJECTION, SOLUTION INTRAVENOUS at 15:33

## 2025-08-06 RX ADMIN — LABETALOL HYDROCHLORIDE 100 MG: 100 TABLET, FILM COATED ORAL at 06:20

## 2025-08-06 RX ADMIN — LABETALOL HYDROCHLORIDE 100 MG: 100 TABLET, FILM COATED ORAL at 22:08

## 2025-08-07 VITALS
RESPIRATION RATE: 16 BRPM | DIASTOLIC BLOOD PRESSURE: 82 MMHG | HEART RATE: 81 BPM | OXYGEN SATURATION: 98 % | SYSTOLIC BLOOD PRESSURE: 145 MMHG | TEMPERATURE: 98.5 F

## 2025-08-07 LAB
ALP SERPL-CCNC: 108 U/L (ref 39–117)
ALT SERPL W P-5'-P-CCNC: 34 U/L (ref 1–33)
AST SERPL-CCNC: 24 U/L (ref 1–32)
BILIRUB SERPL-MCNC: <0.2 MG/DL (ref 0–1.2)
CREAT SERPL-MCNC: 0.66 MG/DL (ref 0.57–1)
DEPRECATED RDW RBC AUTO: 44.2 FL (ref 37–54)
ERYTHROCYTE [DISTWIDTH] IN BLOOD BY AUTOMATED COUNT: 13.6 % (ref 12.3–15.4)
HCT VFR BLD AUTO: 30.2 % (ref 34–46.6)
HGB BLD-MCNC: 9.3 G/DL (ref 12–15.9)
LDH SERPL-CCNC: 240 U/L (ref 135–214)
MCH RBC QN AUTO: 27.7 PG (ref 26.6–33)
MCHC RBC AUTO-ENTMCNC: 30.8 G/DL (ref 31.5–35.7)
MCV RBC AUTO: 89.9 FL (ref 79–97)
PLATELET # BLD AUTO: 209 10*3/MM3 (ref 140–450)
PMV BLD AUTO: 8.9 FL (ref 6–12)
RBC # BLD AUTO: 3.36 10*6/MM3 (ref 3.77–5.28)
URATE SERPL-MCNC: 6.4 MG/DL (ref 2.4–5.7)
WBC NRBC COR # BLD AUTO: 5.98 10*3/MM3 (ref 3.4–10.8)

## 2025-08-07 PROCEDURE — 84450 TRANSFERASE (AST) (SGOT): CPT | Performed by: OBSTETRICS & GYNECOLOGY

## 2025-08-07 PROCEDURE — 83615 LACTATE (LD) (LDH) ENZYME: CPT | Performed by: OBSTETRICS & GYNECOLOGY

## 2025-08-07 PROCEDURE — 99238 HOSP IP/OBS DSCHRG MGMT 30/<: CPT | Performed by: OBSTETRICS & GYNECOLOGY

## 2025-08-07 PROCEDURE — 85027 COMPLETE CBC AUTOMATED: CPT | Performed by: OBSTETRICS & GYNECOLOGY

## 2025-08-07 PROCEDURE — 84075 ASSAY ALKALINE PHOSPHATASE: CPT | Performed by: OBSTETRICS & GYNECOLOGY

## 2025-08-07 PROCEDURE — 82565 ASSAY OF CREATININE: CPT | Performed by: OBSTETRICS & GYNECOLOGY

## 2025-08-07 PROCEDURE — G0378 HOSPITAL OBSERVATION PER HR: HCPCS

## 2025-08-07 PROCEDURE — 82247 BILIRUBIN TOTAL: CPT | Performed by: OBSTETRICS & GYNECOLOGY

## 2025-08-07 PROCEDURE — 84460 ALANINE AMINO (ALT) (SGPT): CPT | Performed by: OBSTETRICS & GYNECOLOGY

## 2025-08-07 PROCEDURE — 84550 ASSAY OF BLOOD/URIC ACID: CPT | Performed by: OBSTETRICS & GYNECOLOGY

## 2025-08-07 RX ORDER — DOCUSATE SODIUM 100 MG/1
100 CAPSULE, LIQUID FILLED ORAL 2 TIMES DAILY PRN
Qty: 60 CAPSULE | Refills: 1 | Status: SHIPPED | OUTPATIENT
Start: 2025-08-07

## 2025-08-07 RX ORDER — LABETALOL 200 MG/1
200 TABLET, FILM COATED ORAL EVERY 8 HOURS SCHEDULED
Qty: 90 TABLET | Refills: 0 | Status: SHIPPED | OUTPATIENT
Start: 2025-08-07 | End: 2025-09-06

## 2025-08-07 RX ADMIN — LABETALOL HYDROCHLORIDE 100 MG: 100 TABLET, FILM COATED ORAL at 06:02

## 2025-08-11 ENCOUNTER — POSTPARTUM VISIT (OUTPATIENT)
Dept: OBSTETRICS AND GYNECOLOGY | Facility: CLINIC | Age: 40
End: 2025-08-11
Payer: MEDICAID

## 2025-08-11 VITALS — BODY MASS INDEX: 23.34 KG/M2 | WEIGHT: 136 LBS | SYSTOLIC BLOOD PRESSURE: 132 MMHG | DIASTOLIC BLOOD PRESSURE: 84 MMHG

## 2025-08-11 DIAGNOSIS — M79.604 RIGHT LEG PAIN: ICD-10-CM

## 2025-08-11 DIAGNOSIS — I83.891 VARICOSE VEINS OF RIGHT LOWER EXTREMITY WITH OTHER COMPLICATIONS: ICD-10-CM

## 2025-08-12 ENCOUNTER — HOSPITAL ENCOUNTER (OUTPATIENT)
Dept: CARDIOLOGY | Facility: HOSPITAL | Age: 40
Discharge: HOME | End: 2025-08-12
Admitting: OBSTETRICS & GYNECOLOGY
Payer: MEDICAID

## 2025-08-12 ENCOUNTER — MATERNAL SCREENING (OUTPATIENT)
Dept: CALL CENTER | Facility: HOSPITAL | Age: 40
End: 2025-08-12
Payer: MEDICAID

## 2025-08-12 DIAGNOSIS — I83.891 VARICOSE VEINS OF RIGHT LOWER EXTREMITY WITH OTHER COMPLICATIONS: ICD-10-CM

## 2025-08-12 DIAGNOSIS — M79.604 RIGHT LEG PAIN: ICD-10-CM

## 2025-08-12 LAB
BH CV LOW VAS RIGHT VARICOSITY BK VESSEL: 1
BH CV LOWER VASCULAR RIGHT COMMON FEMORAL AUGMENT: NORMAL
BH CV LOWER VASCULAR RIGHT COMMON FEMORAL COMPETENT: NORMAL
BH CV LOWER VASCULAR RIGHT COMMON FEMORAL COMPRESS: NORMAL
BH CV LOWER VASCULAR RIGHT COMMON FEMORAL PHASIC: NORMAL
BH CV LOWER VASCULAR RIGHT COMMON FEMORAL SPONT: NORMAL
BH CV LOWER VASCULAR RIGHT DISTAL FEMORAL COMPRESS: NORMAL
BH CV LOWER VASCULAR RIGHT GASTRONEMIUS COMPRESS: NORMAL
BH CV LOWER VASCULAR RIGHT GREATER SAPH AK COMPRESS: NORMAL
BH CV LOWER VASCULAR RIGHT GREATER SAPH BK COMPRESS: NORMAL
BH CV LOWER VASCULAR RIGHT LESSER SAPH COMPRESS: NORMAL
BH CV LOWER VASCULAR RIGHT MID FEMORAL AUGMENT: NORMAL
BH CV LOWER VASCULAR RIGHT MID FEMORAL COMPETENT: NORMAL
BH CV LOWER VASCULAR RIGHT MID FEMORAL COMPRESS: NORMAL
BH CV LOWER VASCULAR RIGHT MID FEMORAL PHASIC: NORMAL
BH CV LOWER VASCULAR RIGHT MID FEMORAL SPONT: NORMAL
BH CV LOWER VASCULAR RIGHT PERONEAL COMPRESS: NORMAL
BH CV LOWER VASCULAR RIGHT POPLITEAL AUGMENT: NORMAL
BH CV LOWER VASCULAR RIGHT POPLITEAL COMPETENT: NORMAL
BH CV LOWER VASCULAR RIGHT POPLITEAL COMPRESS: NORMAL
BH CV LOWER VASCULAR RIGHT POPLITEAL PHASIC: NORMAL
BH CV LOWER VASCULAR RIGHT POPLITEAL SPONT: NORMAL
BH CV LOWER VASCULAR RIGHT POSTERIOR TIBIAL COMPRESS: NORMAL
BH CV LOWER VASCULAR RIGHT PROFUNDA FEMORAL COMPRESS: NORMAL
BH CV LOWER VASCULAR RIGHT PROXIMAL FEMORAL COMPRESS: NORMAL
BH CV LOWER VASCULAR RIGHT SAPHENOFEMORAL JUNCTION COMPRESS: NORMAL
BH CV LOWER VASCULAR RIGHT VARICOSITY BK COMPRESS: NORMAL
BH CV LOWER VASCULAR RIGHT VARICOSITY BK THROMBUS: NORMAL
BH CV VAS PRELIMINARY FINDINGS SCRIPTING: 1

## 2025-08-12 PROCEDURE — 93971 EXTREMITY STUDY: CPT | Performed by: INTERNAL MEDICINE

## 2025-08-12 PROCEDURE — 93971 EXTREMITY STUDY: CPT
